# Patient Record
Sex: FEMALE | Race: BLACK OR AFRICAN AMERICAN | Employment: FULL TIME | URBAN - METROPOLITAN AREA
[De-identification: names, ages, dates, MRNs, and addresses within clinical notes are randomized per-mention and may not be internally consistent; named-entity substitution may affect disease eponyms.]

---

## 2017-02-07 RX ORDER — PIOGLITAZONEHYDROCHLORIDE 30 MG/1
TABLET ORAL
Qty: 90 TAB | Refills: 0 | Status: SHIPPED | OUTPATIENT
Start: 2017-02-07 | End: 2017-09-16 | Stop reason: SDUPTHER

## 2017-03-07 ENCOUNTER — TELEPHONE (OUTPATIENT)
Dept: FAMILY MEDICINE CLINIC | Age: 57
End: 2017-03-07

## 2017-03-07 RX ORDER — IRBESARTAN 75 MG/1
TABLET ORAL
Qty: 90 TAB | Refills: 0 | Status: SHIPPED | OUTPATIENT
Start: 2017-03-07 | End: 2017-09-20 | Stop reason: SDUPTHER

## 2017-03-07 RX ORDER — LIRAGLUTIDE 6 MG/ML
INJECTION SUBCUTANEOUS
Qty: 18 SYRINGE | Refills: 4 | Status: SHIPPED | OUTPATIENT
Start: 2017-03-07 | End: 2017-03-22 | Stop reason: SDUPTHER

## 2017-03-07 NOTE — TELEPHONE ENCOUNTER
[phone] notified patient labs been ordered she can come in prior to her appointment 3/22/2017 anytime for rlabs only

## 2017-03-07 NOTE — TELEPHONE ENCOUNTER
Contact # is [phone]    Patient had to reschedule 8am appointment on March 22nd due to provider and is requesting that the fasting blood work that Dr Marium Ervin would like done be placed in so that she can come in that Monday, March 20th, to do the blood work.

## 2017-03-15 ENCOUNTER — LAB ONLY (OUTPATIENT)
Dept: FAMILY MEDICINE CLINIC | Age: 57
End: 2017-03-15

## 2017-03-15 DIAGNOSIS — E78.5 HYPERLIPIDEMIA WITH TARGET LDL LESS THAN 100: ICD-10-CM

## 2017-03-15 DIAGNOSIS — I10 ESSENTIAL HYPERTENSION WITH GOAL BLOOD PRESSURE LESS THAN 140/90: ICD-10-CM

## 2017-03-15 DIAGNOSIS — E11.9 DIABETES MELLITUS TYPE 2, CONTROLLED (HCC): ICD-10-CM

## 2017-03-16 LAB
ALBUMIN SERPL-MCNC: 4.2 G/DL (ref 3.5–5.5)
ALBUMIN/CREAT UR: 3.6 MG/G CREAT (ref 0–30)
ALBUMIN/GLOB SERPL: 1.6 {RATIO} (ref 1.2–2.2)
ALP SERPL-CCNC: 78 IU/L (ref 39–117)
ALT SERPL-CCNC: 11 IU/L (ref 0–32)
AST SERPL-CCNC: 13 IU/L (ref 0–40)
BILIRUB SERPL-MCNC: 0.4 MG/DL (ref 0–1.2)
BUN SERPL-MCNC: 18 MG/DL (ref 6–24)
BUN/CREAT SERPL: 24 (ref 9–23)
CALCIUM SERPL-MCNC: 9.2 MG/DL (ref 8.7–10.2)
CHLORIDE SERPL-SCNC: 106 MMOL/L (ref 96–106)
CHOLEST SERPL-MCNC: 142 MG/DL (ref 100–199)
CO2 SERPL-SCNC: 25 MMOL/L (ref 18–29)
CREAT SERPL-MCNC: 0.75 MG/DL (ref 0.57–1)
CREAT UR-MCNC: 120.7 MG/DL
GLOBULIN SER CALC-MCNC: 2.6 G/DL (ref 1.5–4.5)
GLUCOSE SERPL-MCNC: 106 MG/DL (ref 65–99)
HDLC SERPL-MCNC: 70 MG/DL
INTERPRETATION, 910389: NORMAL
LDLC SERPL CALC-MCNC: 65 MG/DL (ref 0–99)
MICROALBUMIN UR-MCNC: 4.3 UG/ML
POTASSIUM SERPL-SCNC: 4.6 MMOL/L (ref 3.5–5.2)
PROT SERPL-MCNC: 6.8 G/DL (ref 6–8.5)
SODIUM SERPL-SCNC: 145 MMOL/L (ref 134–144)
TRIGL SERPL-MCNC: 34 MG/DL (ref 0–149)
VLDLC SERPL CALC-MCNC: 7 MG/DL (ref 5–40)

## 2017-03-22 ENCOUNTER — OFFICE VISIT (OUTPATIENT)
Dept: FAMILY MEDICINE CLINIC | Age: 57
End: 2017-03-22

## 2017-03-22 VITALS
RESPIRATION RATE: 15 BRPM | OXYGEN SATURATION: 99 % | DIASTOLIC BLOOD PRESSURE: 83 MMHG | HEIGHT: 63 IN | BODY MASS INDEX: 37.46 KG/M2 | WEIGHT: 211.4 LBS | HEART RATE: 77 BPM | SYSTOLIC BLOOD PRESSURE: 138 MMHG | TEMPERATURE: 98.4 F

## 2017-03-22 DIAGNOSIS — E78.5 HYPERLIPIDEMIA WITH TARGET LDL LESS THAN 100: ICD-10-CM

## 2017-03-22 DIAGNOSIS — I10 ESSENTIAL HYPERTENSION: ICD-10-CM

## 2017-03-22 DIAGNOSIS — Z23 ENCOUNTER FOR IMMUNIZATION: ICD-10-CM

## 2017-03-22 DIAGNOSIS — E11.9 CONTROLLED TYPE 2 DIABETES MELLITUS WITHOUT COMPLICATION, WITHOUT LONG-TERM CURRENT USE OF INSULIN (HCC): Primary | ICD-10-CM

## 2017-03-22 LAB — HBA1C MFR BLD HPLC: 6.1 %

## 2017-03-22 NOTE — PROGRESS NOTES
HISTORY OF PRESENT ILLNESS  Domenico Nelson is a 64 y.o. female. Blood pressure 138/83, pulse 77, temperature 98.4 °F (36.9 °C), temperature source Oral, resp. rate 15, height 5' 3\" (1.6 m), weight 211 lb 6.4 oz (95.9 kg), last menstrual period 06/30/2013, SpO2 99 %. Body mass index is 37.45 kg/(m^2). Chief Complaint   Patient presents with    Diabetes    Hypertension      HPI   Domenico Nelson 64 y.o. female  presents to the office today for a follow up on chronic conditions. Hypertension: Bp at office today 138/83. Pt continues with Irbesartan 75 mg nightly. Bp control stable, continue current regimen. DM2: A1c per POC today 6.1%, increased from A1c 5.7% per POC on 09/21/16. Pt continues with Victoza 1.2 mg daily, Actos 30 mg daily, and Metformin  mg daily. Pt notes she discontinued Farxiga 5 mg daily one week ago due to associated muscle aches in her low back. She states she had tried to go back to taking [place], but states the low back pain returned so she discontinued it. Diabetes is controlled with A1c 6.1% per POC today. Since pt has discontinued her Farxiga 5 mg due to associated low back pain, I have advised pt to increase dosage of Victoza to 1.8 mg daily and to decrease dosage of Actos to 15 mg daily. Hyperlipidemia: Lipid panel on 03/15/17 notable for total cholesterol 142, LDL 65, HDL 70, and triglycerides 34. Pt continues with Lipitor 20 mg daily. Cholesterol is stable, continue current regimen. Health maintenance: Pt will receive Pneumovax-23 at office today. Pt notes last pap smear was in June 2016 with Dr. Goldie Restrepo (gynecology). Current Outpatient Prescriptions   Medication Sig Dispense Refill    Liraglutide (VICTOZA 2-MAYLIN) 0.6 mg/0.1 mL (18 mg/3 mL) sub-q pen 0.3 mL by SubCUTAneous route daily. 3 Syringe 5    irbesartan (AVAPRO) 75 mg tablet TAKE 1 TABLET BY MOUTH NIGHTLY. 90 Tab 0    pioglitazone (ACTOS) 30 mg tablet TAKE 1 TABLET BY MOUTH DAILY.  90 Tab 0    ONETOUCH ULTRA TEST strip USE DAILY AS DIRECTED 100 Strip 11    metFORMIN ER (GLUCOPHAGE XR) 750 mg tablet TAKE 1 TAB BY MOUTH DAILY. 30 Tab 5    NOVOFINE 32 32 gauge x 1/4\" ndle USE AS DIRECTED 50 Pen Needle 5    atorvastatin (LIPITOR) 20 mg tablet TAKE ONE TABLET BY MOUTH DAILY 30 Tab 5    diclofenac (VOLTAREN) 1 % gel Apply 4 g to affected area four (4) times daily. 100 g 0     Allergies   Allergen Reactions    Pcn [Penicillins] Rash     Past Medical History:   Diagnosis Date    Diabetes ([facility]     Hypercholesterolemia     Hypertension      No past surgical history on file. Family History   Problem Relation Age of Onset    Diabetes Mother     Diabetes Father     Diabetes Maternal Aunt     Diabetes Maternal Grandmother     Diabetes Maternal Grandfather     Diabetes Paternal Grandmother     Diabetes Paternal Grandfather      Social History   Substance Use Topics    Smoking status: Never Smoker    Smokeless tobacco: Never Used    Alcohol use No        Review of Systems   Constitutional: Negative. Negative for malaise/fatigue. Eyes: Negative for blurred vision. Respiratory: Negative for shortness of breath. Cardiovascular: Negative for chest pain and leg swelling. Musculoskeletal: Negative. Neurological: Negative. Negative for dizziness and headaches. All other systems reviewed and are negative. Physical Exam   Constitutional: She is oriented to person, place, and time. She appears well-developed and well-nourished. No distress. HENT:   Head: Normocephalic and atraumatic. Neck: Carotid bruit is not present. Cardiovascular: Normal rate, regular rhythm, normal heart sounds and intact distal pulses. Exam reveals no gallop and no friction rub. No murmur heard. Pulmonary/Chest: Effort normal and breath sounds normal. No respiratory distress. She has no wheezes. She has no rales. Musculoskeletal: She exhibits no edema.    Neurological: She is alert and oriented to person, place, and time. Skin: She is not diaphoretic. Psychiatric: She has a normal mood and affect. Her behavior is normal. Judgment and thought content normal.   Nursing note and vitals reviewed. Diabetic foot exam:     Left: Reflexes 2+     Vibratory sensation normal    Proprioception normal   Sharp/dull discrimination normal    Filament test normal sensation with micro filament   Pulse DP: 2+ (normal)   Pulse PT: 2+ (normal)   Deformities: None  Right: Reflexes 2+   Vibratory sensation normal   Proprioception normal   Sharp/dull discrimination normal   Filament test normal sensation with micro filament   Pulse DP: 2+ (normal)   Pulse PT: 2+ (normal)   Deformities: None    ASSESSMENT and PLAN  Haider Dillard was seen today for diabetes and hypertension. Diagnoses and all orders for this visit:    Controlled type 2 diabetes mellitus without complication, without long-term current use of insulin ([facility])  -     [facility] POC HEMOGLOBIN A1C  -      DIABETES FOOT EXAM  -     Liraglutide (VICTOZA 2-MAYLIN) 0.6 mg/0.1 mL (18 mg/3 mL) sub-q pen; 0.3 mL by SubCUTAneous route daily.  - Diabetes is controlled with A1c 6.1% per POC today. Since pt has discontinued her Farxiga 5 mg due to associated low back pain, I have advised pt to increase dosage of Victoza from 1.2 mg to 1.8 mg daily and decrease dosage of Actos to 15 mg daily. Monitor blood glucose levels outside the office. Hyperlipidemia with target LDL less than 100  Stable, continue current regimen    Essential hypertension  Bp control stable, continue current regimen. Obesity, Class II, BMI 35-39.9, with comorbidity ([address]  I have reviewed/discussed the above normal BMI with the patient. I have recommended the following interventions: dietary management education, guidance, and counseling, dietary needs education, encourage exercise, feeding regime, lifestyle education regarding diet and monitor weight .  The plan is as follows: I have counseled this patient on diet and exercise regimens. .      Encounter for immunization  -     PNEUMOCOCCAL POLYSACCHARIDE VACCINE, 23-VALENT, ADULT OR IMMUNOSUPPRESSED PT DOSE,      Follow-up Disposition:  Return in about 6 months (around 9/22/2017) for diabetes follow up. Medication risks/benefits/costs/interactions/alternatives discussed with patient. Advised patient to call back or return to office if symptoms worsen/change/persist.  If patient cannot reach us or should anything more severe/urgent arise he/she should proceed directly to the nearest emergency department. Discussed expected course/resolution/complications of diagnosis in detail with patient. Patient given a written after visit summary which includes her diagnoses, current medications and vitals. Patient expressed understanding with the diagnosis and plan. Written by valencia Cordero, as dictated by Shiraz Pappas M.D.    I have reviewed and agree with the above note and have made corrections where appropriate, Dr. Cory Blanchard MD

## 2017-03-22 NOTE — PROGRESS NOTES
Chief Complaint   Patient presents with    Diabetes    Hypertension       1. Have you been to the ER, urgent care clinic since your last visit? Hospitalized since your last visit? No    2. Have you seen or consulted any other health care providers outside of the [address] [place] [address] since your last visit? Include any pap smears or colon screening. No    Body mass index is 37.45 kg/(m^2).

## 2017-03-22 NOTE — MR AVS SNAPSHOT
Visit Information Date & Time Provider Department Dept. Phone Encounter #  
 3/22/2017 10:30 AM Rosita Lara  [facility] [address] [phone] [id number] Follow-up Instructions Return in about 6 months (around 9/22/2017) for diabetes follow up. Upcoming Health Maintenance Date Due Pneumococcal 19-64 Medium Risk (1 of 1 - PPSV23) 6/8/1979 PAP AKA CERVICAL CYTOLOGY 5/1/2016 FOOT EXAM Q1 11/11/2016 HEMOGLOBIN A1C Q6M 3/21/2017 EYE EXAM RETINAL OR DILATED Q1 6/27/2017 MICROALBUMIN Q1 3/15/2018 LIPID PANEL Q1 3/15/2018 BREAST CANCER SCRN MAMMOGRAM 11/16/2018 COLONOSCOPY 5/1/2020 DTaP/Tdap/Td series (2 - Td) 12/2/2021 Allergies as of 3/22/2017  Review Complete On: 3/22/2017 By: Sheldon Glass LPN Severity Noted Reaction Type Reactions Pcn [Penicillins]  05/03/2010    Rash Current Immunizations  Reviewed on 3/4/2015 Name Date Influenza Vaccine 10/22/2015 Influenza Vaccine (Quad) PF 9/21/2016  5:19 PM  
 Influenza Vaccine PF 1/19/2015, 10/28/2013 Meningococcal Vaccine 12/2/2011 Pneumococcal Polysaccharide (PPSV-23)  Incomplete TDAP Vaccine 12/2/2011 Not reviewed this visit You Were Diagnosed With   
  
 Codes Comments Controlled type 2 diabetes mellitus without complication, without long-term current use of insulin ([facility]    -  Primary ICD-10-CM: E11.9 ICD-9-CM: 250.00 Hyperlipidemia with target LDL less than 100     ICD-10-CM: E78.5 ICD-9-CM: 272.4 Essential hypertension     ICD-10-CM: I10 
ICD-9-CM: 401.9 Obesity, Class II, BMI 35-39.9, with comorbidity (HCC)     ICD-10-CM: E66.01 
ICD-9-CM: 278.01 Encounter for immunization     ICD-10-CM: O82 ICD-9-CM: V03.89 Vitals BP Pulse Temp Resp Height(growth percentile) Weight(growth percentile)  138/83 (BP 1 Location: Left arm, BP Patient Position: Sitting) 77 98.4 °F (36.9 °C) (Oral) 15 5' 3\" (1.6 m) 211 lb 6.4 oz (95.9 kg) LMP SpO2 BMI OB Status Smoking Status 2013 99% 37.45 kg/m2 Postmenopausal Never Smoker Vitals History BMI and BSA Data Body Mass Index Body Surface Area  
 37.45 kg/m 2 2.06 m 2 Preferred Pharmacy Pharmacy Name Phone [id number] Myriam Baxter, [address] [place] [address] [phone] Your Updated Medication List  
  
   
This list is accurate as of: 3/22/17 11:33 AM.  Always use your most recent med list.  
  
  
  
  
 atorvastatin 20 mg tablet Commonly known as:  LIPITOR  
TAKE ONE TABLET BY MOUTH DAILY  
  
 diclofenac 1 % Gel Commonly known as:  VOLTAREN Apply 4 g to affected area four (4) times daily. irbesartan 75 mg tablet Commonly known as:  AVAPRO TAKE 1 TABLET BY MOUTH NIGHTLY. Liraglutide 0.6 mg/0.1 mL (18 mg/3 mL) sub-q pen Commonly known as:  VICTOZA 2-MAYLIN  
0.3 mL by SubCUTAneous route daily. metFORMIN  mg tablet Commonly known as:  GLUCOPHAGE XR  
TAKE 1 TAB BY MOUTH DAILY. NOVOFINE 32 32 gauge x 1/4\" Ndle Generic drug:  Insulin Needles (Disposable) USE AS DIRECTED  
  
 ONETOUCH ULTRA TEST strip Generic drug:  glucose blood VI test strips USE DAILY AS DIRECTED  
  
 pioglitazone 30 mg tablet Commonly known as:  ACTOS  
TAKE 1 TABLET BY MOUTH DAILY. Prescriptions Sent to Pharmacy Refills Liraglutide (VICTOZA 2-MAYLIN) 0.6 mg/0.1 mL (18 mg/3 mL) sub-q pen 5 Si.3 mL by SubCUTAneous route daily. Class: Normal  
 Pharmacy: [facility]/pharmacy [address], [address] [place] [address] Ph #: [phone] Route: SubCUTAneous We Performed the Following AMB POC HEMOGLOBIN A1C [06262 CPT(R)] HM DIABETES FOOT EXAM [7 Custom] PNEUMOCOCCAL POLYSACCHARIDE VACCINE, 23-VALENT, ADULT OR IMMUNOSUPPRESSED PT DOSE, [03332 CPT(R)] Follow-up Instructions Return in about 6 months (around 9/22/2017) for diabetes follow up. To-Do List   
 08/01/2017 Lab:  HEMOGLOBIN A1C WITH EAG   
  
 08/01/2017 Lab:  LIPID PANEL   
  
 08/01/2017 Lab:  METABOLIC PANEL, COMPREHENSIVE Introducing [facility]! Julia Orellana introduces [employer] patient portal. Now you can access parts of your medical record, email your doctor's office, and request medication refills online. 1. In your internet browser, go to [email or web address] 2. Click on the First Time User? Click Here link in the Sign In box. You will see the New Member Sign Up page. 3. Enter your [employer] Access Code exactly as it appears below. You will not need to use this code after youve completed the sign-up process. If you do not sign up before the expiration date, you must request a new code. · [employer] Access Code: [email or web address] Expires: 6/20/2017 11:23 AM 
 
4. Enter the last four digits of your Social Security Number (xxxx) and Date of Birth (mm/dd/yyyy) as indicated and click Submit. You will be taken to the next sign-up page. 5. Create a [employer] ID. This will be your [employer] login ID and cannot be changed, so think of one that is secure and easy to remember. 6. Create a [employer] password. You can change your password at any time. 7. Enter your Password Reset Question and Answer. This can be used at a later time if you forget your password. 8. Enter your e-mail address. You will receive e-mail notification when new information is available in [address] 9. Click Sign Up. You can now view and download portions of your medical record. 10. Click the Download Summary menu link to download a portable copy of your medical information. If you have questions, please visit the Frequently Asked Questions section of the [employer] website. Remember, [employer] is NOT to be used for urgent needs. For medical emergencies, dial 911. Now available from your iPhone and Android! Please provide this summary of care documentation to your next provider. Your primary care clinician is listed as Anice Reagan. If you have any questions after today's visit, please call [phone].

## 2017-04-17 RX ORDER — IRBESARTAN 75 MG/1
75 TABLET ORAL
Qty: 90 TAB | Refills: 0 | Status: SHIPPED | OUTPATIENT
Start: 2017-04-17 | End: 2017-06-28 | Stop reason: SDUPTHER

## 2017-04-26 DIAGNOSIS — E78.5 HYPERLIPIDEMIA WITH TARGET LDL LESS THAN 100: ICD-10-CM

## 2017-04-27 RX ORDER — ATORVASTATIN CALCIUM 20 MG/1
TABLET, FILM COATED ORAL
Qty: 30 TAB | Refills: 5 | Status: SHIPPED | OUTPATIENT
Start: 2017-04-27 | End: 2017-09-20 | Stop reason: SDUPTHER

## 2017-06-15 RX ORDER — METFORMIN HYDROCHLORIDE 750 MG/1
TABLET, EXTENDED RELEASE ORAL
Qty: 30 TAB | Refills: 5 | Status: SHIPPED | OUTPATIENT
Start: 2017-06-15 | End: 2017-09-20 | Stop reason: SDUPTHER

## 2017-09-16 DIAGNOSIS — E11.9 DIABETES MELLITUS TYPE 2, CONTROLLED (HCC): ICD-10-CM

## 2017-09-18 RX ORDER — PIOGLITAZONEHYDROCHLORIDE 30 MG/1
TABLET ORAL
Qty: 90 TAB | Refills: 0 | Status: SHIPPED | OUTPATIENT
Start: 2017-09-18 | End: 2017-09-20 | Stop reason: SDUPTHER

## 2017-09-18 RX ORDER — PEN NEEDLE, DIABETIC 32 GX 1/4"
NEEDLE, DISPOSABLE MISCELLANEOUS
Qty: 100 PEN NEEDLE | Refills: 5 | Status: SHIPPED | OUTPATIENT
Start: 2017-09-18 | End: 2017-09-20 | Stop reason: SDUPTHER

## 2017-09-20 ENCOUNTER — OFFICE VISIT (OUTPATIENT)
Dept: FAMILY MEDICINE CLINIC | Age: 57
End: 2017-09-20

## 2017-09-20 VITALS
TEMPERATURE: 98.2 F | OXYGEN SATURATION: 100 % | RESPIRATION RATE: 12 BRPM | DIASTOLIC BLOOD PRESSURE: 70 MMHG | HEART RATE: 93 BPM | HEIGHT: 63 IN | WEIGHT: 214.6 LBS | SYSTOLIC BLOOD PRESSURE: 124 MMHG | BODY MASS INDEX: 38.02 KG/M2

## 2017-09-20 DIAGNOSIS — Z23 ENCOUNTER FOR IMMUNIZATION: ICD-10-CM

## 2017-09-20 DIAGNOSIS — E11.9 CONTROLLED TYPE 2 DIABETES MELLITUS WITHOUT COMPLICATION, WITHOUT LONG-TERM CURRENT USE OF INSULIN (HCC): Primary | ICD-10-CM

## 2017-09-20 DIAGNOSIS — E78.5 HYPERLIPIDEMIA WITH TARGET LDL LESS THAN 100: ICD-10-CM

## 2017-09-20 DIAGNOSIS — E55.9 VITAMIN D DEFICIENCY: ICD-10-CM

## 2017-09-20 DIAGNOSIS — G47.9 SLEEP DISTURBANCE: ICD-10-CM

## 2017-09-20 DIAGNOSIS — I10 ESSENTIAL HYPERTENSION: ICD-10-CM

## 2017-09-20 RX ORDER — BLOOD SUGAR DIAGNOSTIC
STRIP MISCELLANEOUS
Qty: 100 PEN NEEDLE | Refills: 5 | Status: SHIPPED | OUTPATIENT
Start: 2017-09-20 | End: 2018-10-16 | Stop reason: RX

## 2017-09-20 RX ORDER — METFORMIN HYDROCHLORIDE 750 MG/1
TABLET, EXTENDED RELEASE ORAL
Qty: 90 TAB | Refills: 1 | Status: SHIPPED | OUTPATIENT
Start: 2017-09-20 | End: 2018-04-11 | Stop reason: SDUPTHER

## 2017-09-20 RX ORDER — IRBESARTAN 75 MG/1
TABLET ORAL
Qty: 90 TAB | Refills: 1 | Status: SHIPPED | OUTPATIENT
Start: 2017-09-20 | End: 2018-03-13 | Stop reason: SDUPTHER

## 2017-09-20 RX ORDER — ATORVASTATIN CALCIUM 20 MG/1
TABLET, FILM COATED ORAL
Qty: 90 TAB | Refills: 1 | Status: SHIPPED | OUTPATIENT
Start: 2017-09-20 | End: 2018-05-14 | Stop reason: SDUPTHER

## 2017-09-20 RX ORDER — PIOGLITAZONEHYDROCHLORIDE 30 MG/1
TABLET ORAL
Qty: 90 TAB | Refills: 1 | Status: SHIPPED | OUTPATIENT
Start: 2017-09-20 | End: 2018-01-11 | Stop reason: SDUPTHER

## 2017-09-20 NOTE — LETTER
9/20/2017 10:17 AM 
 
Ms. Ángel Mejia [address] [facility] To whom it may concern: 
 
 
Patient Booker Arthur 1960 was seen for PAP examination can we have his/her results fax to our office so we can update his/her chart [phone]. If you have any question please don't hesitate to call us back. Sincerely, Mini Robledo MD

## 2017-09-20 NOTE — PROGRESS NOTES
1. Have you been to the ER, urgent care clinic since your last visit? Hospitalized since your last visit? No    2. Have you seen or consulted any other health care providers outside of the [address] [place] [address] since your last visit? Include any pap smears or colon screening.  No     Chief Complaint   Patient presents with    Hypertension     6 month follow up    Diabetes    Cholesterol Problem

## 2017-09-20 NOTE — MR AVS SNAPSHOT
Visit Information Date & Time Provider Department Dept. Phone Encounter #  
 9/20/2017  9:30 AM Jaun Blanco MD [address] [place] [address] [phone] [id number] Follow-up Instructions Return in about 6 months (around 3/20/2018) for diabetes follow up. Upcoming Health Maintenance Date Due  
 PAP AKA CERVICAL CYTOLOGY 5/1/2016 EYE EXAM RETINAL OR DILATED Q1 6/27/2017 INFLUENZA AGE 9 TO ADULT 8/1/2017 HEMOGLOBIN A1C Q6M 9/22/2017 MICROALBUMIN Q1 3/15/2018 LIPID PANEL Q1 3/15/2018 FOOT EXAM Q1 3/22/2018 BREAST CANCER SCRN MAMMOGRAM 11/16/2018 COLONOSCOPY 5/1/2020 DTaP/Tdap/Td series (2 - Td) 12/2/2021 Allergies as of 9/20/2017  Review Complete On: 9/20/2017 By: Jaun Blanco MD  
  
 Severity Noted Reaction Type Reactions Pcn [Penicillins]  05/03/2010    Rash Current Immunizations  Reviewed on 9/20/2017 Name Date Hep A Vaccine (Adult) 8/18/2017 12:00 AM  
 Hep B Vaccine 8/17/2017 Influenza Vaccine 10/22/2015 Influenza Vaccine (Quad) PF 9/20/2017, 9/21/2016  5:19 PM  
 Influenza Vaccine PF 1/19/2015, 10/28/2013 Meningococcal Vaccine 12/2/2011 Pneumococcal Polysaccharide (PPSV-23) 3/22/2017 TDAP Vaccine 12/2/2011 Typhoid Vaccine 8/11/2017 Reviewed by Jaun Blanco MD on 9/20/2017 at 10:19 AM  
You Were Diagnosed With   
  
 Codes Comments Controlled type 2 diabetes mellitus without complication, without long-term current use of insulin ([facility]    -  Primary ICD-10-CM: E11.9 ICD-9-CM: 250.00 Hyperlipidemia with target LDL less than 100     ICD-10-CM: E78.5 ICD-9-CM: 272.4 Essential hypertension     ICD-10-CM: I10 
ICD-9-CM: 401.9 Vitamin D deficiency     ICD-10-CM: E55.9 ICD-9-CM: 268.9 Obesity, Class II, BMI 35-39.9, with comorbidity     ICD-10-CM: E66.9 ICD-9-CM: 278.00 Sleep disturbance     ICD-10-CM: G47.9 ICD-9-CM: 780.50  Encounter for immunization     ICD-10-CM: X64 
 ICD-9-CM: V03.89 Vitals BP Pulse Temp Resp Height(growth percentile) Weight(growth percentile) 124/70 93 98.2 °F (36.8 °C) (Oral) 12 5' 3\" (1.6 m) 214 lb 9.6 oz (97.3 kg) LMP SpO2 BMI OB Status Smoking Status 06/30/2013 100% 38.01 kg/m2 Postmenopausal Never Smoker Vitals History BMI and BSA Data Body Mass Index Body Surface Area 38.01 kg/m 2 2.08 m 2 Preferred Pharmacy Pharmacy Name Phone [facility]/PHARMACY #[id number] Marlene Guerin, [address] [facility] [phone] Your Updated Medication List  
  
   
This list is accurate as of: 9/20/17 10:25 AM.  Always use your most recent med list.  
  
  
  
  
 atorvastatin 20 mg tablet Commonly known as:  LIPITOR  
TAKE ONE TABLET BY MOUTH DAILY  
  
 diclofenac 1 % Gel Commonly known as:  VOLTAREN Apply 4 g to affected area four (4) times daily. Insulin Needles (Disposable) 32 gauge x 1/4\" Ndle Commonly known as:  NOVOFINE 32 USE AS DIRECTED ONCE EVERY DAY  
  
 irbesartan 75 mg tablet Commonly known as:  AVAPRO TAKE 1 TAB BY MOUTH NIGHTLY. Liraglutide 0.6 mg/0.1 mL (18 mg/3 mL) Pnij Commonly known as:  VICTOZA 2-MAYLIN  
1.8 mg by SubCUTAneous route daily. metFORMIN  mg tablet Commonly known as:  GLUCOPHAGE XR  
TAKE 1 TAB BY MOUTH DAILY. ONETOUCH ULTRA TEST strip Generic drug:  glucose blood VI test strips USE DAILY AS DIRECTED  
  
 pioglitazone 30 mg tablet Commonly known as:  ACTOS  
TAKE 1 TABLET BY MOUTH DAILY. Prescriptions Sent to Pharmacy Refills  
 atorvastatin (LIPITOR) 20 mg tablet 1 Sig: TAKE ONE TABLET BY MOUTH DAILY Class: Normal  
 Pharmacy: [facility]/pharmacy #[id number] - [address] Ph #: [phone]  
 irbesartan (AVAPRO) 75 mg tablet 1 Sig: TAKE 1 TAB BY MOUTH NIGHTLY.   
 Class: Normal  
 [facility] [address] [place] [address] Ph #: [phone] Liraglutide (VICTOZA 2-MAYLIN) 0.6 mg/0.1 mL (18 mg/3 mL) pnij 5 Si.8 mg by SubCUTAneous route daily. Class: Normal  
 [facility] [address] [place] [address] Ph #: [phone] Route: SubCUTAneous  
 metFORMIN ER (GLUCOPHAGE XR) 750 mg tablet 1 Sig: TAKE 1 TAB BY MOUTH DAILY. Class: Normal  
 [facility] [address] [place] [address] Ph #: [phone] Insulin Needles, Disposable, (NOVOFINE 32) 32 gauge x 1/4\" ndle 5 Sig: USE AS DIRECTED ONCE EVERY DAY Class: Normal  
 [id number] - [address] Ph #: [phone]  
 pioglitazone (ACTOS) 30 mg tablet 1 Sig: TAKE 1 TABLET BY MOUTH DAILY. Class: Normal  
 [facility] [address] [place] [address] Ph #: [phone] We Performed the Following AMB POC HEMOGLOBIN A1C [71876 CPT(R)] HM DIABETES FOOT EXAM [HM7 Custom] INFLUENZA VIRUS VAC QUAD,SPLIT,PRESV FREE SYRINGE IM [id number] CPT(R)] LIPID PANEL [56836 CPT(R)] METABOLIC PANEL, COMPREHENSIVE [69654 CPT(R)] MICROALBUMIN, UR, RAND W/ MICROALBUMIN/CREA RATIO [id number] CPT(R)] SLEEP MEDICINE REFERRAL [UWH786 Custom] Comments:  
 Please evaluate patient for sleep disturbance. VITAMIN D, 25 HYDROXY [id number] CPT(R)] Follow-up Instructions Return in about 6 months (around 3/20/2018) for diabetes follow up. Referral Information Referral ID Referred By Referred To  
  
 [id number] Conrad Nevarez MD   
   [address] [place] [address] Phone: [phone] Fax: [phone] Visits Status Start Date End Date 1 New Request 17 If your referral has a status of pending review or denied, additional information will be sent to support the outcome of this decision. Patient Instructions Body Mass Index: Care Instructions Your Care Instructions Body mass index (BMI) can help you see if your weight is raising your risk for health problems. It uses a formula to compare how much you weigh with how tall you are. · A BMI lower than 18.5 is considered underweight. · A BMI between 18.5 and 24.9 is considered healthy. · A BMI between 25 and 29.9 is considered overweight. A BMI of 30 or higher is considered obese. If your BMI is in the normal range, it means that you have a lower risk for weight-related health problems. If your BMI is in the overweight or obese range, you may be at increased risk for weight-related health problems, such as high blood pressure, heart disease, stroke, arthritis or joint pain, and diabetes. If your BMI is in the underweight range, you may be at increased risk for health problems such as fatigue, lower protection (immunity) against illness, muscle loss, bone loss, hair loss, and hormone problems. BMI is just one measure of your risk for weight-related health problems. You may be at higher risk for health problems if you are not active, you eat an unhealthy diet, or you drink too much alcohol or use tobacco products. Follow-up care is a key part of your treatment and safety. Be sure to make and go to all appointments, and call your doctor if you are having problems. It's also a good idea to know your test results and keep a list of the medicines you take. How can you care for yourself at home? · Practice healthy eating habits. This includes eating plenty of fruits, vegetables, whole grains, lean protein, and low-fat dairy. · If your doctor recommends it, get more exercise. Walking is a good choice. Bit by bit, increase the amount you walk every day.  Try for at least 30 minutes on most days of the week. · Do not smoke. Smoking can increase your risk for health problems. If you need help quitting, talk to your doctor about stop-smoking programs and medicines. These can increase your chances of quitting for good. · Limit alcohol to 2 drinks a day for men and 1 drink a day for women. Too much alcohol can cause health problems. If you have a BMI higher than 25 · Your doctor may do other tests to check your risk for weight-related health problems. This may include measuring the distance around your waist. A waist measurement of more than 40 inches in men or 35 inches in women can increase the risk of weight-related health problems. · Talk with your doctor about steps you can take to stay healthy or improve your health. You may need to make lifestyle changes to lose weight and stay healthy, such as changing your diet and getting regular exercise. If you have a BMI lower than 18.5 · Your doctor may do other tests to check your risk for health problems. · Talk with your doctor about steps you can take to stay healthy or improve your health. You may need to make lifestyle changes to gain or maintain weight and stay healthy, such as getting more healthy foods in your diet and doing exercises to build muscle. Where can you learn more? Go to http://nahomy-najma.info/. Enter S176 in the search box to learn more about \"Body Mass Index: Care Instructions. \" Current as of: January 23, 2017 Content Version: 11.3 © 3976-6366 [employer]. Care instructions adapted under license by [employer] (which disclaims liability or warranty for this information). If you have questions about a medical condition or this instruction, always ask your healthcare professional. [place] [address] any warranty or liability for your use of this information. Introducing [facility]! [facility] introduces ThinkGrid patient portal. Now you can access parts of your medical record, email your doctor's office, and request medication refills online. 1. In your internet browser, go to [email or web address] 2. Click on the First Time User? Click Here link in the Sign In box. You will see the New Member Sign Up page. 3. Enter your ThinkGrid Access Code exactly as it appears below. You will not need to use this code after youve completed the sign-up process. If you do not sign up before the expiration date, you must request a new code. · ThinkGrid Access Code: [email or web address] Expires: 12/19/2017  9:43 AM 
 
4. Enter the last four digits of your Social Security Number (xxxx) and Date of Birth (mm/dd/yyyy) as indicated and click Submit. You will be taken to the next sign-up page. 5. Create a ThinkGrid ID. This will be your ThinkGrid login ID and cannot be changed, so think of one that is secure and easy to remember. 6. Create a ThinkGrid password. You can change your password at any time. 7. Enter your Password Reset Question and Answer. This can be used at a later time if you forget your password. 8. Enter your e-mail address. You will receive e-mail notification when new information is available in [address] 9. Click Sign Up. You can now view and download portions of your medical record. 10. Click the Download Summary menu link to download a portable copy of your medical information. If you have questions, please visit the Frequently Asked Questions section of the ThinkGrid website. Remember, ThinkGrid is NOT to be used for urgent needs. For medical emergencies, dial 911. Now available from your iPhone and Android! Please provide this summary of care documentation to your next provider. Your primary care clinician is listed as Bry Yaneli. If you have any questions after today's visit, please call [phone].

## 2017-09-20 NOTE — PATIENT INSTRUCTIONS
Body Mass Index: Care Instructions  Your Care Instructions    Body mass index (BMI) can help you see if your weight is raising your risk for health problems. It uses a formula to compare how much you weigh with how tall you are. · A BMI lower than 18.5 is considered underweight. · A BMI between 18.5 and 24.9 is considered healthy. · A BMI between 25 and 29.9 is considered overweight. A BMI of 30 or higher is considered obese. If your BMI is in the normal range, it means that you have a lower risk for weight-related health problems. If your BMI is in the overweight or obese range, you may be at increased risk for weight-related health problems, such as high blood pressure, heart disease, stroke, arthritis or joint pain, and diabetes. If your BMI is in the underweight range, you may be at increased risk for health problems such as fatigue, lower protection (immunity) against illness, muscle loss, bone loss, hair loss, and hormone problems. BMI is just one measure of your risk for weight-related health problems. You may be at higher risk for health problems if you are not active, you eat an unhealthy diet, or you drink too much alcohol or use tobacco products. Follow-up care is a key part of your treatment and safety. Be sure to make and go to all appointments, and call your doctor if you are having problems. It's also a good idea to know your test results and keep a list of the medicines you take. How can you care for yourself at home? · Practice healthy eating habits. This includes eating plenty of fruits, vegetables, whole grains, lean protein, and low-fat dairy. · If your doctor recommends it, get more exercise. Walking is a good choice. Bit by bit, increase the amount you walk every day. Try for at least 30 minutes on most days of the week. · Do not smoke. Smoking can increase your risk for health problems. If you need help quitting, talk to your doctor about stop-smoking programs and medicines. These can increase your chances of quitting for good. · Limit alcohol to 2 drinks a day for men and 1 drink a day for women. Too much alcohol can cause health problems. If you have a BMI higher than 25  · Your doctor may do other tests to check your risk for weight-related health problems. This may include measuring the distance around your waist. A waist measurement of more than 40 inches in men or 35 inches in women can increase the risk of weight-related health problems. · Talk with your doctor about steps you can take to stay healthy or improve your health. You may need to make lifestyle changes to lose weight and stay healthy, such as changing your diet and getting regular exercise. If you have a BMI lower than 18.5  · Your doctor may do other tests to check your risk for health problems. · Talk with your doctor about steps you can take to stay healthy or improve your health. You may need to make lifestyle changes to gain or maintain weight and stay healthy, such as getting more healthy foods in your diet and doing exercises to build muscle. Where can you learn more? Go to http://nahomy-najma.info/. Enter S176 in the search box to learn more about \"Body Mass Index: Care Instructions. \"  Current as of: January 23, 2017  Content Version: 11.3  © 9933-6191 [employer]. Care instructions adapted under license by [employer] (which disclaims liability or warranty for this information). If you have questions about a medical condition or this instruction, always ask your healthcare professional. [place] [address] any warranty or liability for your use of this information.

## 2017-09-20 NOTE — PROGRESS NOTES
HISTORY OF PRESENT ILLNESS  Vertis Brunner is a 62 y.o. female. Blood pressure 124/70, pulse 93, temperature 98.2 °F (36.8 °C), temperature source Oral, resp. rate 12, height 5' 3\" (1.6 m), weight 214 lb 9.6 oz (97.3 kg), last menstrual period 06/30/2013, SpO2 100 %. Body mass index is 38.01 kg/(m^2). Chief Complaint   Patient presents with    Hypertension     6 month follow up    Diabetes    Cholesterol Problem        HPI  Vertis Brunner 62 y.o. female  presents to the office today for follow up on chronic conditions. Hypertension: Bp at office today 124/70 by manual arm cuff recheck. Pt continues with diet and exercise. Pt reports her bp is usually around 135. Hyperlipidemia: Lipid panel on 03/15/17 notable for total cholesterol 142, LDL 65, HDL 70, and triglycerides 34. Pt continues with Atorvastatin 20mg daily. Presumed stable, will assess levels today. DM2: A1c per POC today 6.2%, slightly elevated from A1c of 6.1% on 03/22/17. Pt continues with Victoza 1.8 mg daily, Actos 15 mg daily, and Metformin  mg daily. Pt reports that her blood sugars have been around 101-130 and the highest was 160. Sleep Issues: Pt states that she has been having some sleeping issues. Pt reports getting 4 hours of sleep every night and still feels tired when she wakes. Pt reports that she snores sometimes when she sleeps. Pt reports that she drinks 4 cups of coffee a day and is trying to cut back because she thinks that is what is causing her to not sleep. Pt reports that she has tried Melatonin 10mg every once in awhile. I advised pt to try taking 5mg daily of Melatonin. I advised pt to cutback on the amount of coffee she drinks and to possibly get a sleep study down to rule out sleep apnea. I advised pt to see Dr. Mar Delgado (Sleep disorder specialist). Vitamin D Deficiency: Pt's vitamin D levels were 16.5 10/28/14. Will recheck levels today.      Health Maintenance: Pt reports she just returned from her 10 day mission trip in [place]. Pt reports that she missed her eye doctor appointment and is making another one. Pt states that she got her influenza vaccine in office today and got a hep A+B vaccine and Typhoid done before she went to [facility]. Pt reports she got her gynecology exam and mammogram done by Dr. Prabhjot Otero (OBGYN) last year she thinks, but cannot remember. Current Outpatient Prescriptions   Medication Sig Dispense Refill    atorvastatin (LIPITOR) 20 mg tablet TAKE ONE TABLET BY MOUTH DAILY 90 Tab 1    irbesartan (AVAPRO) 75 mg tablet TAKE 1 TAB BY MOUTH NIGHTLY. 90 Tab 1    Liraglutide (VICTOZA 2-MAYLIN) 0.6 mg/0.1 mL (18 mg/3 mL) pnij 1.8 mg by SubCUTAneous route daily. 3 Adjustable Dose Pre-filled Pen Syringe 5    metFORMIN ER (GLUCOPHAGE XR) 750 mg tablet TAKE 1 TAB BY MOUTH DAILY. 90 Tab 1    Insulin Needles, Disposable, (NOVOFINE 32) 32 gauge x 1/4\" ndle USE AS DIRECTED ONCE EVERY  Pen Needle 5    pioglitazone (ACTOS) 30 mg tablet TAKE 1 TABLET BY MOUTH DAILY. 90 Tab 1    ONETOUCH ULTRA TEST strip USE DAILY AS DIRECTED 100 Strip 11    diclofenac (VOLTAREN) 1 % gel Apply 4 g to affected area four (4) times daily. 100 g 0     Allergies   Allergen Reactions    Pcn [Penicillins] Rash     Past Medical History:   Diagnosis Date    Diabetes ([address]     Hypercholesterolemia     Hypertension      History reviewed. No pertinent surgical history. Family History   Problem Relation Age of Onset    Diabetes Mother     Diabetes Father     Diabetes Maternal Aunt     Diabetes Maternal Grandmother     Diabetes Maternal Grandfather     Diabetes Paternal Grandmother     Diabetes Paternal Grandfather      Social History   Substance Use Topics    Smoking status: Never Smoker    Smokeless tobacco: Never Used    Alcohol use No        Review of Systems   Constitutional: Positive for malaise/fatigue (restless sleep). Eyes: Negative for blurred vision.    Respiratory: Negative for shortness of breath. Cardiovascular: Negative for chest pain and leg swelling. Musculoskeletal: Negative. Neurological: Negative. Negative for dizziness and headaches. All other systems reviewed and are negative. Physical Exam   Constitutional: She is oriented to person, place, and time. She appears well-developed and well-nourished. No distress. HENT:   Head: Normocephalic and atraumatic. Neck: Carotid bruit is not present. Cardiovascular: Normal rate, regular rhythm, normal heart sounds and intact distal pulses. Exam reveals no gallop and no friction rub. No murmur heard. Pulmonary/Chest: Effort normal and breath sounds normal. No respiratory distress. She has no wheezes. She has no rales. Musculoskeletal: She exhibits no edema. Neurological: She is alert and oriented to person, place, and time. Skin: She is not diaphoretic. Psychiatric: She has a normal mood and affect. Her behavior is normal. Judgment and thought content normal.   Nursing note and vitals reviewed. Diabetic foot exam:     Left: Reflexes 2+     Vibratory sensation normal    Proprioception normal   Sharp/dull discrimination normal    Filament test normal sensation with micro filament   Pulse DP: 2+ (normal)   Pulse PT: 2+ (normal)   Deformities: None  Right: Reflexes 2+   Vibratory sensation normal   Proprioception normal   Sharp/dull discrimination normal   Filament test normal sensation with micro filament   Pulse DP: 2+ (normal)   Pulse PT: 2+ (normal)   Deformities: None    ASSESSMENT and PLAN  Diagnoses and all orders for this visit:    1. Controlled type 2 diabetes mellitus without complication, without long-term current use of insulin (HCC)  -     Liraglutide (VICTOZA 2-MAYLIN) 0.6 mg/0.1 mL (18 mg/3 mL) pnij; 1.8 mg by SubCUTAneous route daily. -     metFORMIN ER (GLUCOPHAGE XR) 750 mg tablet; TAKE 1 TAB BY MOUTH DAILY.   -     Insulin Needles, Disposable, (NOVOFINE 32) 32 gauge x 1/4\" ndle; USE AS DIRECTED ONCE EVERY DAY  -     pioglitazone (ACTOS) 30 mg tablet; TAKE 1 TABLET BY MOUTH DAILY. -     AMB POC HEMOGLOBIN A1C  -     HM DIABETES FOOT EXAM  -     MICROALBUMIN, UR, RAND W/ MICROALBUMIN/CREA RATIO  -     METABOLIC PANEL, COMPREHENSIVE  -  A1c per POC today 6.2%, slightly elevated from A1c of 6.1% on 03/22/17.  - Stable, continue current regimen. 2. Hyperlipidemia with target LDL less than 100  -     atorvastatin (LIPITOR) 20 mg tablet; TAKE ONE TABLET BY MOUTH DAILY  -     LIPID PANEL  - Presumed stable, will assess levels today. 3. Essential hypertension  -     irbesartan (AVAPRO) 75 mg tablet; TAKE 1 TAB BY MOUTH NIGHTLY. - Bp control stable, continue current regimen. 4. Vitamin D deficiency  -     VITAMIN D, 25 HYDROXY  - Presumed stable, will assess levels today. 5. Obesity, Class II, BMI 35-39.9, with comorbidity        - I have reviewed/discussed the above normal BMI with the patient. I have recommended the following interventions: dietary management education, guidance, and counseling, encourage exercise and monitor weight . 6. Sleep disturbance  -     SLEEP MEDICINE REFERRAL        - I have referred pt to Dr. Olivia Bell (Sleep Medicine)  7. Encounter for immunization  -     Influenza virus vaccine (QUADRIVALENT PRES FREE SYRINGE) IM ([id number])      Follow-up Disposition:  Return in about 6 months (around 3/20/2018) for diabetes follow up. Medication risks/benefits/costs/interactions/alternatives discussed with patient. Advised patient to call back or return to office if symptoms worsen/change/persist.  If patient cannot reach us or should anything more severe/urgent arise he/she should proceed directly to the nearest emergency department. Discussed expected course/resolution/complications of diagnosis in detail with patient. Patient given a written after visit summary which includes her diagnoses, current medications and vitals.   Patient expressed understanding with the diagnosis and plan.   Written by valencia Deluna, as dictated by, Dr. Dariel Alonso MD.     I have reviewed and agree with the above note and have made corrections where appropriate, Dr. Dariel Alonso MD

## 2017-09-21 LAB
25(OH)D3+25(OH)D2 SERPL-MCNC: 21 NG/ML (ref 30–100)
ALBUMIN SERPL-MCNC: 4.4 G/DL (ref 3.5–5.5)
ALBUMIN/CREAT UR: <2.2 MG/G CREAT (ref 0–30)
ALBUMIN/GLOB SERPL: 1.5 {RATIO} (ref 1.2–2.2)
ALP SERPL-CCNC: 80 IU/L (ref 39–117)
ALT SERPL-CCNC: 13 IU/L (ref 0–32)
AST SERPL-CCNC: 17 IU/L (ref 0–40)
BILIRUB SERPL-MCNC: 0.4 MG/DL (ref 0–1.2)
BUN SERPL-MCNC: 15 MG/DL (ref 6–24)
BUN/CREAT SERPL: 19 (ref 9–23)
CALCIUM SERPL-MCNC: 9.6 MG/DL (ref 8.7–10.2)
CHLORIDE SERPL-SCNC: 103 MMOL/L (ref 96–106)
CHOLEST SERPL-MCNC: 150 MG/DL (ref 100–199)
CO2 SERPL-SCNC: 25 MMOL/L (ref 18–29)
CREAT SERPL-MCNC: 0.79 MG/DL (ref 0.57–1)
CREAT UR-MCNC: 133.6 MG/DL
GLOBULIN SER CALC-MCNC: 3 G/DL (ref 1.5–4.5)
GLUCOSE SERPL-MCNC: 104 MG/DL (ref 65–99)
HBA1C MFR BLD HPLC: 6.2 %
HDLC SERPL-MCNC: 66 MG/DL
INTERPRETATION, 910389: NORMAL
LDLC SERPL CALC-MCNC: 77 MG/DL (ref 0–99)
MICROALBUMIN UR-MCNC: <3 UG/ML
POTASSIUM SERPL-SCNC: 5 MMOL/L (ref 3.5–5.2)
PROT SERPL-MCNC: 7.4 G/DL (ref 6–8.5)
SODIUM SERPL-SCNC: 143 MMOL/L (ref 134–144)
TRIGL SERPL-MCNC: 36 MG/DL (ref 0–149)
VLDLC SERPL CALC-MCNC: 7 MG/DL (ref 5–40)

## 2017-09-30 DIAGNOSIS — E11.9 CONTROLLED TYPE 2 DIABETES MELLITUS WITHOUT COMPLICATION, WITHOUT LONG-TERM CURRENT USE OF INSULIN (HCC): ICD-10-CM

## 2017-10-01 RX ORDER — LIRAGLUTIDE 6 MG/ML
INJECTION SUBCUTANEOUS
Qty: 3 ADJUSTABLE DOSE PRE-FILLED PEN SYRINGE | Refills: 5 | Status: SHIPPED | OUTPATIENT
Start: 2017-10-01 | End: 2018-01-11 | Stop reason: SDUPTHER

## 2017-12-19 ENCOUNTER — TELEPHONE (OUTPATIENT)
Dept: FAMILY MEDICINE CLINIC | Age: 57
End: 2017-12-19

## 2017-12-19 NOTE — TELEPHONE ENCOUNTER
PA initiated for Victoza via fax [phone]. Victoza was approved coverage starting 12/19/2017 - 12/19/2020.

## 2018-01-11 DIAGNOSIS — E11.9 CONTROLLED TYPE 2 DIABETES MELLITUS WITHOUT COMPLICATION, WITHOUT LONG-TERM CURRENT USE OF INSULIN (HCC): ICD-10-CM

## 2018-01-11 RX ORDER — PIOGLITAZONEHYDROCHLORIDE 30 MG/1
TABLET ORAL
Qty: 90 TAB | Refills: 1 | Status: SHIPPED | OUTPATIENT
Start: 2018-01-11 | End: 2018-07-19 | Stop reason: SDUPTHER

## 2018-01-17 DIAGNOSIS — E11.9 CONTROLLED TYPE 2 DIABETES MELLITUS WITHOUT COMPLICATION, WITHOUT LONG-TERM CURRENT USE OF INSULIN (HCC): ICD-10-CM

## 2018-01-17 NOTE — TELEPHONE ENCOUNTER
Pharmacy is requesting a 90 day supply for insurance purposes. Pharmacy on file verified  Requested Prescriptions     Pending Prescriptions Disp Refills    Liraglutide (VICTOZA 3-MAYLIN) 0.6 mg/0.1 mL (18 mg/3 mL) pnij 3 Pen 5     Si.8 mg by SubCUTAneous route daily.

## 2018-01-22 ENCOUNTER — OFFICE VISIT (OUTPATIENT)
Dept: FAMILY MEDICINE CLINIC | Age: 58
End: 2018-01-22

## 2018-01-22 VITALS
TEMPERATURE: 98.1 F | OXYGEN SATURATION: 98 % | WEIGHT: 226 LBS | HEART RATE: 83 BPM | HEIGHT: 63 IN | BODY MASS INDEX: 40.04 KG/M2 | SYSTOLIC BLOOD PRESSURE: 120 MMHG | DIASTOLIC BLOOD PRESSURE: 78 MMHG | RESPIRATION RATE: 18 BRPM

## 2018-01-22 DIAGNOSIS — J01.40 ACUTE NON-RECURRENT PANSINUSITIS: Primary | ICD-10-CM

## 2018-01-22 DIAGNOSIS — J20.0 ACUTE BRONCHITIS DUE TO MYCOPLASMA PNEUMONIAE: ICD-10-CM

## 2018-01-22 PROBLEM — E66.01 OBESITY, MORBID (HCC): Status: ACTIVE | Noted: 2018-01-22

## 2018-01-22 RX ORDER — AZITHROMYCIN 500 MG/1
500 TABLET, FILM COATED ORAL DAILY
Qty: 5 TAB | Refills: 0 | Status: SHIPPED | OUTPATIENT
Start: 2018-01-22 | End: 2018-01-27

## 2018-01-22 RX ORDER — NAPROXEN 500 MG/1
500 TABLET ORAL 2 TIMES DAILY WITH MEALS
Qty: 20 TAB | Refills: 0 | Status: SHIPPED | OUTPATIENT
Start: 2018-01-22 | End: 2018-03-21

## 2018-01-22 NOTE — MR AVS SNAPSHOT
[facility] 
 
 
 [address] [place] [address] 
[phone] Patient: Belen Morales MRN: [id number] Visit Information Date & Time Provider Department Dept. Phone Encounter #  
 1/22/2018  2:00 PM Roda Goltz, 403 [facility] [phone] [id number] Follow-up Instructions Return if symptoms worsen or fail to improve. Your Appointments 3/21/2018  9:30 AM  
ROUTINE CARE with Rosmery Langley MD  
[facility]) Appt Note: 6 months follow up visit DM  
 [address] [address]  
[phone]  
  
   
 [address] [address] Upcoming Health Maintenance Date Due  
 PAP AKA CERVICAL CYTOLOGY 7/12/2016 EYE EXAM RETINAL OR DILATED Q1 6/27/2017 HEMOGLOBIN A1C Q6M 3/20/2018 FOOT EXAM Q1 9/20/2018 MICROALBUMIN Q1 9/20/2018 LIPID PANEL Q1 9/20/2018 BREAST CANCER SCRN MAMMOGRAM 11/16/2018 COLONOSCOPY 5/1/2020 DTaP/Tdap/Td series (2 - Td) 12/2/2021 Allergies as of 1/22/2018  Review Complete On: 1/22/2018 By: Roda Goltz, MD  
  
 Severity Noted Reaction Type Reactions Pcn [Penicillins]  05/03/2010    Rash Current Immunizations  Reviewed on 9/20/2017 Name Date Hep A Vaccine (Adult) 8/18/2017 12:00 AM  
 Hep B Vaccine 8/17/2017 Influenza Vaccine 10/22/2015 Influenza Vaccine (Quad) PF 9/20/2017, 9/21/2016  5:19 PM  
 Influenza Vaccine PF 1/19/2015, 10/28/2013 Meningococcal Vaccine 12/2/2011 Pneumococcal Polysaccharide (PPSV-23) 3/22/2017 TDAP Vaccine 12/2/2011 Typhoid Vaccine 8/11/2017 Not reviewed this visit You Were Diagnosed With   
  
 Codes Comments Acute non-recurrent pansinusitis    -  Primary ICD-10-CM: J01.40 ICD-9-CM: 461.8 Acute bronchitis due to Mycoplasma pneumoniae     ICD-10-CM: J20.0 ICD-9-CM: 466.0, 041.81 Vitals BP Pulse Temp Resp Height(growth percentile) Weight(growth percentile) 120/78 (BP 1 Location: Right arm, BP Patient Position: Sitting) 83 98.1 °F (36.7 °C) (Oral) 18 5' 3\" (1.6 m) 226 lb (102.5 kg) LMP SpO2 BMI OB Status Smoking Status 06/30/2013 98% 40.03 kg/m2 Postmenopausal Never Smoker Vitals History BMI and BSA Data Body Mass Index Body Surface Area 40.03 kg/m 2 2.13 m 2 Preferred Pharmacy Pharmacy Name Phone [facility] #[id number] Debbie Robert, [address] [phone] Your Updated Medication List  
  
   
This list is accurate as of: 1/22/18  2:17 PM.  Always use your most recent med list.  
  
  
  
  
 atorvastatin 20 mg tablet Commonly known as:  LIPITOR  
TAKE ONE TABLET BY MOUTH DAILY  
  
 azithromycin 500 mg Tab Commonly known as:  Yahir Nitish Take 1 Tab by mouth daily for 5 days. diclofenac 1 % Gel Commonly known as:  VOLTAREN Apply 4 g to affected area four (4) times daily. Insulin Needles (Disposable) 32 gauge x 1/4\" Ndle Commonly known as:  NOVOFINE 32 USE AS DIRECTED ONCE EVERY DAY  
  
 irbesartan 75 mg tablet Commonly known as:  AVAPRO TAKE 1 TAB BY MOUTH NIGHTLY. * Liraglutide 0.6 mg/0.1 mL (18 mg/3 mL) Pnij Commonly known as:  VICTOZA 2-MAYLIN  
1.8 mg by SubCUTAneous route daily. * Liraglutide 0.6 mg/0.1 mL (18 mg/3 mL) Pnij Commonly known as:  VICTOZA 3-MAYLIN  
1.8 mg by SubCUTAneous route daily. metFORMIN  mg tablet Commonly known as:  GLUCOPHAGE XR  
TAKE 1 TAB BY MOUTH DAILY. naproxen 500 mg tablet Commonly known as:  NAPROSYN Take 1 Tab by mouth two (2) times daily (with meals). ONETOUCH ULTRA TEST strip Generic drug:  glucose blood VI test strips USE DAILY AS DIRECTED  
  
 pioglitazone 30 mg tablet Commonly known as:  ACTOS  
TAKE 1 TABLET BY MOUTH DAILY. * Notice: This list has 2 medication(s) that are the same as other medications prescribed for you. Read the directions carefully, and ask your doctor or other care provider to review them with you. Prescriptions Sent to Pharmacy Refills  
 azithromycin (ZITHROMAX) 500 mg tab 0 Sig: Take 1 Tab by mouth daily for 5 days. Class: Normal  
 Pharmacy: [facility]/pharmacy [address] [place] [address] Ph #: [phone] Route: Oral  
 naproxen (NAPROSYN) 500 mg tablet 0 Sig: Take 1 Tab by mouth two (2) times daily (with meals). Class: Normal  
 Pharmacy: [facility]/pharmacy [address] [place] [address] Ph #: [phone] Route: Oral  
  
Follow-up Instructions Return if symptoms worsen or fail to improve. Patient Instructions Bronchitis: Care Instructions Your Care Instructions Bronchitis is inflammation of the bronchial tubes, which carry air to the lungs. The tubes swell and produce mucus, or phlegm. The mucus and inflamed bronchial tubes make you cough. You may have trouble breathing. Most cases of bronchitis are caused by viruses like those that cause colds. Antibiotics usually do not help and they may be harmful. Bronchitis usually develops rapidly and lasts about 2 to 3 weeks in otherwise healthy people. Follow-up care is a key part of your treatment and safety. Be sure to make and go to all appointments, and call your doctor if you are having problems. It's also a good idea to know your test results and keep a list of the medicines you take. How can you care for yourself at home? · Take all medicines exactly as prescribed. Call your doctor if you think you are having a problem with your medicine.  
· Get some extra rest. 
· Take an over-the-counter pain medicine, such as acetaminophen (Tylenol), ibuprofen (Advil, Motrin), or naproxen (Aleve) to reduce fever and relieve body aches. Read and follow all instructions on the label. · Do not take two or more pain medicines at the same time unless the doctor told you to. Many pain medicines have acetaminophen, which is Tylenol. Too much acetaminophen (Tylenol) can be harmful. · Take an over-the-counter cough medicine that contains dextromethorphan to help quiet a dry, hacking cough so that you can sleep. Avoid cough medicines that have more than one active ingredient. Read and follow all instructions on the label. · Breathe moist air from a humidifier, hot shower, or sink filled with hot water. The heat and moisture will thin mucus so you can cough it out. · Do not smoke. Smoking can make bronchitis worse. If you need help quitting, talk to your doctor about stop-smoking programs and medicines. These can increase your chances of quitting for good. When should you call for help? Call 911 anytime you think you may need emergency care. For example, call if: 
? · You have severe trouble breathing. ?Call your doctor now or seek immediate medical care if: 
? · You have new or worse trouble breathing. ? · You cough up dark brown or bloody mucus (sputum). ? · You have a new or higher fever. ? · You have a new rash. ? Watch closely for changes in your health, and be sure to contact your doctor if: 
? · You cough more deeply or more often, especially if you notice more mucus or a change in the color of your mucus. ? · You are not getting better as expected. Where can you learn more? Go to http://nahomy-najma.info/. Enter H333 in the search box to learn more about \"Bronchitis: Care Instructions. \" Current as of: May 12, 2017 Content Version: 11.4 © 3196-6519 [employer]. Care instructions adapted under license by [employer] (which disclaims liability or warranty for this information).  If you have questions about a medical condition or this instruction, always ask your healthcare professional. [place] [address] any warranty or liability for your use of this information. Introducing [facility] & HEALTH SERVICES! [employer] introduces Mr. Youth patient portal. Now you can access parts of your medical record, email your doctor's office, and request medication refills online. 1. In your internet browser, go to https://[employer]. [employer] 2. Click on the First Time User? Click Here link in the Sign In box. You will see the New Member Sign Up page. 3. Enter your Mr. Youth Access Code exactly as it appears below. You will not need to use this code after youve completed the sign-up process. If you do not sign up before the expiration date, you must request a new code. · Mr. Youth Access Code: [email or web address] Expires: 4/22/2018  1:51 PM 
 
4. Enter the last four digits of your Social Security Number (xxxx) and Date of Birth (mm/dd/yyyy) as indicated and click Submit. You will be taken to the next sign-up page. 5. Create a Mr. Youth ID. This will be your Mr. Youth login ID and cannot be changed, so think of one that is secure and easy to remember. 6. Create a Mr. Youth password. You can change your password at any time. 7. Enter your Password Reset Question and Answer. This can be used at a later time if you forget your password. 8. Enter your e-mail address. You will receive e-mail notification when new information is available in [address] 9. Click Sign Up. You can now view and download portions of your medical record. 10. Click the Download Summary menu link to download a portable copy of your medical information. If you have questions, please visit the Frequently Asked Questions section of the Mr. Youth website. Remember, Mr. Youth is NOT to be used for urgent needs. For medical emergencies, dial 911. Now available from your iPhone and Android! Please provide this summary of care documentation to your next provider. Your primary care clinician is listed as Felipe Batron. If you have any questions after today's visit, please call [phone].

## 2018-01-22 NOTE — PATIENT INSTRUCTIONS
Bronchitis: Care Instructions  Your Care Instructions    Bronchitis is inflammation of the bronchial tubes, which carry air to the lungs. The tubes swell and produce mucus, or phlegm. The mucus and inflamed bronchial tubes make you cough. You may have trouble breathing. Most cases of bronchitis are caused by viruses like those that cause colds. Antibiotics usually do not help and they may be harmful. Bronchitis usually develops rapidly and lasts about 2 to 3 weeks in otherwise healthy people. Follow-up care is a key part of your treatment and safety. Be sure to make and go to all appointments, and call your doctor if you are having problems. It's also a good idea to know your test results and keep a list of the medicines you take. How can you care for yourself at home? · Take all medicines exactly as prescribed. Call your doctor if you think you are having a problem with your medicine. · Get some extra rest.  · Take an over-the-counter pain medicine, such as acetaminophen (Tylenol), ibuprofen (Advil, Motrin), or naproxen (Aleve) to reduce fever and relieve body aches. Read and follow all instructions on the label. · Do not take two or more pain medicines at the same time unless the doctor told you to. Many pain medicines have acetaminophen, which is Tylenol. Too much acetaminophen (Tylenol) can be harmful. · Take an over-the-counter cough medicine that contains dextromethorphan to help quiet a dry, hacking cough so that you can sleep. Avoid cough medicines that have more than one active ingredient. Read and follow all instructions on the label. · Breathe moist air from a humidifier, hot shower, or sink filled with hot water. The heat and moisture will thin mucus so you can cough it out. · Do not smoke. Smoking can make bronchitis worse. If you need help quitting, talk to your doctor about stop-smoking programs and medicines. These can increase your chances of quitting for good.   When should you call for help? Call 911 anytime you think you may need emergency care. For example, call if:  ? · You have severe trouble breathing. ?Call your doctor now or seek immediate medical care if:  ? · You have new or worse trouble breathing. ? · You cough up dark brown or bloody mucus (sputum). ? · You have a new or higher fever. ? · You have a new rash. ? Watch closely for changes in your health, and be sure to contact your doctor if:  ? · You cough more deeply or more often, especially if you notice more mucus or a change in the color of your mucus. ? · You are not getting better as expected. Where can you learn more? Go to http://nahomy-najma.info/. Enter H333 in the search box to learn more about \"Bronchitis: Care Instructions. \"  Current as of: May 12, 2017  Content Version: 11.4  © 8584-5155 [employer]. Care instructions adapted under license by [employer] (which disclaims liability or warranty for this information). If you have questions about a medical condition or this instruction, always ask your healthcare professional. [address] any warranty or liability for your use of this information.

## 2018-01-22 NOTE — PROGRESS NOTES
HISTORY OF PRESENT ILLNESS  Radha Gomez is a 62 y.o. female. She was seen for bilateral ear pain,pressure, cough, chest tightness, fatigue x 5 days . She was sick before her travel to [facility], and got worst when she was there  HPI  URI Review  King Cobb returns to clinic today to talk about: congestion, sore throat, dry cough, myalgias, bilateral sinus pain, bilateral ear pain, pressure and URI symptoms for abrupt and 5 days ago, which are gradually worsening since that time. She reports night sweats, cough described as nonproductive, painful, hoarse, hoarseness, sinus congestion and chest congestion. She denies a history of: coryza, fever, chills, rhinorrhea, wheezing and SOB/WASHBURN. Treatments have included: decongestants, cough suppressants, which have been not very effective. Relevant PMH: DM.  Patient reports sick contacts: no.        Review of Systems   Constitutional: Positive for malaise/fatigue. Negative for chills and fever. HENT: Positive for congestion, ear pain and sore throat. Negative for tinnitus. Eyes: Negative for blurred vision, double vision, pain and discharge. Respiratory: Positive for cough. Negative for shortness of breath and wheezing. Cardiovascular: Negative for chest pain, palpitations and leg swelling. Gastrointestinal: Negative for abdominal pain, blood in stool, constipation, diarrhea, nausea and vomiting. Genitourinary: Negative for dysuria, frequency, hematuria and urgency. Musculoskeletal: Negative for back pain, joint pain and myalgias. Skin: Negative for rash. Neurological: Negative for dizziness, tremors, seizures and headaches. Endo/Heme/Allergies: Negative for polydipsia. Does not bruise/bleed easily. Psychiatric/Behavioral: Negative for depression and substance abuse. The patient is not nervous/anxious. Physical Exam   Constitutional: No distress. HENT:   Right Ear: No drainage. Tympanic membrane is injected and bulging.  No middle ear effusion. No decreased hearing is noted. Left Ear: No drainage. Tympanic membrane is injected and bulging. No middle ear effusion. No decreased hearing is noted. Nose: Mucosal edema present. No rhinorrhea. Right sinus exhibits maxillary sinus tenderness and frontal sinus tenderness. Left sinus exhibits maxillary sinus tenderness and frontal sinus tenderness. Mouth/Throat: Uvula is midline. Posterior oropharyngeal edema and posterior oropharyngeal erythema present. No oropharyngeal exudate. Nasal mucosa congested, edematous   Cardiovascular: Regular rhythm and normal heart sounds. No murmur heard. Pulmonary/Chest: Effort normal and breath sounds normal. She has no wheezes. She has no rales. Lymphadenopathy:        Head (right side): No tonsillar adenopathy present. Head (left side): No tonsillar adenopathy present. She has no cervical adenopathy. Nursing note and vitals reviewed. ASSESSMENT and PLAN  Diagnoses and all orders for this visit:    1. Acute non-recurrent pansinusitis  -     azithromycin (ZITHROMAX) 500 mg tab; Take 1 Tab by mouth daily for 5 days.  -     naproxen (NAPROSYN) 500 mg tablet; Take 1 Tab by mouth two (2) times daily (with meals). 2. Acute bronchitis due to Mycoplasma pneumoniae  -     azithromycin (ZITHROMAX) 500 mg tab; Take 1 Tab by mouth daily for 5 days.  -     naproxen (NAPROSYN) 500 mg tablet; Take 1 Tab by mouth two (2) times daily (with meals). Discussed lifestyle issues and health guidance given  Patient was given an after visit summary which includes diagnoses, vital signs, current medications, instructions and references & authorized prescriptions . Pt verbalized instructions I provided and expressed understanding of discussion that was held today. Follow-up Disposition:  Return if symptoms worsen or fail to improve.

## 2018-03-05 RX ORDER — NAPHAZOLINE HYDROCHLORIDE AND POLYETHYLENE GLYCOL 300 .1; 2 MG/ML; MG/ML
SOLUTION/ DROPS OPHTHALMIC
Qty: 100 SYRINGE | Refills: 5 | Status: SHIPPED | OUTPATIENT
Start: 2018-03-05 | End: 2019-02-04

## 2018-03-05 NOTE — TELEPHONE ENCOUNTER
received fax from pharmacy stating  Novofine 32G needles not covered.     BD Ultra fine needs are covered

## 2018-03-13 DIAGNOSIS — I10 ESSENTIAL HYPERTENSION: ICD-10-CM

## 2018-03-14 RX ORDER — IRBESARTAN 75 MG/1
TABLET ORAL
Qty: 90 TAB | Refills: 1 | Status: SHIPPED | OUTPATIENT
Start: 2018-03-14 | End: 2018-09-07 | Stop reason: SDUPTHER

## 2018-03-21 ENCOUNTER — OFFICE VISIT (OUTPATIENT)
Dept: FAMILY MEDICINE CLINIC | Age: 58
End: 2018-03-21

## 2018-03-21 VITALS
BODY MASS INDEX: 39.16 KG/M2 | TEMPERATURE: 97.2 F | HEART RATE: 77 BPM | SYSTOLIC BLOOD PRESSURE: 138 MMHG | HEIGHT: 63 IN | RESPIRATION RATE: 14 BRPM | WEIGHT: 221 LBS | DIASTOLIC BLOOD PRESSURE: 68 MMHG | OXYGEN SATURATION: 100 %

## 2018-03-21 DIAGNOSIS — E66.01 OBESITY, MORBID (HCC): ICD-10-CM

## 2018-03-21 DIAGNOSIS — E11.9 CONTROLLED TYPE 2 DIABETES MELLITUS WITHOUT COMPLICATION, WITHOUT LONG-TERM CURRENT USE OF INSULIN (HCC): Primary | ICD-10-CM

## 2018-03-21 DIAGNOSIS — I10 ESSENTIAL HYPERTENSION: ICD-10-CM

## 2018-03-21 DIAGNOSIS — E78.5 HYPERLIPIDEMIA WITH TARGET LDL LESS THAN 100: ICD-10-CM

## 2018-03-21 LAB — HBA1C MFR BLD HPLC: 6.3 %

## 2018-03-21 NOTE — PATIENT INSTRUCTIONS
Body Mass Index: Care Instructions  Your Care Instructions    Body mass index (BMI) can help you see if your weight is raising your risk for health problems. It uses a formula to compare how much you weigh with how tall you are. · A BMI lower than 18.5 is considered underweight. · A BMI between 18.5 and 24.9 is considered healthy. · A BMI between 25 and 29.9 is considered overweight. A BMI of 30 or higher is considered obese. If your BMI is in the normal range, it means that you have a lower risk for weight-related health problems. If your BMI is in the overweight or obese range, you may be at increased risk for weight-related health problems, such as high blood pressure, heart disease, stroke, arthritis or joint pain, and diabetes. If your BMI is in the underweight range, you may be at increased risk for health problems such as fatigue, lower protection (immunity) against illness, muscle loss, bone loss, hair loss, and hormone problems. BMI is just one measure of your risk for weight-related health problems. You may be at higher risk for health problems if you are not active, you eat an unhealthy diet, or you drink too much alcohol or use tobacco products. Follow-up care is a key part of your treatment and safety. Be sure to make and go to all appointments, and call your doctor if you are having problems. It's also a good idea to know your test results and keep a list of the medicines you take. How can you care for yourself at home? · Practice healthy eating habits. This includes eating plenty of fruits, vegetables, whole grains, lean protein, and low-fat dairy. · If your doctor recommends it, get more exercise. Walking is a good choice. Bit by bit, increase the amount you walk every day. Try for at least 30 minutes on most days of the week. · Do not smoke. Smoking can increase your risk for health problems. If you need help quitting, talk to your doctor about stop-smoking programs and medicines. These can increase your chances of quitting for good. · Limit alcohol to 2 drinks a day for men and 1 drink a day for women. Too much alcohol can cause health problems. If you have a BMI higher than 25  · Your doctor may do other tests to check your risk for weight-related health problems. This may include measuring the distance around your waist. A waist measurement of more than 40 inches in men or 35 inches in women can increase the risk of weight-related health problems. · Talk with your doctor about steps you can take to stay healthy or improve your health. You may need to make lifestyle changes to lose weight and stay healthy, such as changing your diet and getting regular exercise. If you have a BMI lower than 18.5  · Your doctor may do other tests to check your risk for health problems. · Talk with your doctor about steps you can take to stay healthy or improve your health. You may need to make lifestyle changes to gain or maintain weight and stay healthy, such as getting more healthy foods in your diet and doing exercises to build muscle. Where can you learn more? Go to http://nahomy-najma.info/. Enter S176 in the search box to learn more about \"Body Mass Index: Care Instructions. \"  Current as of: October 13, 2016  Content Version: 11.4  © 7688-9856 [employer]. Care instructions adapted under license by [employer] (which disclaims liability or warranty for this information). If you have questions about a medical condition or this instruction, always ask your healthcare professional. [address] any warranty or liability for your use of this information.

## 2018-03-21 NOTE — LETTER
3/28/2018 6:29 PM 
 
Ms. Luz Elena Redmond [address] [facility] Dear Luz Elena Redmond: 
 
Please find your most recent results below. Resulted Orders AMB POC HEMOGLOBIN A1C Result Value Ref Range Hemoglobin A1c (POC) 6.3 % METABOLIC PANEL, COMPREHENSIVE Result Value Ref Range Glucose 96 65 - 99 mg/dL BUN 15 6 - 24 mg/dL Creatinine 0.76 0.57 - 1.00 mg/dL GFR est non-AA 87 >59 mL/min/1.73 GFR est  >59 mL/min/1.73  
 BUN/Creatinine ratio 20 9 - 23 Sodium 144 134 - 144 mmol/L Potassium 4.3 3.5 - 5.2 mmol/L Chloride 104 96 - 106 mmol/L  
 CO2 24 18 - 29 mmol/L Calcium 9.6 8.7 - 10.2 mg/dL Protein, total 7.4 6.0 - 8.5 g/dL Albumin 4.8 3.5 - 5.5 g/dL GLOBULIN, TOTAL 2.6 1.5 - 4.5 g/dL A-G Ratio 1.8 1.2 - 2.2 Bilirubin, total 0.4 0.0 - 1.2 mg/dL Alk. phosphatase 80 39 - 117 IU/L  
 AST (SGOT) 20 0 - 40 IU/L  
 ALT (SGPT) 15 0 - 32 IU/L Narrative Performed at:  [address]  [id number] : Delmis Khan MD, Phone:  [phone] LIPID PANEL Result Value Ref Range Cholesterol, total 148 100 - 199 mg/dL Triglyceride 36 0 - 149 mg/dL HDL Cholesterol 65 >39 mg/dL VLDL, calculated 7 5 - 40 mg/dL LDL, calculated 76 0 - 99 mg/dL Narrative Performed at:  [address]  [id number] : Delmis Khan MD, Phone:  [phone] HEMOGLOBIN A1C WITH EAG Result Value Ref Range Hemoglobin A1c 6.4 (H) 4.8 - 5.6 % Comment:  
            Pre-diabetes: 5.7 - 6.4 Diabetes: >6.4 Glycemic control for adults with diabetes: <7.0 Estimated average glucose 137 mg/dL Narrative Performed at:  [address]  [id number] : Delmis Khan MD, Phone:  [phone] MICROALBUMIN, UR, RAND W/ MICROALB/CREAT RATIO Result Value Ref Range Creatinine, urine 144.3 Not Estab. mg/dL Microalbumin, urine 5.7 Not Estab. ug/mL Microalb/Creat ratio (ug/mg creat.) 4.0 0.0 - 30.0 mg/g creat Narrative Performed at:  [address]  [id number] : Samantha Cisneros MD, Phone:  [phone] CVD REPORT Result Value Ref Range INTERPRETATION Note Comment:  
   Supplemental report is available. Narrative Performed at:  [address] [place] [address] [address] [place] [address]  [id number] : Cecelia Green PhD, Phone:  [phone] None. Keep up the good work! Continue with current  diet and medications. See you in 6 months Please call me if you have any questions: [phone] Sincerely, Reyna Bangura MD

## 2018-03-21 NOTE — PROGRESS NOTES
Charlie Cates is a 62 y.o. female    1. Have you been to the ER, urgent care clinic since your last visit? Hospitalized since your last visit? No    2. Have you seen or consulted any other health care providers outside of the [address] [place] [address] since your last visit? Include any pap smears or colon screening. No     Chief Complaint   Patient presents with    Diabetes     6 month follow up    Hypertension     6 month follow up    Cholesterol Problem     6 month follow up     Fasting    Eye exam scheduled for next week on the 28th. Pap smear done 12/27/2017 with .

## 2018-03-21 NOTE — ASSESSMENT & PLAN NOTE
This condition is managed by Specialist.  Key Obesity Meds             Liraglutide (VICTOZA 2-MAYLIN) 0.6 mg/0.1 mL (18 mg/3 mL) pnij  (Taking) 1.8 mg by SubCUTAneous route daily. metFORMIN ER (GLUCOPHAGE XR) 750 mg tablet  (Taking) TAKE 1 TAB BY MOUTH DAILY.         Lab Results   Component Value Date/Time    Glucose 104 09/20/2017 10:37 AM    Cholesterol, total 150 09/20/2017 10:37 AM    HDL Cholesterol 66 09/20/2017 10:37 AM    LDL, calculated 77 09/20/2017 10:37 AM    Triglyceride 36 09/20/2017 10:37 AM    Sodium 143 09/20/2017 10:37 AM    Potassium 5.0 09/20/2017 10:37 AM    ALT (SGPT) 13 09/20/2017 10:37 AM    AST (SGOT) 17 09/20/2017 10:37 AM    VITAMIN D, 25-HYDROXY 21.0 09/20/2017 10:37 AM

## 2018-03-21 NOTE — MR AVS SNAPSHOT
[address] [place] [address] 
[phone] Patient: Sonali Mcmanus MRN: [id number] ALS:[id number] Visit Information Date & Time Provider Department Dept. Phone Encounter #  
 3/21/2018  9:30 AM Charlie Keith MD [facility] [phone] [id number] Follow-up Instructions Return in about 6 months (around 9/21/2018) for hypertension, diabetes, cholesterol follow up. Upcoming Health Maintenance Date Due  
 PAP AKA CERVICAL CYTOLOGY 7/12/2016 EYE EXAM RETINAL OR DILATED Q1 6/27/2017 HEMOGLOBIN A1C Q6M 3/20/2018 FOOT EXAM Q1 9/20/2018 MICROALBUMIN Q1 9/20/2018 LIPID PANEL Q1 9/20/2018 BREAST CANCER SCRN MAMMOGRAM 12/28/2019 COLONOSCOPY 5/1/2020 DTaP/Tdap/Td series (2 - Td) 12/2/2021 Allergies as of 3/21/2018  Review Complete On: 3/21/2018 By: Charlie Keith MD  
  
 Severity Noted Reaction Type Reactions Pcn [Penicillins]  05/03/2010    Rash Current Immunizations  Reviewed on 9/20/2017 Name Date Hep A Vaccine (Adult) 8/18/2017 12:00 AM  
 Hep B Vaccine 8/17/2017 Influenza Vaccine 10/22/2015 Influenza Vaccine (Quad) PF 9/20/2017, 9/21/2016  5:19 PM  
 Influenza Vaccine PF 1/19/2015, 10/28/2013 Meningococcal Vaccine 12/2/2011 Pneumococcal Polysaccharide (PPSV-23) 3/22/2017 TDAP Vaccine 12/2/2011 Typhoid Vaccine 8/11/2017 Not reviewed this visit You Were Diagnosed With   
  
 Codes Comments Controlled type 2 diabetes mellitus without complication, without long-term current use of insulin ([facility] [address]    -  Primary ICD-10-CM: E11.9 ICD-9-CM: 250.00 Essential hypertension     ICD-10-CM: I10 
ICD-9-CM: 401.9 Hyperlipidemia with target LDL less than 100     ICD-10-CM: E78.5 ICD-9-CM: 272.4 Obesity, morbid ([address]     ICD-10-CM: E66.01 
ICD-9-CM: 278.01 Vitals BP Pulse Temp Resp Height(growth percentile) Weight(growth percentile) 138/68 77 97.2 °F (36.2 °C) (Oral) 14 5' 3\" (1.6 m) 221 lb (100.2 kg) LMP SpO2 BMI OB Status Smoking Status 06/30/2013 100% 39.15 kg/m2 Postmenopausal Never Smoker Vitals History BMI and BSA Data Body Mass Index Body Surface Area  
 39.15 kg/m 2 2.11 m 2 Preferred Pharmacy Pharmacy Name Phone [id number] [address], [address] [facility] [phone] Your Updated Medication List  
  
   
This list is accurate as of 3/21/18 11:10 AM.  Always use your most recent med list.  
  
  
  
  
 atorvastatin 20 mg tablet Commonly known as:  LIPITOR  
TAKE ONE TABLET BY MOUTH DAILY Insulin Needles (Disposable) 32 gauge x 1/4\" Ndle Commonly known as:  NOVOFINE 32 USE AS DIRECTED ONCE EVERY DAY Insulin Syringe-Needle U-100 0.5 mL 29 gauge x 1/2\" Syrg Commonly known as:  INSULIN SYRINGE ULTRAFINE Use as directed once every day  
  
 irbesartan 75 mg tablet Commonly known as:  AVAPRO TAKE 1 TABLET BY MOUTH NIGHTLY. Liraglutide 0.6 mg/0.1 mL (18 mg/3 mL) Pnij Commonly known as:  VICTOZA 2-MAYLIN  
1.8 mg by SubCUTAneous route daily. metFORMIN  mg tablet Commonly known as:  GLUCOPHAGE XR  
TAKE 1 TAB BY MOUTH DAILY. ONETOUCH ULTRA TEST strip Generic drug:  glucose blood VI test strips USE DAILY AS DIRECTED  
  
 pioglitazone 30 mg tablet Commonly known as:  ACTOS  
TAKE 1 TABLET BY MOUTH DAILY. We Performed the Following AMB POC HEMOGLOBIN A1C [76361 CPT(R)] HEMOGLOBIN A1C WITH EAG [24020 CPT(R)]  DIABETES FOOT EXAM [HM7 Custom] LIPID PANEL [50380 CPT(R)] METABOLIC PANEL, COMPREHENSIVE [07234 CPT(R)] MICROALBUMIN, UR, RAND W/ MICROALB/CREAT RATIO [id number] CPT(R)] Follow-up Instructions Return in about 6 months (around 9/21/2018) for hypertension, diabetes, cholesterol follow up. Patient Instructions Body Mass Index: Care Instructions Your Care Instructions Body mass index (BMI) can help you see if your weight is raising your risk for health problems. It uses a formula to compare how much you weigh with how tall you are. · A BMI lower than 18.5 is considered underweight. · A BMI between 18.5 and 24.9 is considered healthy. · A BMI between 25 and 29.9 is considered overweight. A BMI of 30 or higher is considered obese. If your BMI is in the normal range, it means that you have a lower risk for weight-related health problems. If your BMI is in the overweight or obese range, you may be at increased risk for weight-related health problems, such as high blood pressure, heart disease, stroke, arthritis or joint pain, and diabetes. If your BMI is in the underweight range, you may be at increased risk for health problems such as fatigue, lower protection (immunity) against illness, muscle loss, bone loss, hair loss, and hormone problems. BMI is just one measure of your risk for weight-related health problems. You may be at higher risk for health problems if you are not active, you eat an unhealthy diet, or you drink too much alcohol or use tobacco products. Follow-up care is a key part of your treatment and safety. Be sure to make and go to all appointments, and call your doctor if you are having problems. It's also a good idea to know your test results and keep a list of the medicines you take. How can you care for yourself at home? · Practice healthy eating habits. This includes eating plenty of fruits, vegetables, whole grains, lean protein, and low-fat dairy. · If your doctor recommends it, get more exercise. Walking is a good choice. Bit by bit, increase the amount you walk every day. Try for at least 30 minutes on most days of the week. · Do not smoke. Smoking can increase your risk for health problems.  If you need help quitting, talk to your doctor about stop-smoking programs and medicines. These can increase your chances of quitting for good. · Limit alcohol to 2 drinks a day for men and 1 drink a day for women. Too much alcohol can cause health problems. If you have a BMI higher than 25 · Your doctor may do other tests to check your risk for weight-related health problems. This may include measuring the distance around your waist. A waist measurement of more than 40 inches in men or 35 inches in women can increase the risk of weight-related health problems. · Talk with your doctor about steps you can take to stay healthy or improve your health. You may need to make lifestyle changes to lose weight and stay healthy, such as changing your diet and getting regular exercise. If you have a BMI lower than 18.5 · Your doctor may do other tests to check your risk for health problems. · Talk with your doctor about steps you can take to stay healthy or improve your health. You may need to make lifestyle changes to gain or maintain weight and stay healthy, such as getting more healthy foods in your diet and doing exercises to build muscle. Where can you learn more? Go to http://nahomy-najma.info/. Enter S176 in the search box to learn more about \"Body Mass Index: Care Instructions. \" Current as of: October 13, 2016 Content Version: 11.4 © 2688-8096 Healthwise, [employer]. Care instructions adapted under license by [employer] (which disclaims liability or warranty for this information). If you have questions about a medical condition or this instruction, always ask your healthcare professional. [place] [address] any warranty or liability for your use of this information. Introducing [address]! Dear Tommy Medina: Thank you for requesting a [employer] account. Our records indicate that you already have an active [employer] account. You can access your account anytime at [email or web address] Did you know that you can access your hospital and ER discharge instructions at any time in [employer]? You can also review all of your test results from your hospital stay or ER visit. Additional Information If you have questions, please visit the Frequently Asked Questions section of the [employer] website at [email or web address] Remember, [employer]eor is NOT to be used for urgent needs. For medical emergencies, dial 911. Now available from your iPhone and Android! Please provide this summary of care documentation to your next provider. Your primary care clinician is listed as Katiuska Santiago. If you have any questions after today's visit, please call [phone].

## 2018-03-21 NOTE — PROGRESS NOTES
HISTORY OF PRESENT ILLNESS  Delmis Haley is a 62 y.o. female. Blood pressure 138/68, pulse 77, temperature 97.2 °F (36.2 °C), temperature source Oral, resp. rate 14, height 5' 3\" (1.6 m), weight 221 lb (100.2 kg), last menstrual period 06/30/2013, SpO2 100 %. Body mass index is 39.15 kg/(m^2). Chief Complaint   Patient presents with    Diabetes     6 month follow up    Hypertension     6 month follow up    Cholesterol Problem     6 month follow up        HPI  Delmis Haley 62 y.o. female  presents to the office today for follow up on chronic conditions. Hypertension: Bp at office today 142/78, 138/68 by manual arm cuff recheck. Pt continues with Irbesartan 75mg daily. Pt states that she has not been checking her Bp at home, but has not had any symptoms. Advised pt to keep an eye on her blood pressure at home. Recommended that pt lose some weight and decrease sodium in her diet. Discussed with pt that if her Bp continues to be elevated, follow up with me and we can reevaluate her medication. Hyperlipidemia: Lipid panel on 09/20/17 notable for total cholesterol 150, LDL 77, HDL 66, and triglycerides 36. Pt continues with Atorvastatin 20mg daily. Presumed stable, will assess levels today. DM2: A1c per POC today 6.3%, slightly elevated from A1c of 6.2% on 09/20/17. Pt continues with Metformin 750mg daily, Actos 30mg daily, and Victoza 1.8mg daily. Advised pt to continue to work on her diet and exercise. Health maintenance: Pt states that she has recently enrolled in a weight loss program and has been working on her diet and exercise. Pt reports that she saw Dr. Deshaun Balderas (OB-GYN) on 12/27/17 for a mammogram and a pap smear. She has an eye exam scheduled with Dr. Meir Mclaughlin (Ophthalmology) in 1 week for her retinal exam.     Current Outpatient Prescriptions   Medication Sig Dispense Refill    irbesartan (AVAPRO) 75 mg tablet TAKE 1 TABLET BY MOUTH NIGHTLY.  90 Tab 1    Insulin Syringe-Needle U-100 (INSULIN SYRINGE ULTRAFINE) 0.5 mL 29 gauge x 1/2\" syrg Use as directed once every day 100 Syringe 5    pioglitazone (ACTOS) 30 mg tablet TAKE 1 TABLET BY MOUTH DAILY. 90 Tab 1    atorvastatin (LIPITOR) 20 mg tablet TAKE ONE TABLET BY MOUTH DAILY 90 Tab 1    Liraglutide (VICTOZA 2-MAYLIN) 0.6 mg/0.1 mL (18 mg/3 mL) pnij 1.8 mg by SubCUTAneous route daily. 3 Adjustable Dose Pre-filled Pen Syringe 5    metFORMIN ER (GLUCOPHAGE XR) 750 mg tablet TAKE 1 TAB BY MOUTH DAILY. 90 Tab 1    Insulin Needles, Disposable, (NOVOFINE 32) 32 gauge x 1/4\" ndle USE AS DIRECTED ONCE EVERY  Pen Needle 5    ONETOUCH ULTRA TEST strip USE DAILY AS DIRECTED 100 Strip 11     Allergies   Allergen Reactions    Pcn [Penicillins] Rash     Past Medical History:   Diagnosis Date    Diabetes ([facility] [address]     Hypercholesterolemia     Hypertension      History reviewed. No pertinent surgical history. Family History   Problem Relation Age of Onset    Diabetes Mother     Diabetes Father     Diabetes Maternal Aunt     Diabetes Maternal Grandmother     Diabetes Maternal Grandfather     Diabetes Paternal Grandmother     Diabetes Paternal Grandfather      Social History   Substance Use Topics    Smoking status: Never Smoker    Smokeless tobacco: Never Used    Alcohol use No        Review of Systems   Constitutional: Negative. Negative for malaise/fatigue. Eyes: Negative for blurred vision. Respiratory: Negative for shortness of breath. Cardiovascular: Negative for chest pain and leg swelling. Musculoskeletal: Negative. Neurological: Negative. Negative for dizziness and headaches. All other systems reviewed and are negative. Physical Exam   Constitutional: She is oriented to person, place, and time. She appears well-developed and well-nourished. No distress. HENT:   Head: Normocephalic and atraumatic. Neck: Carotid bruit is not present.    Cardiovascular: Normal rate, regular rhythm, normal heart sounds and intact distal pulses. Exam reveals no gallop and no friction rub. No murmur heard. Pulmonary/Chest: Effort normal and breath sounds normal. No respiratory distress. She has no wheezes. She has no rales. Musculoskeletal: She exhibits no edema. Neurological: She is alert and oriented to person, place, and time. Skin: She is not diaphoretic. Psychiatric: She has a normal mood and affect. Her behavior is normal. Judgment and thought content normal.   Nursing note and vitals reviewed. Diabetic foot exam:     Left: Reflexes 2+     Vibratory sensation normal    Proprioception normal   Sharp/dull discrimination normal    Filament test normal sensation with micro filament   Pulse DP: 2+ (normal)   Pulse PT: 2+ (normal)   Deformities: None  Right: Reflexes 2+   Vibratory sensation normal   Proprioception normal   Sharp/dull discrimination normal   Filament test normal sensation with micro filament   Pulse DP: 2+ (normal)   Pulse PT: 2+ (normal)   Deformities: None     ASSESSMENT and PLAN  Diagnoses and all orders for this visit:    1. Controlled type 2 diabetes mellitus without complication, without long-term current use of insulin (HCC)  Assessment & Plan:  Stable, based on history, physical exam and review of pertinent labs, studies and medications; meds reconciled; continue current treatment plan. Key Antihyperglycemic Medications             pioglitazone (ACTOS) 30 mg tablet  (Taking) TAKE 1 TABLET BY MOUTH DAILY. Liraglutide (VICTOZA 2-MAYLIN) 0.6 mg/0.1 mL (18 mg/3 mL) pnij  (Taking) 1.8 mg by SubCUTAneous route daily. metFORMIN ER (GLUCOPHAGE XR) 750 mg tablet  (Taking) TAKE 1 TAB BY MOUTH DAILY. Other Key Diabetic Medications             irbesartan (AVAPRO) 75 mg tablet  (Taking) TAKE 1 TABLET BY MOUTH NIGHTLY.     atorvastatin (LIPITOR) 20 mg tablet  (Taking) TAKE ONE TABLET BY MOUTH DAILY        Lab Results   Component Value Date/Time    Hemoglobin A1c (POC) 6.3 03/21/2018 09:49 AM    Glucose 104 09/20/2017 10:37 AM    Creatinine 0.79 09/20/2017 10:37 AM    Microalb/Creat ratio (ug/mg creat.) <2.2 09/20/2017 10:36 AM    Cholesterol, total 150 09/20/2017 10:37 AM    HDL Cholesterol 66 09/20/2017 10:37 AM    LDL, calculated 77 09/20/2017 10:37 AM    Triglyceride 36 09/20/2017 10:37 AM     Diabetic Foot and Eye Exam  Status   Topic Date Due    Eye Exam  06/27/2017    Diabetic Foot Care  09/20/2018       Orders:  -     AMB POC HEMOGLOBIN A6R  -     METABOLIC PANEL, COMPREHENSIVE  -     HEMOGLOBIN A1C WITH EAG  -     HM DIABETES FOOT EXAM  -     MICROALBUMIN, UR, RAND W/ MICROALB/CREAT RATIO    2. Essential hypertension  -     METABOLIC PANEL, COMPREHENSIVE  - Bp at office today 142/78, 138/68 by manual arm cuff recheck. Pt continues with Irbesartan 75mg daily.   - Advised pt to keep an eye on her blood pressure at home.   - Recommended that pt lose some weight and decrease sodium in her diet. - Discussed with pt that if her Bp continues to be elevated, follow up with me and we can reevaluate her medication. 3. Hyperlipidemia with target LDL less than 689  -     METABOLIC PANEL, COMPREHENSIVE  -     LIPID PANEL  - Lipid panel on 09/20/17 notable for total cholesterol 150, LDL 77, HDL 66, and triglycerides 36. Pt continues with Atorvastatin 20mg daily. Presumed stable, will assess levels today. 4. Obesity, morbid ([address]  Assessment & Plan: This condition is managed by Specialist.  Key Obesity Meds             Liraglutide (VICTOZA 2-MAYLIN) 0.6 mg/0.1 mL (18 mg/3 mL) pnij  (Taking) 1.8 mg by SubCUTAneous route daily. metFORMIN ER (GLUCOPHAGE XR) 750 mg tablet  (Taking) TAKE 1 TAB BY MOUTH DAILY.         Lab Results   Component Value Date/Time    Glucose 104 09/20/2017 10:37 AM    Cholesterol, total 150 09/20/2017 10:37 AM    HDL Cholesterol 66 09/20/2017 10:37 AM    LDL, calculated 77 09/20/2017 10:37 AM    Triglyceride 36 09/20/2017 10:37 AM    Sodium 143 09/20/2017 10:37 AM Potassium 5.0 09/20/2017 10:37 AM    ALT (SGPT) 13 09/20/2017 10:37 AM    AST (SGOT) 17 09/20/2017 10:37 AM    VITAMIN D, 25-HYDROXY 21.0 09/20/2017 10:37 AM       Follow-up Disposition:  Return in about 6 months (around 9/21/2018) for hypertension, diabetes, cholesterol follow up. Medication risks/benefits/costs/interactions/alternatives discussed with patient. Advised patient to call back or return to office if symptoms worsen/change/persist.  If patient cannot reach us or should anything more severe/urgent arise he/she should proceed directly to the nearest emergency department. Discussed expected course/resolution/complications of diagnosis in detail with patient. Patient given a written after visit summary which includes her diagnoses, current medications and vitals. Patient expressed understanding with the diagnosis and plan. Written by valencia Elias, as dictated by Kaylee German M.D.   I have reviewed and agree with the above note and have made corrections where appropriate, Dr. Maria Alejandra Trejo MD

## 2018-03-21 NOTE — ASSESSMENT & PLAN NOTE
Stable, based on history, physical exam and review of pertinent labs, studies and medications; meds reconciled; continue current treatment plan. Key Antihyperglycemic Medications             pioglitazone (ACTOS) 30 mg tablet  (Taking) TAKE 1 TABLET BY MOUTH DAILY. Liraglutide (VICTOZA 2-MAYLIN) 0.6 mg/0.1 mL (18 mg/3 mL) pnij  (Taking) 1.8 mg by SubCUTAneous route daily. metFORMIN ER (GLUCOPHAGE XR) 750 mg tablet  (Taking) TAKE 1 TAB BY MOUTH DAILY. Other Key Diabetic Medications             irbesartan (AVAPRO) 75 mg tablet  (Taking) TAKE 1 TABLET BY MOUTH NIGHTLY.     atorvastatin (LIPITOR) 20 mg tablet  (Taking) TAKE ONE TABLET BY MOUTH DAILY        Lab Results   Component Value Date/Time    Hemoglobin A1c (POC) 6.3 03/21/2018 09:49 AM    Glucose 104 09/20/2017 10:37 AM    Creatinine 0.79 09/20/2017 10:37 AM    Microalb/Creat ratio (ug/mg creat.) <2.2 09/20/2017 10:36 AM    Cholesterol, total 150 09/20/2017 10:37 AM    HDL Cholesterol 66 09/20/2017 10:37 AM    LDL, calculated 77 09/20/2017 10:37 AM    Triglyceride 36 09/20/2017 10:37 AM     Diabetic Foot and Eye Exam HM Status   Topic Date Due    Eye Exam  06/27/2017    Diabetic Foot Care  09/20/2018

## 2018-03-22 LAB
ALBUMIN SERPL-MCNC: 4.8 G/DL (ref 3.5–5.5)
ALBUMIN/CREAT UR: 4 MG/G CREAT (ref 0–30)
ALBUMIN/GLOB SERPL: 1.8 {RATIO} (ref 1.2–2.2)
ALP SERPL-CCNC: 80 IU/L (ref 39–117)
ALT SERPL-CCNC: 15 IU/L (ref 0–32)
AST SERPL-CCNC: 20 IU/L (ref 0–40)
BILIRUB SERPL-MCNC: 0.4 MG/DL (ref 0–1.2)
BUN SERPL-MCNC: 15 MG/DL (ref 6–24)
BUN/CREAT SERPL: 20 (ref 9–23)
CALCIUM SERPL-MCNC: 9.6 MG/DL (ref 8.7–10.2)
CHLORIDE SERPL-SCNC: 104 MMOL/L (ref 96–106)
CHOLEST SERPL-MCNC: 148 MG/DL (ref 100–199)
CO2 SERPL-SCNC: 24 MMOL/L (ref 18–29)
CREAT SERPL-MCNC: 0.76 MG/DL (ref 0.57–1)
CREAT UR-MCNC: 144.3 MG/DL
EST. AVERAGE GLUCOSE BLD GHB EST-MCNC: 137 MG/DL
GFR SERPLBLD CREATININE-BSD FMLA CKD-EPI: 101 ML/MIN/1.73
GFR SERPLBLD CREATININE-BSD FMLA CKD-EPI: 87 ML/MIN/1.73
GLOBULIN SER CALC-MCNC: 2.6 G/DL (ref 1.5–4.5)
GLUCOSE SERPL-MCNC: 96 MG/DL (ref 65–99)
HBA1C MFR BLD: 6.4 % (ref 4.8–5.6)
HDLC SERPL-MCNC: 65 MG/DL
INTERPRETATION, 910389: NORMAL
LDLC SERPL CALC-MCNC: 76 MG/DL (ref 0–99)
MICROALBUMIN UR-MCNC: 5.7 UG/ML
POTASSIUM SERPL-SCNC: 4.3 MMOL/L (ref 3.5–5.2)
PROT SERPL-MCNC: 7.4 G/DL (ref 6–8.5)
SODIUM SERPL-SCNC: 144 MMOL/L (ref 134–144)
TRIGL SERPL-MCNC: 36 MG/DL (ref 0–149)
VLDLC SERPL CALC-MCNC: 7 MG/DL (ref 5–40)

## 2018-03-26 NOTE — PROGRESS NOTES
Inform pt to go to my chart to see results and recommendations    RECOMMENDATIONS:  None. Keep up the good work! Continue with current  diet and medications.   See you in 6 months

## 2018-03-29 DIAGNOSIS — E11.9 CONTROLLED TYPE 2 DIABETES MELLITUS WITHOUT COMPLICATION, WITHOUT LONG-TERM CURRENT USE OF INSULIN (HCC): ICD-10-CM

## 2018-03-29 NOTE — TELEPHONE ENCOUNTER
Pharmacy Is requesting a 90 day supply for the medication   Last refill : 17  Last seen by Dr Rossy Saucedo: 2018  . Requested Prescriptions     Pending Prescriptions Disp Refills    Liraglutide (VICTOZA 2-MAYLIN) 0.6 mg/0.1 mL (18 mg/3 mL) pnij 3 Adjustable Dose Pre-filled Pen Syringe 5     Si.8 mg by SubCUTAneous route daily.      The fax states Pearley Severance 3-Maylin 18mg/3ml pen    Pharmacy on file verified

## 2018-04-02 DIAGNOSIS — E11.9 CONTROLLED TYPE 2 DIABETES MELLITUS WITHOUT COMPLICATION, WITHOUT LONG-TERM CURRENT USE OF INSULIN (HCC): ICD-10-CM

## 2018-04-02 NOTE — TELEPHONE ENCOUNTER
Refill alternative 90 day    Requested Prescriptions     Pending Prescriptions Disp Refills    Liraglutide (VICTOZA 2-MAYLIN) 0.6 mg/0.1 mL (18 mg/3 mL) pnij 9 Pen 1     Si.8 mg by SubCUTAneous route daily.

## 2018-04-11 DIAGNOSIS — E11.9 CONTROLLED TYPE 2 DIABETES MELLITUS WITHOUT COMPLICATION, WITHOUT LONG-TERM CURRENT USE OF INSULIN (HCC): ICD-10-CM

## 2018-04-11 RX ORDER — METFORMIN HYDROCHLORIDE 750 MG/1
TABLET, EXTENDED RELEASE ORAL
Qty: 90 TAB | Refills: 1 | Status: SHIPPED | OUTPATIENT
Start: 2018-04-11 | End: 2018-10-08 | Stop reason: SDUPTHER

## 2018-05-14 DIAGNOSIS — E78.5 HYPERLIPIDEMIA WITH TARGET LDL LESS THAN 100: ICD-10-CM

## 2018-05-14 RX ORDER — ATORVASTATIN CALCIUM 20 MG/1
TABLET, FILM COATED ORAL
Qty: 90 TAB | Refills: 1 | Status: SHIPPED | OUTPATIENT
Start: 2018-05-14 | End: 2018-11-03 | Stop reason: SDUPTHER

## 2018-07-19 DIAGNOSIS — E11.9 CONTROLLED TYPE 2 DIABETES MELLITUS WITHOUT COMPLICATION, WITHOUT LONG-TERM CURRENT USE OF INSULIN (HCC): ICD-10-CM

## 2018-07-20 RX ORDER — PIOGLITAZONEHYDROCHLORIDE 30 MG/1
TABLET ORAL
Qty: 90 TAB | Refills: 1 | Status: SHIPPED | OUTPATIENT
Start: 2018-07-20 | End: 2019-01-03 | Stop reason: SDUPTHER

## 2018-09-07 DIAGNOSIS — I10 ESSENTIAL HYPERTENSION: ICD-10-CM

## 2018-09-07 RX ORDER — IRBESARTAN 75 MG/1
TABLET ORAL
Qty: 90 TAB | Refills: 1 | Status: SHIPPED | OUTPATIENT
Start: 2018-09-07 | End: 2019-04-10 | Stop reason: SDUPTHER

## 2018-09-20 DIAGNOSIS — E11.9 DIABETES MELLITUS TYPE 2, CONTROLLED (HCC): ICD-10-CM

## 2018-09-26 DIAGNOSIS — E11.9 CONTROLLED TYPE 2 DIABETES MELLITUS WITHOUT COMPLICATION, WITHOUT LONG-TERM CURRENT USE OF INSULIN (HCC): ICD-10-CM

## 2018-09-29 RX ORDER — LIRAGLUTIDE 6 MG/ML
INJECTION SUBCUTANEOUS
Qty: 27 PEN | Refills: 5 | Status: SHIPPED | OUTPATIENT
Start: 2018-09-29 | End: 2019-07-29 | Stop reason: SDUPTHER

## 2018-10-08 DIAGNOSIS — E11.9 CONTROLLED TYPE 2 DIABETES MELLITUS WITHOUT COMPLICATION, WITHOUT LONG-TERM CURRENT USE OF INSULIN (HCC): ICD-10-CM

## 2018-10-08 RX ORDER — METFORMIN HYDROCHLORIDE 750 MG/1
TABLET, EXTENDED RELEASE ORAL
Qty: 90 TAB | Refills: 1 | Status: SHIPPED | OUTPATIENT
Start: 2018-10-08 | End: 2019-04-02 | Stop reason: SDUPTHER

## 2018-10-11 DIAGNOSIS — E11.9 DIABETES MELLITUS TYPE 2, CONTROLLED (HCC): ICD-10-CM

## 2018-10-11 DIAGNOSIS — E11.9 TYPE 2 DIABETES MELLITUS WITHOUT COMPLICATION, WITH LONG-TERM CURRENT USE OF INSULIN (HCC): Primary | ICD-10-CM

## 2018-10-11 DIAGNOSIS — Z79.4 TYPE 2 DIABETES MELLITUS WITHOUT COMPLICATION, WITH LONG-TERM CURRENT USE OF INSULIN (HCC): Primary | ICD-10-CM

## 2018-10-12 ENCOUNTER — TELEPHONE (OUTPATIENT)
Dept: FAMILY MEDICINE CLINIC | Age: 58
End: 2018-10-12

## 2018-10-12 DIAGNOSIS — E78.49 OTHER HYPERLIPIDEMIA: ICD-10-CM

## 2018-10-12 DIAGNOSIS — E55.9 VITAMIN D DEFICIENCY: ICD-10-CM

## 2018-10-12 DIAGNOSIS — E11.9 DIABETES MELLITUS, STABLE (HCC): ICD-10-CM

## 2018-10-12 DIAGNOSIS — I15.8 OTHER SECONDARY HYPERTENSION: Primary | ICD-10-CM

## 2018-10-12 RX ORDER — PEN NEEDLE, DIABETIC 32 GX 1/4"
NEEDLE, DISPOSABLE MISCELLANEOUS
Qty: 100 PEN NEEDLE | Refills: 11 | Status: SHIPPED | OUTPATIENT
Start: 2018-10-12 | End: 2018-10-16 | Stop reason: RX

## 2018-10-12 NOTE — TELEPHONE ENCOUNTER
Per Dr Oren mirza to order cbc, cmp, lipid, a1c, vitamin d     [phone] attempted to call patient mailbox is full if patient called back just let her know that labs been ordered and she can come for her labs 1 week prior to her appointment

## 2018-10-12 NOTE — TELEPHONE ENCOUNTER
Pt is calling in regards to having lab orders placed in system prior to her rescheduled appt on Monday, November 26, 2018 03:00 PM she states that she would like to come in office week prior.      Best call back [phone]

## 2018-10-16 ENCOUNTER — TELEPHONE (OUTPATIENT)
Dept: FAMILY MEDICINE CLINIC | Age: 58
End: 2018-10-16

## 2018-10-16 RX ORDER — BLOOD SUGAR DIAGNOSTIC
STRIP MISCELLANEOUS
Status: CANCELLED | OUTPATIENT
Start: 2018-10-16

## 2018-10-17 DIAGNOSIS — Z79.4 CONTROLLED TYPE 2 DIABETES MELLITUS WITH COMPLICATION, WITH LONG-TERM CURRENT USE OF INSULIN (HCC): Primary | ICD-10-CM

## 2018-10-17 DIAGNOSIS — E11.8 CONTROLLED TYPE 2 DIABETES MELLITUS WITH COMPLICATION, WITH LONG-TERM CURRENT USE OF INSULIN (HCC): Primary | ICD-10-CM

## 2018-10-17 RX ORDER — BLOOD SUGAR DIAGNOSTIC
STRIP MISCELLANEOUS
Qty: 100 PEN NEEDLE | Refills: 3 | Status: SHIPPED | OUTPATIENT
Start: 2018-10-17 | End: 2020-01-21

## 2018-11-26 ENCOUNTER — OFFICE VISIT (OUTPATIENT)
Dept: FAMILY MEDICINE CLINIC | Age: 58
End: 2018-11-26

## 2018-11-26 VITALS
BODY MASS INDEX: 40.22 KG/M2 | DIASTOLIC BLOOD PRESSURE: 80 MMHG | WEIGHT: 227 LBS | OXYGEN SATURATION: 98 % | HEIGHT: 63 IN | SYSTOLIC BLOOD PRESSURE: 130 MMHG | HEART RATE: 78 BPM | TEMPERATURE: 98.1 F | RESPIRATION RATE: 16 BRPM

## 2018-11-26 DIAGNOSIS — Z23 ENCOUNTER FOR IMMUNIZATION: ICD-10-CM

## 2018-11-26 DIAGNOSIS — E78.5 HYPERLIPIDEMIA WITH TARGET LDL LESS THAN 100: ICD-10-CM

## 2018-11-26 DIAGNOSIS — I15.8 OTHER SECONDARY HYPERTENSION: ICD-10-CM

## 2018-11-26 DIAGNOSIS — E66.01 OBESITY, CLASS III, BMI 40-49.9 (MORBID OBESITY) (HCC): ICD-10-CM

## 2018-11-26 DIAGNOSIS — E11.9 TYPE 2 DIABETES MELLITUS WITHOUT COMPLICATION, WITHOUT LONG-TERM CURRENT USE OF INSULIN (HCC): Primary | ICD-10-CM

## 2018-11-26 DIAGNOSIS — Z23 NEED FOR SHINGLES VACCINE: ICD-10-CM

## 2018-11-26 LAB — HBA1C MFR BLD HPLC: 6 %

## 2018-11-26 NOTE — PROGRESS NOTES
HISTORY OF PRESENT ILLNESS Cathi Ann is a 62 y.o. female who presents today for diabetes 6 month follow up. Blood pressure 130/80, pulse 78, temperature 98.1 °F (36.7 °C), temperature source Oral, resp. rate 16, height 5' 3\" (1.6 m), weight 227 lb (103 kg), last menstrual period 06/30/2013, SpO2 98 %. Body mass index is 40.21 kg/m². Chief Complaint Patient presents with  Diabetes 6 month f/u HPI 
DM2: A1c per POC today 6.0%, decreased from A1c of 6.4% on 03/21/2018. Pt continues with Glucophage 750 mg, Victoza 1.8 mg daily, and Actos 30 mg daily. Encouraged pt to continue the good work but to work on losing weight. Hypertension: Bp at office today 148/72. 130/80 on manual arm cuff recheck. Pt continues with avapro 75 mg daily. Health Maintenance: Pt is due for her pap smear with Dr. Kenneth Beal. Recommended that pt receive Shingrix vaccination. Advised that pt will need a second dose 2 months after first. Advised that the vaccine is on backorder but that their pharmacy should let them know when it becomes available. Pt received flu shot in office today. Pt will return in 6 months for office visit and labs. Current Outpatient Medications Medication Sig Dispense Refill  varicella-zoster recombinant, PF, (SHINGRIX, PF,) 50 mcg/0.5 mL susr injection 0.5 mL by IntraMUSCular route once for 1 dose. Give second dose 2 months after first 0.5 mL 1  
 atorvastatin (LIPITOR) 20 mg tablet TAKE ONE TABLET BY MOUTH DAILY 90 Tab 0  
 Insulin Needles, Disposable, (BD ULTRA-FINE MICRO PEN NEEDLE) 32 gauge x 1/4\" ndle Use as directed once daily 100 Pen Needle 3  
 metFORMIN ER (GLUCOPHAGE XR) 750 mg tablet TAKE 1 TABLET BY MOUTH DAILY. 90 Tab 1  VICTOZA 3-MAYLIN 0.6 mg/0.1 mL (18 mg/3 mL) pnij INJECT 1.8 MG BY SUBCUTANEOUS ROUTE DAILY. 27 Pen 5  
 ONETOUCH ULTRA BLUE TEST STRIP strip USE DAILY AS DIRECTED 100 Strip 1  irbesartan (AVAPRO) 75 mg tablet TAKE 1 TABLET BY MOUTH NIGHTLY. 90 Tab 1  pioglitazone (ACTOS) 30 mg tablet TAKE 1 TABLET BY MOUTH DAILY. 90 Tab 1  
 Insulin Syringe-Needle U-100 (INSULIN SYRINGE ULTRAFINE) 0.5 mL 29 gauge x 1/2\" syrg Use as directed once every day 100 Syringe 5  Liraglutide (VICTOZA 2-MAYLIN) 0.6 mg/0.1 mL (18 mg/3 mL) pnij 1.8 mg by SubCUTAneous route daily. 3 Pen 4 Allergies Allergen Reactions  Pcn [Penicillins] Rash Past Medical History:  
Diagnosis Date  Diabetes ([facility] [address]  Hypercholesterolemia  Hypertension History reviewed. No pertinent surgical history. Family History Problem Relation Age of Onset  Diabetes Mother  Diabetes Father  Diabetes Maternal Aunt  Diabetes Maternal Grandmother  Diabetes Maternal Grandfather  Diabetes Paternal Grandmother  Diabetes Paternal Grandfather Social History Tobacco Use  Smoking status: Never Smoker  Smokeless tobacco: Never Used Substance Use Topics  Alcohol use: No  
  
 
Review of Systems Constitutional: Negative. Negative for malaise/fatigue. Eyes: Negative for blurred vision. Respiratory: Negative for shortness of breath. Cardiovascular: Negative for chest pain and leg swelling. Musculoskeletal: Negative. Neurological: Negative. Negative for dizziness and headaches. All other systems reviewed and are negative. Physical Exam  
Constitutional: She is oriented to person, place, and time. She appears well-developed and well-nourished. No distress. HENT:  
Head: Normocephalic and atraumatic. Neck: Carotid bruit is not present. Cardiovascular: Normal rate, regular rhythm, normal heart sounds and intact distal pulses. Exam reveals no gallop and no friction rub. No murmur heard. Pulmonary/Chest: Effort normal and breath sounds normal. No respiratory distress. She has no wheezes. She has no rales. Musculoskeletal: She exhibits no edema. Neurological: She is alert and oriented to person, place, and time. Skin: She is not diaphoretic. Psychiatric: She has a normal mood and affect. Her behavior is normal. Judgment and thought content normal.  
Nursing note and vitals reviewed. ASSESSMENT and PLAN Diagnoses and all orders for this visit: 
 
1. Type 2 diabetes mellitus without complication, without long-term current use of insulin ([facility] [address] -     AMB POC HEMOGLOBIN A1C 
-     At goal. Advised to continue current regimen 2. Other secondary hypertension -     At goal. Advised to continue current regimen 3. Hyperlipidemia with target LDL less than 100 -     When last checked cholesterol was at goal. Will reassess in June 4. Encounter for immunization 
-     INFLUENZA VIRUS VAC QUAD,SPLIT,PRESV FREE SYRINGE IM 
-     NC IMMUNIZ ADMIN,1 SINGLE/COMB VAC/TOXOID 5. Need for shingles vaccine 
-     varicella-zoster recombinant, PF, (SHINGRIX, PF,) 50 mcg/0.5 mL susr injection; 0.5 mL by IntraMUSCular route once for 1 dose. Give second dose 2 months after first 
 
6. Obesity, Class III, BMI 40-49.9 (morbid obesity) ([facility] [address] -     I have reviewed/discussed the above normal BMI with the patient. I have recommended the following interventions: dietary management education, guidance, and counseling, encourage exercise and monitor weight . Follow-up Disposition: 
Return in about 6 months (around 5/26/2019) for hypertension, diabetes, cholesterol follow up. Medication risks/benefits/costs/interactions/alternatives discussed with patient. Advised patient to call back or return to office if symptoms worsen/change/persist.  If patient cannot reach us or should anything more severe/urgent arise he/she should proceed directly to the nearest emergency department. Discussed expected course/resolution/complications of diagnosis in detail with patient.  
Patient given a written after visit summary which includes their diagnoses, current medications and vitals. Patient expressed understanding with the diagnosis and plan. Written by valencia Acevedo, as dictated by Luc Santacruz M.D. 
4:06 PM - 4:18 PM 
 
Total time spent with the patient 12 minutes, greater than 50% of time spent counseling patient.

## 2018-11-26 NOTE — PROGRESS NOTES
Chief Complaint Patient presents with  Diabetes 6 month f/u Reviewed Record in preparation for visit and have obtained necessary documentation. Identified pt with two pt identifiers (Name @ ) Health Maintenance Due Topic  Shingrix Vaccine Age 50> (1 of 2)  PAP AKA CERVICAL CYTOLOGY  Influenza Age 5 to Adult  HEMOGLOBIN A1C Q6M   
 
 
 
1. Have you been to the ER, urgent care clinic since your last visit? Hospitalized since your last visit? no 
 
2. Have you seen or consulted any other health care providers outside of the [address] [place] [address] since your last visit? Include any pap smears or colon screening.  no

## 2018-11-26 NOTE — PATIENT INSTRUCTIONS
Body Mass Index: Care Instructions Your Care Instructions Body mass index (BMI) can help you see if your weight is raising your risk for health problems. It uses a formula to compare how much you weigh with how tall you are. · A BMI lower than 18.5 is considered underweight. · A BMI between 18.5 and 24.9 is considered healthy. · A BMI between 25 and 29.9 is considered overweight. A BMI of 30 or higher is considered obese. If your BMI is in the normal range, it means that you have a lower risk for weight-related health problems. If your BMI is in the overweight or obese range, you may be at increased risk for weight-related health problems, such as high blood pressure, heart disease, stroke, arthritis or joint pain, and diabetes. If your BMI is in the underweight range, you may be at increased risk for health problems such as fatigue, lower protection (immunity) against illness, muscle loss, bone loss, hair loss, and hormone problems. BMI is just one measure of your risk for weight-related health problems. You may be at higher risk for health problems if you are not active, you eat an unhealthy diet, or you drink too much alcohol or use tobacco products. Follow-up care is a key part of your treatment and safety. Be sure to make and go to all appointments, and call your doctor if you are having problems. It's also a good idea to know your test results and keep a list of the medicines you take. How can you care for yourself at home? · Practice healthy eating habits. This includes eating plenty of fruits, vegetables, whole grains, lean protein, and low-fat dairy. · If your doctor recommends it, get more exercise. Walking is a good choice. Bit by bit, increase the amount you walk every day. Try for at least 30 minutes on most days of the week. · Do not smoke. Smoking can increase your risk for health problems.  If you need help quitting, talk to your doctor about stop-smoking programs and medicines. These can increase your chances of quitting for good. · Limit alcohol to 2 drinks a day for men and 1 drink a day for women. Too much alcohol can cause health problems. If you have a BMI higher than 25 · Your doctor may do other tests to check your risk for weight-related health problems. This may include measuring the distance around your waist. A waist measurement of more than 40 inches in men or 35 inches in women can increase the risk of weight-related health problems. · Talk with your doctor about steps you can take to stay healthy or improve your health. You may need to make lifestyle changes to lose weight and stay healthy, such as changing your diet and getting regular exercise. If you have a BMI lower than 18.5 · Your doctor may do other tests to check your risk for health problems. · Talk with your doctor about steps you can take to stay healthy or improve your health. You may need to make lifestyle changes to gain or maintain weight and stay healthy, such as getting more healthy foods in your diet and doing exercises to build muscle. Where can you learn more? Go to http://nahomy-najma.info/. Enter S176 in the search box to learn more about \"Body Mass Index: Care Instructions. \" Current as of: October 13, 2016 Content Version: 11.4 © 2955-1824 [employer]. Care instructions adapted under license by [employer] (which disclaims liability or warranty for this information). If you have questions about a medical condition or this instruction, always ask your healthcare professional. [address] any warranty or liability for your use of this information.

## 2018-12-04 DIAGNOSIS — E11.9 DIABETES MELLITUS TYPE 2, CONTROLLED (HCC): ICD-10-CM

## 2019-01-03 DIAGNOSIS — E11.9 CONTROLLED TYPE 2 DIABETES MELLITUS WITHOUT COMPLICATION, WITHOUT LONG-TERM CURRENT USE OF INSULIN (HCC): ICD-10-CM

## 2019-01-04 RX ORDER — PIOGLITAZONEHYDROCHLORIDE 30 MG/1
TABLET ORAL
Qty: 90 TAB | Refills: 1 | Status: SHIPPED | OUTPATIENT
Start: 2019-01-04 | End: 2019-07-21 | Stop reason: SDUPTHER

## 2019-02-01 NOTE — TELEPHONE ENCOUNTER
Received PA request for Once Touch Ultra Blue Test strips. Insurance compnay states that is not covered. accuchek glucometer and strips are covered.      Left message to call back office to inform patient of this

## 2019-02-02 DIAGNOSIS — E78.5 HYPERLIPIDEMIA WITH TARGET LDL LESS THAN 100: ICD-10-CM

## 2019-02-02 RX ORDER — ATORVASTATIN CALCIUM 20 MG/1
TABLET, FILM COATED ORAL
Qty: 90 TAB | Refills: 0 | Status: SHIPPED | OUTPATIENT
Start: 2019-02-02 | End: 2019-05-02 | Stop reason: SDUPTHER

## 2019-02-04 ENCOUNTER — OFFICE VISIT (OUTPATIENT)
Dept: FAMILY MEDICINE CLINIC | Age: 59
End: 2019-02-04

## 2019-02-04 VITALS
RESPIRATION RATE: 16 BRPM | SYSTOLIC BLOOD PRESSURE: 153 MMHG | BODY MASS INDEX: 39.87 KG/M2 | DIASTOLIC BLOOD PRESSURE: 59 MMHG | HEART RATE: 76 BPM | TEMPERATURE: 98 F | HEIGHT: 63 IN | OXYGEN SATURATION: 100 % | WEIGHT: 225 LBS

## 2019-02-04 DIAGNOSIS — I10 ESSENTIAL HYPERTENSION: Primary | ICD-10-CM

## 2019-02-04 DIAGNOSIS — L30.9 DERMATITIS: ICD-10-CM

## 2019-02-04 DIAGNOSIS — L29.9 ITCHING OF EAR: ICD-10-CM

## 2019-02-04 RX ORDER — CLOTRIMAZOLE AND BETAMETHASONE DIPROPIONATE 10; .64 MG/G; MG/G
0.5 CREAM TOPICAL 2 TIMES DAILY
Qty: 15 G | Refills: 0 | Status: SHIPPED | OUTPATIENT
Start: 2019-02-04 | End: 2019-02-11

## 2019-02-04 RX ORDER — BLOOD-GLUCOSE METER
EACH MISCELLANEOUS
Qty: 1 EACH | Refills: 0 | Status: SHIPPED | OUTPATIENT
Start: 2019-02-04

## 2019-02-04 NOTE — PROGRESS NOTES
Assessment/Plan:     Diagnoses and all orders for this visit:    1. Essential hypertension  Not to goal. Continue to monitor blood pressures for a goal of 140/90 or less. 2. Dermatitis  -     clotrimazole-betamethasone (LOTRISONE) topical cream; Apply 0.5 g to affected area two (2) times a day for 7 days. Treatment as above. Follow up if no improvement. 3. Itching of ear  Discussed otc treatment. Follow up if no improvement. Consider addition of oral antihistamine at that time. Follow-up Disposition:  Return if symptoms worsen or fail to improve. Discussed expected course/resolution/complications of diagnosis in detail with patient.    Medication risks/benefits/costs/interactions/alternatives discussed with patient.    Pt was given after visit summary which includes diagnoses, current medications & vitals. Pt expressed understanding with the diagnosis and plan          Subjective:      Donna Shaver is a 62 y.o. female who presents for had concerns including Ear Pain (itchy and drainage both ears ). Reports a several day history of itching ears and ear drainage. Has attempted otc treatment without improvement. Was recently on antibiotics for acute URI treated by [facility] with Doxycycline. Current Outpatient Medications   Medication Sig Dispense Refill    glucose blood VI test strips (ACCU-CHEK OSCAR PLUS TEST STRP) strip Check blood sugar daily DX:E11.9 50 Strip 5    Blood-Glucose Meter (ACCU-CHEK OSCAR PLUS METER) misc Check blood sugar daily DX:E11.9 1 Each 0    clotrimazole-betamethasone (LOTRISONE) topical cream Apply 0.5 g to affected area two (2) times a day for 7 days.  15 g 0    atorvastatin (LIPITOR) 20 mg tablet TAKE 1 TABLET BY MOUTH EVERY DAY 90 Tab 0    pioglitazone (ACTOS) 30 mg tablet TAKE 1 TABLET BY MOUTH EVERY DAY 90 Tab 1    ONETOUCH ULTRA BLUE TEST STRIP strip USE DAILY AS DIRECTED 100 Strip 1    Insulin Needles, Disposable, (BD ULTRA-FINE MICRO PEN NEEDLE) 32 gauge x 1/4\" ndle Use as directed once daily 100 Pen Needle 3    metFORMIN ER (GLUCOPHAGE XR) 750 mg tablet TAKE 1 TABLET BY MOUTH DAILY. 90 Tab 1    VICTOZA 3-MAYLIN 0.6 mg/0.1 mL (18 mg/3 mL) pnij INJECT 1.8 MG BY SUBCUTANEOUS ROUTE DAILY. 27 Pen 5    irbesartan (AVAPRO) 75 mg tablet TAKE 1 TABLET BY MOUTH NIGHTLY. 90 Tab 1    Liraglutide (VICTOZA 2-MAYLIN) 0.6 mg/0.1 mL (18 mg/3 mL) pnij 1.8 mg by SubCUTAneous route daily. 3 Pen 4       Allergies   Allergen Reactions    Pcn [Penicillins] Rash       ROS:   Review of Systems   Constitutional: Negative for chills, fever and malaise/fatigue. HENT: Positive for ear discharge and ear pain. Negative for congestion, sinus pain and sore throat. Respiratory: Negative for cough, sputum production, shortness of breath and wheezing. Cardiovascular: Negative for chest pain. Neurological: Negative for seizures. Endo/Heme/Allergies: Negative for environmental allergies. Objective:     Visit Vitals  /59   Pulse 76   Temp 98 °F (36.7 °C) (Oral)   Resp 16   Ht 5' 3\" (1.6 m)   Wt 225 lb (102.1 kg)   LMP 06/30/2013   SpO2 100%   BMI 39.86 kg/m²       Vitals and Nurse Documentation reviewed. Physical Exam   Constitutional: No distress. HENT:   Head:       Right Ear: Tympanic membrane is not erythematous and not bulging. No middle ear effusion. Left Ear: Tympanic membrane is not erythematous and not bulging. No middle ear effusion. Nose: No rhinorrhea. Right sinus exhibits no maxillary sinus tenderness and no frontal sinus tenderness. Left sinus exhibits no maxillary sinus tenderness and no frontal sinus tenderness. Mouth/Throat: No oropharyngeal exudate or posterior oropharyngeal erythema. Right ear is: 100% occluded with wet cerumen. Cleared with lavage today and canal is unremarkable. Rash consistent with seborrheic dermatitis.     Eyes: EOM and lids are normal.   Cardiovascular: S1 normal and S2 normal. Exam reveals no gallop and no friction rub. No murmur heard. Pulmonary/Chest: Breath sounds normal. She has no wheezes. Lymphadenopathy:     She has no cervical adenopathy. Skin: Skin is warm and dry.    Psychiatric: Mood and affect normal.

## 2019-02-04 NOTE — PATIENT INSTRUCTIONS

## 2019-02-05 ENCOUNTER — TELEPHONE (OUTPATIENT)
Dept: FAMILY MEDICINE CLINIC | Age: 59
End: 2019-02-05

## 2019-02-05 NOTE — TELEPHONE ENCOUNTER
[phone] attempted to call patient no answer left message to call me back in regards to her sleep study letter and Dr. Hudson Mishra need more information

## 2019-02-05 NOTE — TELEPHONE ENCOUNTER
----- Message from Reji Renteria NP sent at 2/4/2019 12:19 PM EST -----  Please fax to [phone]    Can you please fax a letter to her life insurance carrier on why does she not need sleep study. They are asking what the cause of the snoring was and why was the sleep study no longer needed. [facility]!

## 2019-03-18 NOTE — TELEPHONE ENCOUNTER
LOV 3/21/2018  Has appointment 10/22/2018  Last refill 4/2018  Last labs 3/2018 Pt calls back and is given information below.  Pt given number for central scheduling and will call this office back to schedule a follow up once she has a date for the ct scan.

## 2019-04-02 DIAGNOSIS — E11.9 CONTROLLED TYPE 2 DIABETES MELLITUS WITHOUT COMPLICATION, WITHOUT LONG-TERM CURRENT USE OF INSULIN (HCC): ICD-10-CM

## 2019-04-02 RX ORDER — METFORMIN HYDROCHLORIDE 750 MG/1
TABLET, EXTENDED RELEASE ORAL
Qty: 90 TAB | Refills: 1 | Status: SHIPPED | OUTPATIENT
Start: 2019-04-02 | End: 2019-09-29 | Stop reason: SDUPTHER

## 2019-04-02 NOTE — TELEPHONE ENCOUNTER
Pharmacy requesting medication refill    Requested Prescriptions     Pending Prescriptions Disp Refills    metFORMIN ER (GLUCOPHAGE XR) 750 mg tablet 90 Tab 1     Pharmacy on file verified  [phone])    Pt to be seen in office on Wednesday, May 22, 2019 08:15 AM

## 2019-04-10 DIAGNOSIS — I10 ESSENTIAL HYPERTENSION: ICD-10-CM

## 2019-04-10 RX ORDER — IRBESARTAN 75 MG/1
TABLET ORAL
Qty: 90 TAB | Refills: 1 | Status: SHIPPED | OUTPATIENT
Start: 2019-04-10 | End: 2019-08-14 | Stop reason: DRUGHIGH

## 2019-05-02 DIAGNOSIS — E78.5 HYPERLIPIDEMIA WITH TARGET LDL LESS THAN 100: ICD-10-CM

## 2019-05-02 RX ORDER — ATORVASTATIN CALCIUM 20 MG/1
TABLET, FILM COATED ORAL
Qty: 90 TAB | Refills: 0 | Status: SHIPPED | OUTPATIENT
Start: 2019-05-02 | End: 2019-08-04 | Stop reason: SDUPTHER

## 2019-07-21 DIAGNOSIS — E11.9 CONTROLLED TYPE 2 DIABETES MELLITUS WITHOUT COMPLICATION, WITHOUT LONG-TERM CURRENT USE OF INSULIN (HCC): ICD-10-CM

## 2019-07-21 RX ORDER — PIOGLITAZONEHYDROCHLORIDE 30 MG/1
TABLET ORAL
Qty: 90 TAB | Refills: 0 | Status: SHIPPED | OUTPATIENT
Start: 2019-07-21 | End: 2019-10-18 | Stop reason: SDUPTHER

## 2019-07-22 NOTE — TELEPHONE ENCOUNTER
[phone] Notified patient via  we have received prescription refill from pharmacy and refill was sent to pharmacy.  However she/he is due for follow up and he/she needs to call us to make appointment for follow up for future medication refill Please call our office at [phone] to schedule appointment

## 2019-07-29 ENCOUNTER — OFFICE VISIT (OUTPATIENT)
Dept: FAMILY MEDICINE CLINIC | Age: 59
End: 2019-07-29

## 2019-07-29 VITALS
BODY MASS INDEX: 40.36 KG/M2 | RESPIRATION RATE: 18 BRPM | SYSTOLIC BLOOD PRESSURE: 150 MMHG | DIASTOLIC BLOOD PRESSURE: 80 MMHG | WEIGHT: 227.8 LBS | TEMPERATURE: 98.7 F | HEIGHT: 63 IN | HEART RATE: 80 BPM

## 2019-07-29 DIAGNOSIS — E11.9 CONTROLLED TYPE 2 DIABETES MELLITUS WITHOUT COMPLICATION, WITHOUT LONG-TERM CURRENT USE OF INSULIN (HCC): ICD-10-CM

## 2019-07-29 DIAGNOSIS — I10 ESSENTIAL HYPERTENSION: ICD-10-CM

## 2019-07-29 DIAGNOSIS — E66.01 OBESITY, CLASS III, BMI 40-49.9 (MORBID OBESITY) (HCC): ICD-10-CM

## 2019-07-29 DIAGNOSIS — E55.9 VITAMIN D DEFICIENCY: ICD-10-CM

## 2019-07-29 DIAGNOSIS — E78.5 HYPERLIPIDEMIA WITH TARGET LDL LESS THAN 100: Primary | ICD-10-CM

## 2019-07-29 DIAGNOSIS — R60.9 EDEMA, UNSPECIFIED TYPE: ICD-10-CM

## 2019-07-29 DIAGNOSIS — G47.9 SLEEP DISTURBANCE: ICD-10-CM

## 2019-07-29 LAB — HBA1C MFR BLD HPLC: 7 %

## 2019-07-29 NOTE — PROGRESS NOTES
Chief Complaint   Patient presents with    Diabetes     f/u        Reviewed Record in preparation for visit and have obtained necessary documentation. Identified pt with two pt identifiers (Name @ )    Health Maintenance Due   Topic    PAP AKA CERVICAL CYTOLOGY     FOOT EXAM Q1     MICROALBUMIN Q1     LIPID PANEL Q1     HEMOGLOBIN A1C Q6M          1. Have you been to the ER, urgent care clinic since your last visit? Hospitalized since your last visit? no      2. Have you seen or consulted any other health care providers outside of the [address] [place] [address] since your last visit? Include any pap smears or colon screening.   no

## 2019-07-29 NOTE — PATIENT INSTRUCTIONS
Counting Carbohydrates: Care Instructions  Your Care Instructions    You don't have to eat special foods when you have diabetes. You just have to be careful to eat healthy foods. Carbohydrates (carbs) raise blood sugar higher and quicker than any other nutrient. Carbs are found in desserts, breads and cereals, and fruit. They're also in starchy vegetables. These include potatoes, corn, and grains such as rice and pasta. Carbs are also in milk and yogurt. The more carbs you eat at one time, the higher your blood sugar will rise. Spreading carbs all through the day helps keep your blood sugar levels within your target range. Counting carbs is one of the best ways to keep your blood sugar under control. If you use insulin, counting carbs helps you match the right amount of insulin to the number of grams of carbs in a meal. Then you can change your diet and insulin dose as needed. Testing your blood sugar several times a day can help you learn how carbs affect your blood sugar. A registered dietitian or certified diabetes educator can help you plan meals and snacks. Follow-up care is a key part of your treatment and safety. Be sure to make and go to all appointments, and call your doctor if you are having problems. It's also a good idea to know your test results and keep a list of the medicines you take. How can you care for yourself at home? Know your daily amount of carbohydrates  Your daily amount depends on several things, such as your weight, how active you are, which diabetes medicines you take, and what your goals are for your blood sugar levels. A registered dietitian or certified diabetes educator can help you plan how many carbs to include in each meal and snack. For most adults, a guideline for the daily amount of carbs is:  · 45 to 60 grams at each meal. That's about the same as 3 to 4 carbohydrate servings. · 15 to 20 grams at each snack. That's about the same as 1 carbohydrate serving.   Count carbs  Counting carbs lets you know how much rapid-acting insulin to take before you eat. If you use an insulin pump, you get a constant rate of insulin during the day. So the pump must be programmed at meals. This gives you extra insulin to cover the rise in blood sugar after meals. If you take insulin:  · Learn your own insulin-to-carb ratio. You and your diabetes health professional will figure out the ratio. You can do this by testing your blood sugar after meals. For example, you may need a certain amount of insulin for every 15 grams of carbs. · Add up the carb grams in a meal. Then you can figure out how many units of insulin to take based on your insulin-to-carb ratio. · Exercise lowers blood sugar. You can use less insulin than you would if you were not doing exercise. Keep in mind that timing matters. If you exercise within 1 hour after a meal, your body may need less insulin for that meal than it would if you exercised 3 hours after the meal. Test your blood sugar to find out how exercise affects your need for insulin. If you do or don't take insulin:  · Look at labels on packaged foods. This can tell you how many carbs are in a serving. You can also use guides from the American Diabetes Association. · Be aware of portions, or serving sizes. If a package has two servings and you eat the whole package, you need to double the number of grams of carbohydrate listed for one serving. · Protein, fat, and fiber do not raise blood sugar as much as carbs do. If you eat a lot of these nutrients in a meal, your blood sugar will rise more slowly than it would otherwise. Eat from all food groups  · Eat at least three meals a day. · Plan meals to include food from all the food groups. The food groups include grains, fruits, dairy, proteins, and vegetables. · Talk to your dietitian or diabetes educator about ways to add limited amounts of sweets into your meal plan. · If you drink alcohol, talk to your doctor. It may not be recommended when you are taking certain diabetes medicines. Where can you learn more? Go to http://nahomy-najma.info/. Enter L533 in the search box to learn more about \"Counting Carbohydrates: Care Instructions. \"  Current as of: July 25, 2018  Content Version: 12.1  © 1088-9804 [employer]. Care instructions adapted under license by [employer] (which disclaims liability or warranty for this information). If you have questions about a medical condition or this instruction, always ask your healthcare professional. [address] any warranty or liability for your use of this information.

## 2019-07-29 NOTE — PROGRESS NOTES
VIKASH Monson 61 y.o. female  presents to the office today for follow up on DM. Blood pressure 150/80, pulse 80, temperature 98.7 °F (37.1 °C), resp. rate 18, height 5' 3\" (1.6 m), weight 227 lb 12.8 oz (103.3 kg), last menstrual period 06/30/2013. Body mass index is 40.35 kg/m². Chief Complaint   Patient presents with    Diabetes     f/u , fasting today        DM2: A1c per POC today 7.0, increased from A1c of 6.4 on 3/21/18. Pt continues with Actos 30 mg/d, Metformin  mg/d and insulin injections. Hyperlipidemia: Lipid panel on 3/21/18 notable for total cholesterol 148, HDL 65, LDL 76, and triglycerides 36. Pt continues with Atorvastatin 20 mg/d. Presumed stable, will assess levels today. Obesity: I have reviewed/discussed the above normal BMI with the patient. Hypertension: /80 on manual recheck. Pt is currently taking Irbesartan 75 mg/d, but admits that she did not take her BP medication this morning. I discussed with pt that it is important that she is complaint to her medication regimen and check her BP regularly at home in order to keep her BP under control, due to her FHx of heart disease. Pt will follow up with me in 2 weeks to reassess BP. If BP does not improve then, we might need to discuss adding diuretics to her medication regimen. Vitamin D deficiency: Pt's vitamin D levels were 21 on 9/20/17. Presumed stable, will assess levels today. Sleep disturbance: Pt states that she has been experiencing difficulties sleeping, and pt thinks that it could stress due to work as well as menopause. Pt tried OTC Melatonin, but pt did not notice any improvements. I advised pt to focus on sleep hygiene and continue taking Melatonin to improve sleep qualities. Edema: Pt reports experiencing swelling of left foot. I discussed with pt that she could be retaining fluid, and that she should reduce sodium intake.       Health Maintenance: Pt followed up with GYN Dr. Jadon Waddell, and pt was informed that she was not due for mammogram.       Current Outpatient Medications   Medication Sig Dispense Refill    pioglitazone (ACTOS) 30 mg tablet TAKE 1 TABLET BY MOUTH EVERY DAY 90 Tab 0    atorvastatin (LIPITOR) 20 mg tablet TAKE 1 TABLET BY MOUTH EVERY DAY 90 Tab 0    irbesartan (AVAPRO) 75 mg tablet TAKE 1 TABLET BY MOUTH NIGHTLY. 90 Tab 1    metFORMIN ER (GLUCOPHAGE XR) 750 mg tablet TAKE 1 TABLET BY MOUTH DAILY. 90 Tab 1    glucose blood VI test strips (ACCU-CHEK OSCAR PLUS TEST STRP) strip Check blood sugar daily DX:E11.9 50 Strip 5    Blood-Glucose Meter (ACCU-CHEK OSCAR PLUS METER) misc Check blood sugar daily DX:E11.9 1 Each 0    ONETOUCH ULTRA BLUE TEST STRIP strip USE DAILY AS DIRECTED 100 Strip 1    Insulin Needles, Disposable, (BD ULTRA-FINE MICRO PEN NEEDLE) 32 gauge x 1/4\" ndle Use as directed once daily 100 Pen Needle 3    Liraglutide (VICTOZA 2-MAYLIN) 0.6 mg/0.1 mL (18 mg/3 mL) pnij 1.8 mg by SubCUTAneous route daily. 3 Pen 4     Allergies   Allergen Reactions    Pcn [Penicillins] Rash     Past Medical History:   Diagnosis Date    Diabetes ([facility] [address]     Hypercholesterolemia     Hypertension      History reviewed. No pertinent surgical history. Family History   Problem Relation Age of Onset    Diabetes Mother     Diabetes Father     Diabetes Maternal Aunt     Diabetes Maternal Grandmother     Diabetes Maternal Grandfather     Diabetes Paternal Grandmother     Diabetes Paternal Grandfather      Social History     Tobacco Use    Smoking status: Never Smoker    Smokeless tobacco: Never Used   Substance Use Topics    Alcohol use: No        Review of Systems   Constitutional: Negative for chills and fever. HENT: Negative for hearing loss and tinnitus. Eyes: Negative for blurred vision and double vision. Respiratory: Negative for cough and shortness of breath. Cardiovascular: Negative for chest pain and palpitations.    Gastrointestinal: Negative for nausea and vomiting. Genitourinary: Negative for dysuria and frequency. Musculoskeletal: Negative for back pain and falls. Skin: Negative for itching and rash. Neurological: Negative for dizziness, loss of consciousness and headaches. Psychiatric/Behavioral: Negative for depression. The patient is not nervous/anxious. Physical Exam   Constitutional: She is oriented to person, place, and time. She appears well-developed and well-nourished. HENT:   Head: Normocephalic and atraumatic. Right Ear: External ear normal.   Left Ear: External ear normal.   Nose: Nose normal.   Mouth/Throat: Oropharynx is clear and moist.   Eyes: Conjunctivae and EOM are normal.   Neck: Normal range of motion. Neck supple. Cardiovascular: Normal rate, regular rhythm, normal heart sounds and intact distal pulses. Pulmonary/Chest: Effort normal and breath sounds normal.   Abdominal: Soft. Bowel sounds are normal.   Musculoskeletal: Normal range of motion. Right foot: There is swelling. Neurological: She is alert and oriented to person, place, and time. Skin: Skin is warm and dry. Psychiatric: She has a normal mood and affect. Her behavior is normal. Judgment and thought content normal.   Nursing note and vitals reviewed. Diabetic foot exam:     Left Foot:   Visual Exam: normal    Pulse DP: 2+ (normal)   Filament test: normal sensation       Right Foot:   Visual Exam: normal    Pulse DP: 2+ (normal)   Filament test: normal sensation       ASSESSMENT and PLAN  Diagnoses and all orders for this visit:    1. Hyperlipidemia with target LDL less than 100  Presumed stable, will assess levels today. Advised pt to continue with Atorvastatin 20 mg/d.   -     LIPID PANEL  -     METABOLIC PANEL, COMPREHENSIVE    2. Controlled type 2 diabetes mellitus without complication, without long-term current use of insulin ([facility])  A1c today 7.0. Advised pt to continue with Actos 30 mg/d, Metformin  mg/d and insulin injections.    - AMB POC HEMOGLOBIN A1C  -     MICROALBUMIN, UR, RAND W/ MICROALB/CREAT RATIO  -     HM DIABETES FOOT EXAM    3. Essential hypertension  BP is not at goal today in office. Advised pt to continue with Irbesartan 75 mg/d. Advised pt to comply with her medication regimen and check her BP regularly at home. Will follow up in 2 weeks. 4. Obesity, Class III, BMI 40-49.9 (morbid obesity) ([facility] [address]  I have reviewed/discussed the above normal BMI with the patient. I have recommended the following interventions: dietary management education, guidance, and counseling, encourage exercise and monitor weight . 5. Vitamin D deficiency  Presumed stable, will assess levels today. -     VITAMIN D, 25 HYDROXY    6. Sleep disturbance  Advised pt to focus on sleep hygiene and continue taking Melatonin. 7. Edema, unspecified type  Advised pt to reduce sodium intake. Follow-up and Dispositions    · Return in about 2 weeks (around 8/12/2019) for hypertension follow up. Medication risks/benefits/costs/interactions/alternatives discussed with patient. Advised patient to call back or return to office if symptoms worsen/change/persist.  If patient cannot reach us or should anything more severe/urgent arise he/she should proceed directly to the nearest emergency department. Discussed expected course/resolution/complications of diagnosis in detail with patient. Patient given a written after visit summary which includes her diagnoses, current medications and vitals. Patient expressed understanding with the diagnosis and plan. Written by valencia Brandon, as dictated by Seema Rosario M.D.    10:33 AM - 10:45 AM    Total time spent with the patient 12 minutes, greater than 50% of time spent counseling patient.

## 2019-07-30 LAB
25(OH)D3+25(OH)D2 SERPL-MCNC: 14.5 NG/ML (ref 30–100)
ALBUMIN SERPL-MCNC: 4.4 G/DL (ref 3.5–5.5)
ALBUMIN/CREAT UR: <2.7 MG/G CREAT (ref 0–30)
ALBUMIN/GLOB SERPL: 1.7 {RATIO} (ref 1.2–2.2)
ALP SERPL-CCNC: 79 IU/L (ref 39–117)
ALT SERPL-CCNC: 20 IU/L (ref 0–32)
AST SERPL-CCNC: 20 IU/L (ref 0–40)
BILIRUB SERPL-MCNC: 0.4 MG/DL (ref 0–1.2)
BUN SERPL-MCNC: 15 MG/DL (ref 6–24)
BUN/CREAT SERPL: 18 (ref 9–23)
CALCIUM SERPL-MCNC: 9.5 MG/DL (ref 8.7–10.2)
CHLORIDE SERPL-SCNC: 106 MMOL/L (ref 96–106)
CHOLEST SERPL-MCNC: 166 MG/DL (ref 100–199)
CO2 SERPL-SCNC: 23 MMOL/L (ref 20–29)
CREAT SERPL-MCNC: 0.85 MG/DL (ref 0.57–1)
CREAT UR-MCNC: 111.3 MG/DL
GLOBULIN SER CALC-MCNC: 2.6 G/DL (ref 1.5–4.5)
GLUCOSE SERPL-MCNC: 105 MG/DL (ref 65–99)
HDLC SERPL-MCNC: 69 MG/DL
INTERPRETATION, 910389: NORMAL
LDLC SERPL CALC-MCNC: 89 MG/DL (ref 0–99)
MICROALBUMIN UR-MCNC: <3 UG/ML
POTASSIUM SERPL-SCNC: 4.7 MMOL/L (ref 3.5–5.2)
PROT SERPL-MCNC: 7 G/DL (ref 6–8.5)
SODIUM SERPL-SCNC: 144 MMOL/L (ref 134–144)
TRIGL SERPL-MCNC: 39 MG/DL (ref 0–149)
VLDLC SERPL CALC-MCNC: 8 MG/DL (ref 5–40)

## 2019-08-04 DIAGNOSIS — E78.5 HYPERLIPIDEMIA WITH TARGET LDL LESS THAN 100: ICD-10-CM

## 2019-08-04 RX ORDER — ATORVASTATIN CALCIUM 20 MG/1
TABLET, FILM COATED ORAL
Qty: 90 TAB | Refills: 0 | Status: SHIPPED | OUTPATIENT
Start: 2019-08-04 | End: 2019-11-02 | Stop reason: SDUPTHER

## 2019-08-04 NOTE — PROGRESS NOTES
Inform pt to go to my chart to see results and recommendations        Your vitamin D levels are low. Please take vitamin D 3000 international units. ( available over the counter)  daily and get 15 minutes of sunlight. Please have vitamin D levels rechecked in 3 months.       Please work on diet and exercise, we need to that A1C back to the 6% range

## 2019-08-05 NOTE — PROGRESS NOTES
Mail lab results to patient  Your vitamin D levels are low. Please take vitamin D 3000 international units. ( available over the counter)  daily and get 15 minutes of sunlight. Please have vitamin D levels rechecked in 3 months.    Please work on diet and exercise, we need to that A1C back to the 6% range

## 2019-08-14 ENCOUNTER — OFFICE VISIT (OUTPATIENT)
Dept: FAMILY MEDICINE CLINIC | Age: 59
End: 2019-08-14

## 2019-08-14 VITALS
RESPIRATION RATE: 19 BRPM | DIASTOLIC BLOOD PRESSURE: 78 MMHG | OXYGEN SATURATION: 99 % | HEIGHT: 63 IN | SYSTOLIC BLOOD PRESSURE: 150 MMHG | BODY MASS INDEX: 39.97 KG/M2 | WEIGHT: 225.6 LBS | HEART RATE: 81 BPM | TEMPERATURE: 98.5 F

## 2019-08-14 DIAGNOSIS — I10 ESSENTIAL HYPERTENSION: Primary | ICD-10-CM

## 2019-08-14 DIAGNOSIS — E55.9 VITAMIN D DEFICIENCY: ICD-10-CM

## 2019-08-14 RX ORDER — ERGOCALCIFEROL 1.25 MG/1
50000 CAPSULE ORAL
Qty: 4 CAP | Refills: 2 | Status: SHIPPED | OUTPATIENT
Start: 2019-08-14 | End: 2019-11-09 | Stop reason: SDUPTHER

## 2019-08-14 RX ORDER — IRBESARTAN AND HYDROCHLOROTHIAZIDE 150; 12.5 MG/1; MG/1
1 TABLET, FILM COATED ORAL DAILY
Qty: 30 TAB | Refills: 5 | Status: SHIPPED | OUTPATIENT
Start: 2019-08-14 | End: 2020-01-23

## 2019-08-14 NOTE — PATIENT INSTRUCTIONS
Home Blood Pressure Test: About This Test  What is it? A home blood pressure test allows you to keep track of your blood pressure at home. Blood pressure is a measure of the force of blood against the walls of your arteries. Blood pressure readings include two numbers, such as 130/80 (say \"130 over 80\"). The first number is the systolic pressure. The second number is the diastolic pressure. Why is this test done? You may do this test at home to:  · Find out if you have high blood pressure. · Track your blood pressure if you have high blood pressure. · Track how well medicine is working to reduce high blood pressure. · Check how lifestyle changes, such as weight loss and exercise, are affecting blood pressure. How can you prepare for the test?  · Do not use caffeine, tobacco, or medicines known to raise blood pressure (such as nasal decongestant sprays) for at least 30 minutes before taking your blood pressure. · Do not exercise for at least 30 minutes before taking your blood pressure. What happens before the test?  Take your blood pressure while you feel comfortable and relaxed. Sit quietly with both feet on the floor for at least 5 minutes before the test.  What happens during the test?  · Sit with your arm slightly bent and resting on a table so that your upper arm is at the same level as your heart. · Roll up your sleeve or take off your shirt to expose your upper arm. · Wrap the blood pressure cuff around your upper arm so that the lower edge of the cuff is about 1 inch above the bend of your elbow. Proceed with the following steps depending on if you are using an automatic or manual pressure monitor. Automatic blood pressure monitors  · Press the on/off button on the automatic monitor and wait until the ready-to-measure \"heart\" symbol appears next to zero in the display window. · Press the start button. The cuff will inflate and deflate by itself.   · Your blood pressure numbers will appear on the screen. · Write your numbers in your log book, along with the date and time. Manual blood pressure monitors  · Place the earpieces of a stethoscope in your ears, and place the bell of the stethoscope over the artery, just below the cuff. · Close the valve on the rubber inflating bulb. · Squeeze the bulb rapidly with your opposite hand to inflate the cuff until the dial or column of mercury reads about 30 mm Hg higher than your usual systolic pressure. If you do not know your usual pressure, inflate the cuff to 210 mm Hg or until the pulse at your wrist disappears. · Open the pressure valve just slightly by twisting or pressing the valve on the bulb. · As you watch the pressure slowly fall, note the level on the dial at which you first start to hear a pulsing or tapping sound through the stethoscope. This is your systolic blood pressure. · Continue letting the air out slowly. The sounds will become muffled and will finally disappear. Note the pressure when the sounds completely disappear. This is your diastolic blood pressure. Let out all the remaining air. · Write your numbers in your log book, along with the date and time. What else should you know about the test?  It is more accurate to take the average of several readings made throughout the day than to rely on a single reading. It's normal for blood pressure to go up and down throughout the day. Follow-up care is a key part of your treatment and safety. Be sure to make and go to all appointments, and call your doctor if you are having problems. It's also a good idea to keep a list of the medicines you take. Where can you learn more? Go to http://nahomy-najma.info/. Enter C427 in the search box to learn more about \"Home Blood Pressure Test: About This Test.\"  Current as of: July 22, 2018  Content Version: 12.1  © 6081-4869 Healthwise[employer].  Care instructions adapted under license by [employer] (which disclaims liability or warranty for this information). If you have questions about a medical condition or this instruction, always ask your healthcare professional. [address] any warranty or liability for your use of this information.

## 2019-08-14 NOTE — PROGRESS NOTES
History of Present Illness  Santa Bell is a 61 y.o. female who presents to the office today for follow up of routine medical issues. Hypertension: BP at office today was elevated at 166/82 and 150/78 on manual recheck. Pt continues with irbesartan 75mg. Pt reports that her BP has been elevated at home with systolic pressures 372-458 and diastolic 93-79. She endorses occasional HA. She denies CP, SOB, palpitations, vision changes. Vitamin D deficiency: Pt notes that she started taking OTC vitamin D supplement, but felt that this caused her to have a HA, so she stopped taking it. Vitamin D was 14.5 on 07/29/2019. DM2: A1c per POC on 07/29/2019 was 7.0, increased from A1c of 6.0 on 11/26/2018. Pt continues with metformin ER 750mg and pioglitazone 30mg. Hyperlipidemia: Lipid panel on 07/29/2019 notable for total cholesterol 166, HDL 69, LDL 89, and triglycerides 39. Pt continues with atorvastatin 20mg. Routine Health Maintenance: Pt is followed by Dr. Maritza Lei (OB/GYN). She has been told that she does not need to have any further pap smears. /78   Pulse 81   Temp 98.5 °F (36.9 °C) (Oral)   Resp 19   Ht 5' 3\" (1.6 m)   Wt 225 lb 9.6 oz (102.3 kg)   LMP 06/30/2013   SpO2 99%   BMI 39.96 kg/m²     Current Outpatient Medications   Medication Sig Dispense Refill    irbesartan-hydroCHLOROthiazide (AVALIDE) 150-12.5 mg per tablet Take 1 Tab by mouth daily. 30 Tab 5    ergocalciferol (ERGOCALCIFEROL) 50,000 unit capsule Take 1 Cap by mouth every seven (7) days. 4 Cap 2    atorvastatin (LIPITOR) 20 mg tablet TAKE 1 TABLET BY MOUTH EVERY DAY 90 Tab 0    pioglitazone (ACTOS) 30 mg tablet TAKE 1 TABLET BY MOUTH EVERY DAY 90 Tab 0    metFORMIN ER (GLUCOPHAGE XR) 750 mg tablet TAKE 1 TABLET BY MOUTH DAILY.  90 Tab 1    glucose blood VI test strips (ACCU-CHEK OSCAR PLUS TEST STRP) strip Check blood sugar daily DX:E11.9 50 Strip 5    Blood-Glucose Meter (ACCU-CHEK OSCAR PLUS METER) misc Check blood sugar daily DX:E11.9 1 Each 0    ONETOUCH ULTRA BLUE TEST STRIP strip USE DAILY AS DIRECTED 100 Strip 1    Insulin Needles, Disposable, (BD ULTRA-FINE MICRO PEN NEEDLE) 32 gauge x 1/4\" ndle Use as directed once daily 100 Pen Needle 3    Liraglutide (VICTOZA 2-MAYLIN) 0.6 mg/0.1 mL (18 mg/3 mL) pnij 1.8 mg by SubCUTAneous route daily. 3 Pen 4     Allergies   Allergen Reactions    Pcn [Penicillins] Rash     Past Medical History:   Diagnosis Date    Diabetes ([facility] [address]     Hypercholesterolemia     Hypertension      History reviewed. No pertinent surgical history. Family History   Problem Relation Age of Onset    Diabetes Mother     Diabetes Father     Diabetes Maternal Aunt     Diabetes Maternal Grandmother     Diabetes Maternal Grandfather     Diabetes Paternal Grandmother     Diabetes Paternal Grandfather      Social History     Tobacco Use    Smoking status: Never Smoker    Smokeless tobacco: Never Used   Substance Use Topics    Alcohol use: No        Review of Systems   Constitutional: Negative for chills and fever. HENT: Negative for hearing loss and tinnitus. Eyes: Negative for blurred vision and double vision. Respiratory: Negative for shortness of breath. Cardiovascular: Negative for chest pain and palpitations. Gastrointestinal: Negative for nausea and vomiting. Genitourinary: Negative for dysuria and frequency. Musculoskeletal: Negative for back pain and falls. Skin: Negative for itching and rash. Neurological: Negative for dizziness and headaches. Psychiatric/Behavioral: Negative for depression. The patient is not nervous/anxious. Physical Exam   Constitutional: She is oriented to person, place, and time and well-developed, well-nourished, and in no distress. HENT:   Head: Normocephalic and atraumatic.    Right Ear: External ear normal.   Left Ear: External ear normal.   Nose: Nose normal.   Mouth/Throat: Oropharynx is clear and moist.   Eyes: Conjunctivae and EOM are normal.   Neck: Normal range of motion. Neck supple. Carotid bruit is not present. Cardiovascular: Normal rate, regular rhythm, normal heart sounds and intact distal pulses. Pulmonary/Chest: Effort normal and breath sounds normal.   Abdominal: Soft. Bowel sounds are normal.   Musculoskeletal: Normal range of motion. Neurological: She is alert and oriented to person, place, and time. Skin: Skin is warm and dry. Psychiatric: Mood, affect and judgment normal.   Nursing note and vitals reviewed. Diagnoses and all orders for this visit:    1. Essential hypertension  -     irbesartan-hydroCHLOROthiazide (AVALIDE) 150-12.5 mg per tablet; Take 1 Tab by mouth daily. 2. Vitamin D deficiency  -     ergocalciferol (ERGOCALCIFEROL) 50,000 unit capsule; Take 1 Cap by mouth every seven (7) days. 1. HTN  Pt's BP is not well controlled at this time. Prescribed irbesartan-HCTZ 150-12.5mg. Pt should monitor her BP at home and follow up in 15 weeks. 2. Vitamin D deficiency  Prescribed ergocalciferol once per week. Medication risks/benefits/costs/interactions/alternatives discussed with patient. Advised patient to call back or return to office if symptoms worsen/change/persist.  If patient cannot reach us or should anything more severe/urgent arise she should proceed directly to the nearest emergency department. Discussed expected course/resolution/complications of diagnosis in detail with patient. Patient given a written after visit summary which includes her diagnoses, current medications and vitals. Patient expressed understanding with the diagnosis and plan. Written by Lauri Garcia, as dictated by Ayesha Hills M.D.     9:33 AM - 9:44 AM     Total time spent with the patient was 11 minutes, greater than 50% of time spent counseling patient.

## 2019-08-14 NOTE — PROGRESS NOTES
Chief Complaint   Patient presents with    Hypertension     2 week follow up       1. Have you been to the ER, urgent care clinic since your last visit? Hospitalized since your last visit? No    2. Have you seen or consulted any other health care providers outside of the [address] [place] [address] since your last visit? Include any pap smears or colon screening.  No

## 2019-08-28 ENCOUNTER — OFFICE VISIT (OUTPATIENT)
Dept: FAMILY MEDICINE CLINIC | Age: 59
End: 2019-08-28

## 2019-08-28 VITALS
DIASTOLIC BLOOD PRESSURE: 60 MMHG | WEIGHT: 225.8 LBS | HEART RATE: 80 BPM | SYSTOLIC BLOOD PRESSURE: 124 MMHG | TEMPERATURE: 98.2 F | BODY MASS INDEX: 40.01 KG/M2 | OXYGEN SATURATION: 100 % | HEIGHT: 63 IN | RESPIRATION RATE: 18 BRPM

## 2019-08-28 DIAGNOSIS — Z23 ENCOUNTER FOR IMMUNIZATION: ICD-10-CM

## 2019-08-28 DIAGNOSIS — E11.9 CONTROLLED TYPE 2 DIABETES MELLITUS WITHOUT COMPLICATION, WITHOUT LONG-TERM CURRENT USE OF INSULIN (HCC): Primary | ICD-10-CM

## 2019-08-28 DIAGNOSIS — I10 ESSENTIAL HYPERTENSION: ICD-10-CM

## 2019-08-28 LAB — HBA1C MFR BLD HPLC: 7 %

## 2019-08-28 NOTE — PATIENT INSTRUCTIONS
Counting Carbohydrates: Care Instructions  Your Care Instructions    You don't have to eat special foods when you have diabetes. You just have to be careful to eat healthy foods. Carbohydrates (carbs) raise blood sugar higher and quicker than any other nutrient. Carbs are found in desserts, breads and cereals, and fruit. They're also in starchy vegetables. These include potatoes, corn, and grains such as rice and pasta. Carbs are also in milk and yogurt. The more carbs you eat at one time, the higher your blood sugar will rise. Spreading carbs all through the day helps keep your blood sugar levels within your target range. Counting carbs is one of the best ways to keep your blood sugar under control. If you use insulin, counting carbs helps you match the right amount of insulin to the number of grams of carbs in a meal. Then you can change your diet and insulin dose as needed. Testing your blood sugar several times a day can help you learn how carbs affect your blood sugar. A registered dietitian or certified diabetes educator can help you plan meals and snacks. Follow-up care is a key part of your treatment and safety. Be sure to make and go to all appointments, and call your doctor if you are having problems. It's also a good idea to know your test results and keep a list of the medicines you take. How can you care for yourself at home? Know your daily amount of carbohydrates  Your daily amount depends on several things, such as your weight, how active you are, which diabetes medicines you take, and what your goals are for your blood sugar levels. A registered dietitian or certified diabetes educator can help you plan how many carbs to include in each meal and snack. For most adults, a guideline for the daily amount of carbs is:  · 45 to 60 grams at each meal. That's about the same as 3 to 4 carbohydrate servings. · 15 to 20 grams at each snack. That's about the same as 1 carbohydrate serving.   Count carbs  Counting carbs lets you know how much rapid-acting insulin to take before you eat. If you use an insulin pump, you get a constant rate of insulin during the day. So the pump must be programmed at meals. This gives you extra insulin to cover the rise in blood sugar after meals. If you take insulin:  · Learn your own insulin-to-carb ratio. You and your diabetes health professional will figure out the ratio. You can do this by testing your blood sugar after meals. For example, you may need a certain amount of insulin for every 15 grams of carbs. · Add up the carb grams in a meal. Then you can figure out how many units of insulin to take based on your insulin-to-carb ratio. · Exercise lowers blood sugar. You can use less insulin than you would if you were not doing exercise. Keep in mind that timing matters. If you exercise within 1 hour after a meal, your body may need less insulin for that meal than it would if you exercised 3 hours after the meal. Test your blood sugar to find out how exercise affects your need for insulin. If you do or don't take insulin:  · Look at labels on packaged foods. This can tell you how many carbs are in a serving. You can also use guides from the American Diabetes Association. · Be aware of portions, or serving sizes. If a package has two servings and you eat the whole package, you need to double the number of grams of carbohydrate listed for one serving. · Protein, fat, and fiber do not raise blood sugar as much as carbs do. If you eat a lot of these nutrients in a meal, your blood sugar will rise more slowly than it would otherwise. Eat from all food groups  · Eat at least three meals a day. · Plan meals to include food from all the food groups. The food groups include grains, fruits, dairy, proteins, and vegetables. · Talk to your dietitian or diabetes educator about ways to add limited amounts of sweets into your meal plan. · If you drink alcohol, talk to your doctor. It may not be recommended when you are taking certain diabetes medicines. Where can you learn more? Go to http://nahomy-najma.info/. Enter P689 in the search box to learn more about \"Counting Carbohydrates: Care Instructions. \"  Current as of: July 25, 2018  Content Version: 12.1  © 1167-0026 [employer]. Care instructions adapted under license by [employer] (which disclaims liability or warranty for this information). If you have questions about a medical condition or this instruction, always ask your healthcare professional. [address] any warranty or liability for your use of this information.

## 2019-08-28 NOTE — PROGRESS NOTES
History of Present Illness  Parminder Zhang is a 61 y.o. female who presents to the office today for follow up of routine medical issues. Hypertension: Pt was last seen on 08/14/2019, at which time her BP was markedly elevated. At that time, she was taking irbesartan 75mg. BP at office today was 140/86 and 124/60 on manual recheck. Pt continues with irbesartan-HCTZ 150-12.5mg as prescribed at last visit. She reports that her BP has been 140s/80s at home. DM2: A1c per POC today was 7.0, unchanged from A1c of 7.0 on 07/29/2019. Pt continues with metformin ER 750mg, liraglutide, actos 30mg. Pt reports that her BG has been in 80s recently. Routine health maintenance: Pt reports that she will be traveling to [facility] ([place]) in 11/2019. She had immunization for yellow fever 7 years ago, but will need this again. She will also require prophylaxis for malaria prior to this trip. /60   Pulse 80   Temp 98.2 °F (36.8 °C) (Oral)   Resp 18   Ht 5' 3\" (1.6 m)   Wt 225 lb 12.8 oz (102.4 kg)   LMP 06/30/2013   SpO2 100%   BMI 40.00 kg/m²     Current Outpatient Medications   Medication Sig Dispense Refill    irbesartan-hydroCHLOROthiazide (AVALIDE) 150-12.5 mg per tablet Take 1 Tab by mouth daily. 30 Tab 5    ergocalciferol (ERGOCALCIFEROL) 50,000 unit capsule Take 1 Cap by mouth every seven (7) days. 4 Cap 2    atorvastatin (LIPITOR) 20 mg tablet TAKE 1 TABLET BY MOUTH EVERY DAY 90 Tab 0    pioglitazone (ACTOS) 30 mg tablet TAKE 1 TABLET BY MOUTH EVERY DAY 90 Tab 0    metFORMIN ER (GLUCOPHAGE XR) 750 mg tablet TAKE 1 TABLET BY MOUTH DAILY.  90 Tab 1    glucose blood VI test strips (ACCU-CHEK OSCAR PLUS TEST STRP) strip Check blood sugar daily DX:E11.9 50 Strip 5    Blood-Glucose Meter (ACCU-CHEK OSCAR PLUS METER) misc Check blood sugar daily DX:E11.9 1 Each 0    ONETOUCH ULTRA BLUE TEST STRIP strip USE DAILY AS DIRECTED 100 Strip 1    Insulin Needles, Disposable, (BD ULTRA-FINE MICRO PEN NEEDLE) 32 gauge x 1/4\" ndle Use as directed once daily 100 Pen Needle 3    Liraglutide (VICTOZA 2-MAYLIN) 0.6 mg/0.1 mL (18 mg/3 mL) pnij 1.8 mg by SubCUTAneous route daily. 3 Pen 4     Allergies   Allergen Reactions    Pcn [Penicillins] Rash     Past Medical History:   Diagnosis Date    Diabetes ([facility] [address]     Hypercholesterolemia     Hypertension      History reviewed. No pertinent surgical history. Family History   Problem Relation Age of Onset    Diabetes Mother     Diabetes Father     Diabetes Maternal Aunt     Diabetes Maternal Grandmother     Diabetes Maternal Grandfather     Diabetes Paternal Grandmother     Diabetes Paternal Grandfather      Social History     Tobacco Use    Smoking status: Never Smoker    Smokeless tobacco: Never Used   Substance Use Topics    Alcohol use: No        Review of Systems   Constitutional: Negative for chills and fever. HENT: Negative for hearing loss and tinnitus. Eyes: Negative for blurred vision and double vision. Respiratory: Negative for shortness of breath. Cardiovascular: Negative for chest pain and palpitations. Gastrointestinal: Negative for nausea and vomiting. Genitourinary: Negative for dysuria and frequency. Musculoskeletal: Negative for back pain and falls. Skin: Negative for itching and rash. Neurological: Negative for dizziness and headaches. Psychiatric/Behavioral: Negative for depression. The patient is not nervous/anxious. Physical Exam   Constitutional: She is oriented to person, place, and time and well-developed, well-nourished, and in no distress. HENT:   Head: Normocephalic and atraumatic. Right Ear: External ear normal.   Left Ear: External ear normal.   Nose: Nose normal.   Mouth/Throat: Oropharynx is clear and moist.   Eyes: Conjunctivae and EOM are normal.   Neck: Normal range of motion. Neck supple. Cardiovascular: Normal rate, regular rhythm, normal heart sounds and intact distal pulses.    Pulmonary/Chest: Effort normal and breath sounds normal.   Abdominal: Soft. Bowel sounds are normal.   Musculoskeletal: Normal range of motion. Neurological: She is alert and oriented to person, place, and time. Skin: Skin is warm and dry. Psychiatric: Mood, affect and judgment normal.   Nursing note and vitals reviewed. Diagnoses and all orders for this visit:    1. Controlled type 2 diabetes mellitus without complication, without long-term current use of insulin (HCC)  -     AMB POC HEMOGLOBIN A1C    2. Essential hypertension    3. Encounter for immunization  -     INFLUENZA VIRUS VAC QUAD,SPLIT,PRESV FREE SYRINGE IM  -     TX IMMUNIZ ADMIN,1 SINGLE/COMB VAC/TOXOID    1. DM type 2  Pt's blood sugar seems to be well controlled. I advised pt to continue current dose of metformin, actos, and liraglutide, avoid sugars and starches, and to increase exercise when possible. Recheck hemoglobin A1c.     2. Hypertension  BP seems to be well controlled. I recommended continuing current dose of irbesartan-HCTZ, eating a low sodium diet, and increasing exercise. Advised pt to check her BP if she experiences any sx. 3. Encounter for immunization  Pt received influenza vaccine today in the office. Advised her to go to [address] [place] [address] for yellow fever vaccine. Medication risks/benefits/costs/interactions/alternatives discussed with patient. Advised patient to call back or return to office if symptoms worsen/change/persist.  If patient cannot reach us or should anything more severe/urgent arise she should proceed directly to the nearest emergency department. Discussed expected course/resolution/complications of diagnosis in detail with patient. Patient given a written after visit summary which includes her diagnoses, current medications and vitals. Patient expressed understanding with the diagnosis and plan.      Written by Maddy Jones, as dictated by Tonya Verdugo M.D.     3:48 PM - 3:58 PM     Total time spent with the patient was 10 minutes, greater than 50% of time spent counseling patient.

## 2019-08-28 NOTE — PROGRESS NOTES
Chief Complaint   Patient presents with    Hypertension     2 week f/u       Reviewed Record in preparation for visit and have obtained necessary documentation. Identified pt with two pt identifiers (Name @ )    Health Maintenance Due   Topic    PAP AKA CERVICAL CYTOLOGY          1. Have you been to the ER, urgent care clinic since your last visit? Hospitalized since your last visit? no    2. Have you seen or consulted any other health care providers outside of the [address] [place] [address] since your last visit? Include any pap smears or colon screening.  no

## 2019-09-29 DIAGNOSIS — E11.9 CONTROLLED TYPE 2 DIABETES MELLITUS WITHOUT COMPLICATION, WITHOUT LONG-TERM CURRENT USE OF INSULIN (HCC): ICD-10-CM

## 2019-09-30 RX ORDER — METFORMIN HYDROCHLORIDE 750 MG/1
TABLET, EXTENDED RELEASE ORAL
Qty: 90 TAB | Refills: 1 | Status: SHIPPED | OUTPATIENT
Start: 2019-09-30 | End: 2020-02-17 | Stop reason: SDUPTHER

## 2019-10-18 DIAGNOSIS — E11.9 CONTROLLED TYPE 2 DIABETES MELLITUS WITHOUT COMPLICATION, WITHOUT LONG-TERM CURRENT USE OF INSULIN (HCC): ICD-10-CM

## 2019-10-19 RX ORDER — PIOGLITAZONEHYDROCHLORIDE 30 MG/1
TABLET ORAL
Qty: 90 TAB | Refills: 0 | Status: SHIPPED | OUTPATIENT
Start: 2019-10-19 | End: 2020-01-09

## 2019-11-02 DIAGNOSIS — E78.5 HYPERLIPIDEMIA WITH TARGET LDL LESS THAN 100: ICD-10-CM

## 2019-11-04 RX ORDER — ATORVASTATIN CALCIUM 20 MG/1
TABLET, FILM COATED ORAL
Qty: 90 TAB | Refills: 0 | Status: SHIPPED | OUTPATIENT
Start: 2019-11-04 | End: 2020-01-28

## 2019-11-09 DIAGNOSIS — E55.9 VITAMIN D DEFICIENCY: ICD-10-CM

## 2019-11-09 RX ORDER — ERGOCALCIFEROL 1.25 MG/1
CAPSULE ORAL
Qty: 12 CAP | Refills: 0 | Status: SHIPPED | OUTPATIENT
Start: 2019-11-09 | End: 2019-11-13 | Stop reason: SDUPTHER

## 2019-11-13 ENCOUNTER — OFFICE VISIT (OUTPATIENT)
Dept: FAMILY MEDICINE CLINIC | Age: 59
End: 2019-11-13

## 2019-11-13 VITALS
TEMPERATURE: 98.2 F | RESPIRATION RATE: 18 BRPM | DIASTOLIC BLOOD PRESSURE: 70 MMHG | OXYGEN SATURATION: 97 % | BODY MASS INDEX: 40.04 KG/M2 | HEART RATE: 80 BPM | WEIGHT: 226 LBS | SYSTOLIC BLOOD PRESSURE: 130 MMHG | HEIGHT: 63 IN

## 2019-11-13 DIAGNOSIS — I10 ESSENTIAL HYPERTENSION: ICD-10-CM

## 2019-11-13 DIAGNOSIS — E55.9 VITAMIN D DEFICIENCY: ICD-10-CM

## 2019-11-13 DIAGNOSIS — Z71.84 COUNSELING FOR TRAVEL: ICD-10-CM

## 2019-11-13 DIAGNOSIS — E11.9 CONTROLLED TYPE 2 DIABETES MELLITUS WITHOUT COMPLICATION, WITHOUT LONG-TERM CURRENT USE OF INSULIN (HCC): Primary | ICD-10-CM

## 2019-11-13 DIAGNOSIS — E78.5 HYPERLIPIDEMIA WITH TARGET LDL LESS THAN 100: ICD-10-CM

## 2019-11-13 LAB — HBA1C MFR BLD HPLC: 7.2 %

## 2019-11-13 RX ORDER — CIPROFLOXACIN 500 MG/1
500 TABLET ORAL 2 TIMES DAILY
Qty: 20 TAB | Refills: 0 | Status: SHIPPED | OUTPATIENT
Start: 2019-11-13 | End: 2019-11-23

## 2019-11-13 RX ORDER — ERGOCALCIFEROL 1.25 MG/1
CAPSULE ORAL
Qty: 12 CAP | Refills: 0 | Status: SHIPPED | OUTPATIENT
Start: 2019-11-13 | End: 2020-04-21 | Stop reason: SDUPTHER

## 2019-11-13 RX ORDER — AZITHROMYCIN 250 MG/1
TABLET, FILM COATED ORAL
Qty: 6 TAB | Refills: 0 | Status: SHIPPED | OUTPATIENT
Start: 2019-11-13 | End: 2019-11-18

## 2019-11-13 RX ORDER — ATOVAQUONE AND PROGUANIL HYDROCHLORIDE 250; 100 MG/1; MG/1
TABLET, FILM COATED ORAL
Qty: 25 TAB | Refills: 0 | Status: SHIPPED | OUTPATIENT
Start: 2019-11-13 | End: 2020-09-01

## 2019-11-13 NOTE — PROGRESS NOTES
Chief Complaint   Patient presents with    Diabetes     f/u       Reviewed Record in preparation for visit and have obtained necessary documentation. Identified pt with two pt identifiers (Name @ )    Health Maintenance Due   Topic    PAP AKA CERVICAL CYTOLOGY     COLONOSCOPY          1. Have you been to the ER, urgent care clinic since your last visit? Hospitalized since your last visit?no    2. Have you seen or consulted any other health care providers outside of the [address] [place] [address] since your last visit? Include any pap smears or colon screening.  no

## 2019-11-13 NOTE — PROGRESS NOTES
HPI  Luigi Campbell 61 y.o. female  presents to the office today for follow up on chronic conditions. Blood pressure 130/70, pulse 80, temperature 98.2 °F (36.8 °C), temperature source Oral, resp. rate 18, height 5' 3\" (1.6 m), weight 226 lb (102.5 kg), last menstrual period 06/30/2013, SpO2 97 %. Body mass index is 40.03 kg/m². Chief Complaint   Patient presents with    Diabetes     f/u    Other     gooing to [facility] ([place]), needs rxs for travel    Cough     x 2 weeks, little mucus- clear. No sob, fever,or wheezing. DM2: A1c per POC today 7.2, increased from A1c of 7.0 on 8/28/19. Pt continues with Actos 30 mg/d, Metformin  mg/d and Victoza injections. Hyperlipidemia: Lipid panel on 7/29/19 notable for total cholesterol 166, HDL 69, LDL 89, and triglycerides 39. Pt continues with Atorvastatin 20 mg/d. Hypertension: BP at office today 140/75 and 130/70 on manual recheck. Pt continues with Avalide 150-12.5 mg/d. Vitamin D deficiency: Pt's vitamin D levels were 14.5 on 7/29/19. Pt continues with Ergocalciferol 50,000U weekly. Pt notes that prescription vitD does not cause HA like OTC vitD supplements. Pt reports onset of cough x 10 days. Pt admits to chills, clear sputum production; denies SOB, fever or wheezing. Pt notes that the cough is better during the day compared to at night. Pt has tried OTC Percocet, Dayquil, and diabetic Tussin x 8 days, but the cough persists. Pt notes that she is traveling to [facility] ([place]) on 11/24/19 for 10 days. Pt will go to [facility] pharmacy to obtain required immunization for travel. Current Outpatient Medications   Medication Sig Dispense Refill    atovaquone-proguanil (MALARONE) 250-100 mg per tablet Take 2 days prior to exposure and 7 days after exposure 25 Tab 0    typhoid vi polysacch vaccine (TYPHIM VI) 25 mcg/0.5 mL injection 0.5 mL by IntraMUSCular route once for 1 dose.  1 Vial 0    azithromycin (ZITHROMAX) 250 mg tablet Take 2 tablets today, then take 1 tablet daily 6 Tab 0    ciprofloxacin HCl (CIPRO) 500 mg tablet Take 1 Tab by mouth two (2) times a day for 10 days. 20 Tab 0    ergocalciferol (ERGOCALCIFEROL) 50,000 unit capsule TAKE ONE CAPSULE BY MOUTH ONE TIME PER WEEK 12 Cap 0    atorvastatin (LIPITOR) 20 mg tablet TAKE 1 TABLET BY MOUTH EVERY DAY 90 Tab 0    pioglitazone (ACTOS) 30 mg tablet TAKE 1 TABLET BY MOUTH EVERY DAY 90 Tab 0    metFORMIN ER (GLUCOPHAGE XR) 750 mg tablet TAKE 1 TABLET BY MOUTH EVERY DAY 90 Tab 1    irbesartan-hydroCHLOROthiazide (AVALIDE) 150-12.5 mg per tablet Take 1 Tab by mouth daily. 30 Tab 5    glucose blood VI test strips (ACCU-CHEK OSCAR PLUS TEST STRP) strip Check blood sugar daily DX:E11.9 50 Strip 5    Blood-Glucose Meter (ACCU-CHEK OSCAR PLUS METER) misc Check blood sugar daily DX:E11.9 1 Each 0    ONETOUCH ULTRA BLUE TEST STRIP strip USE DAILY AS DIRECTED 100 Strip 1    Insulin Needles, Disposable, (BD ULTRA-FINE MICRO PEN NEEDLE) 32 gauge x 1/4\" ndle Use as directed once daily 100 Pen Needle 3    Liraglutide (VICTOZA 2-MAYLIN) 0.6 mg/0.1 mL (18 mg/3 mL) pnij 1.8 mg by SubCUTAneous route daily. 3 Pen 4     Allergies   Allergen Reactions    Pcn [Penicillins] Rash     Past Medical History:   Diagnosis Date    Diabetes ([facility] [address]     Hypercholesterolemia     Hypertension      History reviewed. No pertinent surgical history. Family History   Problem Relation Age of Onset    Diabetes Mother     Diabetes Father     Diabetes Maternal Aunt     Diabetes Maternal Grandmother     Diabetes Maternal Grandfather     Diabetes Paternal Grandmother     Diabetes Paternal Grandfather      Social History     Tobacco Use    Smoking status: Never Smoker    Smokeless tobacco: Never Used   Substance Use Topics    Alcohol use: No        Review of Systems   Constitutional: Positive for chills. Negative for fever. HENT: Negative for hearing loss and tinnitus. Eyes: Negative for blurred vision and double vision. Respiratory: Positive for cough and sputum production (clear). Negative for shortness of breath and wheezing. Cardiovascular: Negative for chest pain and palpitations. Gastrointestinal: Negative for nausea and vomiting. Genitourinary: Negative for dysuria and frequency. Musculoskeletal: Negative for back pain and falls. Skin: Negative for itching and rash. Neurological: Negative for dizziness, loss of consciousness and headaches. Psychiatric/Behavioral: Negative for depression. The patient is not nervous/anxious. Physical Exam   Constitutional: She is oriented to person, place, and time. She appears well-developed and well-nourished. HENT:   Head: Normocephalic and atraumatic. Right Ear: External ear normal.   Left Ear: External ear normal.   Nose: Nose normal.   Mouth/Throat: Oropharynx is clear and moist.   Eyes: Conjunctivae and EOM are normal.   Neck: Normal range of motion. Neck supple. Cardiovascular: Normal rate, regular rhythm, normal heart sounds and intact distal pulses. Pulmonary/Chest: Effort normal and breath sounds normal.   Abdominal: Soft. Bowel sounds are normal.   Musculoskeletal: Normal range of motion. Neurological: She is alert and oriented to person, place, and time. Skin: Skin is warm and dry. Psychiatric: She has a normal mood and affect. Her behavior is normal. Judgment and thought content normal.   Nursing note and vitals reviewed. ASSESSMENT and PLAN  Diagnoses and all orders for this visit:    1. Controlled type 2 diabetes mellitus without complication, without long-term current use of insulin ([facility])  A1c today 7.2. Pt continues with Actos 30 mg/d, Metformin  mg/d and Victoza injections. -     AMB POC HEMOGLOBIN A1C    2. Hyperlipidemia with target LDL less than 100  Last LDL at goal. Presumed stable. Pt continues with Atorvastatin 20 mg/d. 3. Essential hypertension  BP is at goal today in office.  Advised pt to continue with Avalide 150-12.5 mg/d. 4. Counseling for travel  Sent Malarone 250-100 mg and order for Typhoid vaccine to [facility] pharmacy. Sent Zpak 250 mg and Cipro 500 mg to [facility] pharmacy. -     atovaquone-proguanil (MALARONE) 250-100 mg per tablet; Take 2 days prior to exposure and 7 days after exposure  -     typhoid vi polysacch vaccine (TYPHIM VI) 25 mcg/0.5 mL injection; 0.5 mL by IntraMUSCular route once for 1 dose. -     azithromycin (ZITHROMAX) 250 mg tablet; Take 2 tablets today, then take 1 tablet daily  -     ciprofloxacin HCl (CIPRO) 500 mg tablet; Take 1 Tab by mouth two (2) times a day for 10 days. 5. Vitamin D deficiency  Presumed stable. Pt continues with Ergocalciferol 50,000U weekly. -     ergocalciferol (ERGOCALCIFEROL) 50,000 unit capsule; TAKE ONE CAPSULE BY MOUTH ONE TIME PER WEEK    Follow-up and Dispositions    · Return in about 3 months (around 2/13/2020) for hypertension, diabetes, cholesterol follow up. Medication risks/benefits/costs/interactions/alternatives discussed with patient. Advised patient to call back or return to office if symptoms worsen/change/persist.  If patient cannot reach us or should anything more severe/urgent arise he/she should proceed directly to the nearest emergency department. Discussed expected course/resolution/complications of diagnosis in detail with patient. Patient given a written after visit summary which includes her diagnoses, current medications and vitals. Patient expressed understanding with the diagnosis and plan. Written by valencia Nagel, as dictated by Magdi Eubanks M.D.    9:24 AM - 9:40 AM    Total time spent with the patient 16 minutes, greater than 50% of time spent counseling patient.

## 2019-12-20 DIAGNOSIS — E11.9 CONTROLLED TYPE 2 DIABETES MELLITUS WITHOUT COMPLICATION, WITHOUT LONG-TERM CURRENT USE OF INSULIN (HCC): ICD-10-CM

## 2019-12-23 RX ORDER — LIRAGLUTIDE 6 MG/ML
INJECTION SUBCUTANEOUS
Qty: 1 ADJUSTABLE DOSE PRE-FILLED PEN SYRINGE | Refills: 5 | Status: SHIPPED | OUTPATIENT
Start: 2019-12-23 | End: 2020-08-13 | Stop reason: SDUPTHER

## 2020-01-09 DIAGNOSIS — E11.9 CONTROLLED TYPE 2 DIABETES MELLITUS WITHOUT COMPLICATION, WITHOUT LONG-TERM CURRENT USE OF INSULIN (HCC): ICD-10-CM

## 2020-01-09 RX ORDER — PIOGLITAZONEHYDROCHLORIDE 30 MG/1
TABLET ORAL
Qty: 90 TAB | Refills: 0 | Status: SHIPPED | OUTPATIENT
Start: 2020-01-09 | End: 2020-04-02 | Stop reason: SDUPTHER

## 2020-01-20 DIAGNOSIS — Z79.4 CONTROLLED TYPE 2 DIABETES MELLITUS WITH COMPLICATION, WITH LONG-TERM CURRENT USE OF INSULIN (HCC): ICD-10-CM

## 2020-01-20 DIAGNOSIS — E11.8 CONTROLLED TYPE 2 DIABETES MELLITUS WITH COMPLICATION, WITH LONG-TERM CURRENT USE OF INSULIN (HCC): ICD-10-CM

## 2020-01-21 DIAGNOSIS — I10 ESSENTIAL HYPERTENSION: ICD-10-CM

## 2020-01-21 RX ORDER — BLOOD SUGAR DIAGNOSTIC
STRIP MISCELLANEOUS
Qty: 100 PEN NEEDLE | Refills: 3 | Status: SHIPPED | OUTPATIENT
Start: 2020-01-21 | End: 2021-05-20

## 2020-01-23 RX ORDER — IRBESARTAN AND HYDROCHLOROTHIAZIDE 150; 12.5 MG/1; MG/1
TABLET, FILM COATED ORAL
Qty: 90 TAB | Refills: 1 | Status: SHIPPED | OUTPATIENT
Start: 2020-01-23 | End: 2020-07-05

## 2020-01-28 DIAGNOSIS — E78.5 HYPERLIPIDEMIA WITH TARGET LDL LESS THAN 100: ICD-10-CM

## 2020-01-28 RX ORDER — ATORVASTATIN CALCIUM 20 MG/1
TABLET, FILM COATED ORAL
Qty: 90 TAB | Refills: 1 | Status: SHIPPED | OUTPATIENT
Start: 2020-01-28 | End: 2020-08-20 | Stop reason: SDUPTHER

## 2020-02-17 ENCOUNTER — OFFICE VISIT (OUTPATIENT)
Dept: FAMILY MEDICINE CLINIC | Age: 60
End: 2020-02-17

## 2020-02-17 VITALS
TEMPERATURE: 97.4 F | HEIGHT: 63 IN | OXYGEN SATURATION: 98 % | DIASTOLIC BLOOD PRESSURE: 74 MMHG | BODY MASS INDEX: 41.64 KG/M2 | WEIGHT: 235 LBS | RESPIRATION RATE: 18 BRPM | SYSTOLIC BLOOD PRESSURE: 130 MMHG | HEART RATE: 78 BPM

## 2020-02-17 DIAGNOSIS — I10 ESSENTIAL HYPERTENSION: ICD-10-CM

## 2020-02-17 DIAGNOSIS — Z12.11 COLON CANCER SCREENING: ICD-10-CM

## 2020-02-17 DIAGNOSIS — E66.01 OBESITY, CLASS III, BMI 40-49.9 (MORBID OBESITY) (HCC): ICD-10-CM

## 2020-02-17 DIAGNOSIS — E78.5 HYPERLIPIDEMIA WITH TARGET LDL LESS THAN 100: ICD-10-CM

## 2020-02-17 DIAGNOSIS — E11.65 UNCONTROLLED TYPE 2 DIABETES MELLITUS WITH HYPERGLYCEMIA (HCC): Primary | ICD-10-CM

## 2020-02-17 LAB — HBA1C MFR BLD HPLC: 7.8 %

## 2020-02-17 RX ORDER — METFORMIN HYDROCHLORIDE 750 MG/1
TABLET, EXTENDED RELEASE ORAL
Qty: 180 TAB | Refills: 1 | Status: SHIPPED | OUTPATIENT
Start: 2020-02-17 | End: 2020-02-17

## 2020-02-17 RX ORDER — METFORMIN HYDROCHLORIDE 750 MG/1
750 TABLET, EXTENDED RELEASE ORAL 2 TIMES DAILY
Qty: 180 TAB | Refills: 1 | Status: SHIPPED | OUTPATIENT
Start: 2020-02-17 | End: 2020-08-22

## 2020-02-17 NOTE — PROGRESS NOTES
HPI  Ravi Ohara 61 y.o. female  presents to the office today for follow up on chronic conditions. Blood pressure 130/74, pulse 78, temperature 97.4 °F (36.3 °C), temperature source Oral, resp. rate 18, height 5' 3\" (1.6 m), weight 235 lb (106.6 kg), last menstrual period 06/30/2013, SpO2 98 %. Body mass index is 41.63 kg/m². Chief Complaint   Patient presents with    Follow-up    Diabetes    Hypertension      DM2: A1c today 7.8, increased from A1c 7.2 on 11/13/19. Pt is currently on Actos 30 mg/d, Metformin  mg/d, and Victoza injection. Pt notes that she recently enrolled in a program called [employer] through work since January; notes that it helps motivating her to walk more. States that her BG at home ranges between 120-160, with the higher numbers usually after she wakes up from sleeping all night. Pt wonders why, and states that she has been trying to eat dinner around 730 instead of 9pm. Pt also endorses that she brings her own food to work, avoids sugary food, eats a lot of veggies, egg white, and turkey sausage. Pt states that she eats 2 meals per day. Hypertension: BP at office today 140/47. Pt continues with Avalide 150-12.5 mg/d. Pt states that her BP at home is usually around 130/74. Admits that she has not taken her BP medications prior to this encounter and states that she usually takes her meds during lunch time. Hyperlipidemia: Lipid panel on 7/29/19 notable for total cholesterol 166, HDL 69, LDL 89, and triglycerides 39. Pt continues with Atorvastatin 20 mg/d. Obesity: I have reviewed/discussed the above normal BMI with the patient. Health Maintenance: Pt's last colonoscopy was at [facility] in March 2011 by GI Dr. Brooks Schuler. Pt inquires about Omega XL for her joints. Discussed with pt that there should be no harm to take this supplement.      Current Outpatient Medications   Medication Sig Dispense Refill    metFORMIN ER (GLUCOPHAGE XR) 750 mg tablet Take 1 Tab by mouth two (2) times a day. TAKE 1 TABLET BY MOUTH EVERY  Tab 1    atorvastatin (LIPITOR) 20 mg tablet TAKE 1 TABLET BY MOUTH EVERY DAY 90 Tab 1    irbesartan-hydroCHLOROthiazide (AVALIDE) 150-12.5 mg per tablet TAKE 1 TABLET BY MOUTH EVERY DAY 90 Tab 1    Insulin Needles, Disposable, 32 gauge x 1/4\" ndle USE AS DIRECTED ONCE DAILY 100 Pen Needle 3    pioglitazone (ACTOS) 30 mg tablet TAKE 1 TABLET BY MOUTH EVERY DAY 90 Tab 0    VICTOZA 3-MAYLIN 0.6 mg/0.1 mL (18 mg/3 mL) pnij INJECT 1.8 MG BY SUBCUTANEOUS ROUTE DAILY. 1 Adjustable Dose Pre-filled Pen Syringe 5    glucose blood VI test strips (ACCU-CHEK OSCAR PLUS TEST STRP) strip CHECK BLOOD SUGAR DAILY DX:E11.9 150 Strip 1    ergocalciferol (ERGOCALCIFEROL) 50,000 unit capsule TAKE ONE CAPSULE BY MOUTH ONE TIME PER WEEK 12 Cap 0    Blood-Glucose Meter (ACCU-CHEK OSCAR PLUS METER) misc Check blood sugar daily DX:E11.9 1 Each 0    ONETOUCH ULTRA BLUE TEST STRIP strip USE DAILY AS DIRECTED 100 Strip 1    atovaquone-proguanil (MALARONE) 250-100 mg per tablet Take 2 days prior to exposure and 7 days after exposure 25 Tab 0     Allergies   Allergen Reactions    Pcn [Penicillins] Rash     Past Medical History:   Diagnosis Date    Diabetes ([facility] [address]     Hypercholesterolemia     Hypertension      No past surgical history on file. Family History   Problem Relation Age of Onset    Diabetes Mother     Diabetes Father     Diabetes Maternal Aunt     Diabetes Maternal Grandmother     Diabetes Maternal Grandfather     Diabetes Paternal Grandmother     Diabetes Paternal Grandfather      Social History     Tobacco Use    Smoking status: Never Smoker    Smokeless tobacco: Never Used   Substance Use Topics    Alcohol use: No        Review of Systems   Constitutional: Negative for chills and fever. HENT: Negative for hearing loss and tinnitus. Eyes: Negative for blurred vision and double vision. Respiratory: Negative for cough and shortness of breath. Cardiovascular: Negative for chest pain and palpitations. Gastrointestinal: Negative for nausea and vomiting. Genitourinary: Negative for dysuria and frequency. Musculoskeletal: Negative for back pain and falls. Skin: Negative for itching and rash. Neurological: Negative for dizziness, loss of consciousness and headaches. Psychiatric/Behavioral: Negative for depression. The patient is not nervous/anxious. Physical Exam  Vitals signs and nursing note reviewed. Constitutional:       Appearance: Normal appearance. She is well-developed. HENT:      Head: Normocephalic and atraumatic. Right Ear: External ear normal.      Left Ear: External ear normal.      Nose: Nose normal.   Eyes:      Conjunctiva/sclera: Conjunctivae normal.      Pupils: Pupils are equal, round, and reactive to light. Neck:      Musculoskeletal: Normal range of motion and neck supple. Cardiovascular:      Rate and Rhythm: Normal rate and regular rhythm. Pulses: Normal pulses. Heart sounds: Normal heart sounds. Pulmonary:      Effort: Pulmonary effort is normal.      Breath sounds: Normal breath sounds. Abdominal:      General: Bowel sounds are normal.      Palpations: Abdomen is soft. Musculoskeletal: Normal range of motion. Skin:     General: Skin is warm and dry. Neurological:      Mental Status: She is alert and oriented to person, place, and time. Psychiatric:         Speech: Speech normal.         Behavior: Behavior normal.         Thought Content: Thought content normal.         Judgment: Judgment normal.           ASSESSMENT and PLAN  Diagnoses and all orders for this visit:    1. Uncontrolled type 2 diabetes mellitus with hyperglycemia (HCC)  A1c today 7.8. Discussed with pt that she would benefit from taking Metformin  mg BID, while continuing with Victoza injection and Actos 30 mg/d. Pt continues diet control and exercise.    -     AMB POC HEMOGLOBIN V1B  -     METABOLIC PANEL, COMPREHENSIVE  -     metFORMIN ER (GLUCOPHAGE XR) 750 mg tablet; Take 1 Tab by mouth two (2) times a day. TAKE 1 TABLET BY MOUTH EVERY DAY    2. Essential hypertension  BP is at goal today in office. Advised pt to continue with Avalide 150-12.5 mg/d. Advised pt to continue monitoring her BP at home. -     METABOLIC PANEL, COMPREHENSIVE    3. Hyperlipidemia with target LDL less than 100  Last LDL at goal. Pt continues with Atorvastatin 20 mg/d. -     METABOLIC PANEL, COMPREHENSIVE  -     LIPID PANEL    4. Obesity, Class III, BMI 40-49.9 (morbid obesity) ([facility] [address]  I have reviewed/discussed the above normal BMI with the patient. I have recommended the following interventions: dietary management education, guidance, and counseling, encourage exercise and monitor weight . 5. Colon cancer screening  Advised pt to follow up with GI for colonoscopy. -     REFERRAL TO GASTROENTEROLOGY    Follow-up and Dispositions    · Return in about 3 months (around 5/17/2020) for diabetes follow up. Medication risks/benefits/costs/interactions/alternatives discussed with patient. Advised patient to call back or return to office if symptoms worsen/change/persist.  If patient cannot reach us or should anything more severe/urgent arise he/she should proceed directly to the nearest emergency department. Discussed expected course/resolution/complications of diagnosis in detail with patient. Patient given a written after visit summary which includes diagnoses, current medications and vitals. Patient expressed understanding with the diagnosis and plan. Written by valencia Brown, as dictated by Webb Meigs, M.D.    9:07 AM - 9:22 AM    Total time spent with the patient 15 minutes, greater than 50% of time spent counseling patient.

## 2020-02-17 NOTE — PATIENT INSTRUCTIONS
Counting Carbohydrates: Care Instructions Your Care Instructions You don't have to eat special foods when you have diabetes. You just have to be careful to eat healthy foods. Carbohydrates (carbs) raise blood sugar higher and quicker than any other nutrient. Carbs are found in desserts, breads and cereals, and fruit. They're also in starchy vegetables. These include potatoes, corn, and grains such as rice and pasta. Carbs are also in milk and yogurt. The more carbs you eat at one time, the higher your blood sugar will rise. Spreading carbs all through the day helps keep your blood sugar levels within your target range. Counting carbs is one of the best ways to keep your blood sugar under control. If you use insulin, counting carbs helps you match the right amount of insulin to the number of grams of carbs in a meal. Then you can change your diet and insulin dose as needed. Testing your blood sugar several times a day can help you learn how carbs affect your blood sugar. A registered dietitian or certified diabetes educator can help you plan meals and snacks. Follow-up care is a key part of your treatment and safety. Be sure to make and go to all appointments, and call your doctor if you are having problems. It's also a good idea to know your test results and keep a list of the medicines you take. How can you care for yourself at home? Know your daily amount of carbohydrates Your daily amount depends on several things, such as your weight, how active you are, which diabetes medicines you take, and what your goals are for your blood sugar levels. A registered dietitian or certified diabetes educator can help you plan how many carbs to include in each meal and snack. For most adults, a guideline for the daily amount of carbs is: · 45 to 60 grams at each meal. That's about the same as 3 to 4 carbohydrate servings. · 15 to 20 grams at each snack. That's about the same as 1 carbohydrate serving. Count carbs Counting carbs lets you know how much rapid-acting insulin to take before you eat. If you use an insulin pump, you get a constant rate of insulin during the day. So the pump must be programmed at meals. This gives you extra insulin to cover the rise in blood sugar after meals. If you take insulin: 
· Learn your own insulin-to-carb ratio. You and your diabetes health professional will figure out the ratio. You can do this by testing your blood sugar after meals. For example, you may need a certain amount of insulin for every 15 grams of carbs. · Add up the carb grams in a meal. Then you can figure out how many units of insulin to take based on your insulin-to-carb ratio. · Exercise lowers blood sugar. You can use less insulin than you would if you were not doing exercise. Keep in mind that timing matters. If you exercise within 1 hour after a meal, your body may need less insulin for that meal than it would if you exercised 3 hours after the meal. Test your blood sugar to find out how exercise affects your need for insulin. If you do or don't take insulin: 
· Look at labels on packaged foods. This can tell you how many carbs are in a serving. You can also use guides from the American Diabetes Association. · Be aware of portions, or serving sizes. If a package has two servings and you eat the whole package, you need to double the number of grams of carbohydrate listed for one serving. · Protein, fat, and fiber do not raise blood sugar as much as carbs do. If you eat a lot of these nutrients in a meal, your blood sugar will rise more slowly than it would otherwise. Eat from all food groups · Eat at least three meals a day. · Plan meals to include food from all the food groups. The food groups include grains, fruits, dairy, proteins, and vegetables. · Talk to your dietitian or diabetes educator about ways to add limited amounts of sweets into your meal plan. · If you drink alcohol, talk to your doctor. It may not be recommended when you are taking certain diabetes medicines. Where can you learn more? Go to http://nahomy-najma.info/. Enter I250 in the search box to learn more about \"Counting Carbohydrates: Care Instructions. \" Current as of: April 16, 2019 Content Version: 12.2 © 0569-7392 [employer]. Care instructions adapted under license by [employer] (which disclaims liability or warranty for this information). If you have questions about a medical condition or this instruction, always ask your healthcare professional. [place] [address] any warranty or liability for your use of this information.

## 2020-02-17 NOTE — PROGRESS NOTES
Khloe Turner is a 61 y.o. female      Chief Complaint   Patient presents with    Follow-up    Diabetes    Hypertension       1. Have you been to the ER, urgent care clinic since your last visit? Hospitalized since your last visit? No    2. Have you seen or consulted any other health care providers outside of the [address] [place] [address] since your last visit? Include any pap smears or colon screening.  No

## 2020-02-18 LAB
ALBUMIN SERPL-MCNC: 4.6 G/DL (ref 3.8–4.9)
ALBUMIN/GLOB SERPL: 1.7 {RATIO} (ref 1.2–2.2)
ALP SERPL-CCNC: 74 IU/L (ref 39–117)
ALT SERPL-CCNC: 18 IU/L (ref 0–32)
AST SERPL-CCNC: 18 IU/L (ref 0–40)
BILIRUB SERPL-MCNC: 0.3 MG/DL (ref 0–1.2)
BUN SERPL-MCNC: 18 MG/DL (ref 6–24)
BUN/CREAT SERPL: 16 (ref 9–23)
CALCIUM SERPL-MCNC: 9.9 MG/DL (ref 8.7–10.2)
CHLORIDE SERPL-SCNC: 100 MMOL/L (ref 96–106)
CHOLEST SERPL-MCNC: 182 MG/DL (ref 100–199)
CO2 SERPL-SCNC: 23 MMOL/L (ref 20–29)
CREAT SERPL-MCNC: 1.15 MG/DL (ref 0.57–1)
GLOBULIN SER CALC-MCNC: 2.7 G/DL (ref 1.5–4.5)
GLUCOSE SERPL-MCNC: 180 MG/DL (ref 65–99)
HDLC SERPL-MCNC: 72 MG/DL
INTERPRETATION, 910389: NORMAL
INTERPRETATION: NORMAL
LDLC SERPL CALC-MCNC: 102 MG/DL (ref 0–99)
PDF IMAGE, 910387: NORMAL
POTASSIUM SERPL-SCNC: 4.8 MMOL/L (ref 3.5–5.2)
PROT SERPL-MCNC: 7.3 G/DL (ref 6–8.5)
SODIUM SERPL-SCNC: 139 MMOL/L (ref 134–144)
TRIGL SERPL-MCNC: 38 MG/DL (ref 0–149)
VLDLC SERPL CALC-MCNC: 8 MG/DL (ref 5–40)

## 2020-03-01 NOTE — PROGRESS NOTES
Cholesterol is not at goal  Its gone up, are you taking the Lipitor as advised? If not please take as advised  If you are taking it then focus a bit more on diet, try that prior to dosage increase on meds. You were fasting thus the renal function is elevated  Please make sure you are well hydrated  Recheck  Renal function in 2 weeks  Please make sure you are well hydrated.     Any questions let me know

## 2020-03-05 DIAGNOSIS — R79.89 ELEVATED SERUM CREATININE: Primary | ICD-10-CM

## 2020-04-02 DIAGNOSIS — E11.9 CONTROLLED TYPE 2 DIABETES MELLITUS WITHOUT COMPLICATION, WITHOUT LONG-TERM CURRENT USE OF INSULIN (HCC): ICD-10-CM

## 2020-04-02 NOTE — TELEPHONE ENCOUNTER
Last visit 02/17/2020 MD Cong Vásquez   Next appointment 05/20/2020 MD Peres   Previous refill encounter(s) 01/09/2020 [facility] #90     Requested Prescriptions     Pending Prescriptions Disp Refills    pioglitazone (ACTOS) 30 mg tablet 90 Tab 0     Sig: Take 1 Tab by mouth daily.

## 2020-04-03 RX ORDER — PIOGLITAZONEHYDROCHLORIDE 30 MG/1
30 TABLET ORAL DAILY
Qty: 90 TAB | Refills: 0 | Status: SHIPPED | OUTPATIENT
Start: 2020-04-03 | End: 2020-07-05

## 2020-04-21 DIAGNOSIS — E55.9 VITAMIN D DEFICIENCY: ICD-10-CM

## 2020-04-21 RX ORDER — ERGOCALCIFEROL 1.25 MG/1
50000 CAPSULE ORAL
Qty: 12 CAP | Refills: 0 | Status: SHIPPED | OUTPATIENT
Start: 2020-04-21 | End: 2020-07-05

## 2020-04-21 NOTE — TELEPHONE ENCOUNTER
Last Visit: 2/17/20  MD Peres  Next Appointment: 5/20/20  MD Peres  Previous Refill Encounter(s): 11/13/19  #12    Requested Prescriptions     Pending Prescriptions Disp Refills    ergocalciferol (ERGOCALCIFEROL) 1,250 mcg (50,000 unit) capsule 12 Cap 0     Sig: Take 1 Cap by mouth every seven (7) days.

## 2020-05-20 ENCOUNTER — VIRTUAL VISIT (OUTPATIENT)
Dept: FAMILY MEDICINE CLINIC | Age: 60
End: 2020-05-20

## 2020-05-20 VITALS — SYSTOLIC BLOOD PRESSURE: 110 MMHG | DIASTOLIC BLOOD PRESSURE: 60 MMHG

## 2020-05-20 DIAGNOSIS — E78.5 HYPERLIPIDEMIA WITH TARGET LDL LESS THAN 100: ICD-10-CM

## 2020-05-20 DIAGNOSIS — I10 ESSENTIAL HYPERTENSION: ICD-10-CM

## 2020-05-20 DIAGNOSIS — E11.9 CONTROLLED TYPE 2 DIABETES MELLITUS WITHOUT COMPLICATION, WITHOUT LONG-TERM CURRENT USE OF INSULIN (HCC): Primary | ICD-10-CM

## 2020-05-20 DIAGNOSIS — G47.9 SLEEP DISTURBANCE: ICD-10-CM

## 2020-05-20 DIAGNOSIS — E66.01 OBESITY, CLASS III, BMI 40-49.9 (MORBID OBESITY) (HCC): ICD-10-CM

## 2020-05-20 NOTE — PROGRESS NOTES
Giovanni Lu is a 61 y.o. female who was seen by synchronous (real-time) audio-video technology on 5/20/2020. Consent:  Services were provided through a video synchronous discussion virtually to substitute for in-person appointment. She and/or her healthcare decision maker is aware that this patient-initiated Telehealth encounter is a billable service, with coverage as determined by her insurance carrier. She is aware that she may receive a bill and has provided verbal consent to proceed: Yes    I was in the office while conducting this encounter. Subjective:   Giovanni Lu was seen for follow up of chronic conditions. Hypertension: BP was 108/75 today at home. Pt continues with avalide 150-12.5mg/d. Pt notes that bp readings have been low, and reports her lowest reading was around 90/60 at one instance. Notes that bp has been below 120/80 frequently. Reports that pt and her family had grilled last Monday, and admits that she has had intermittent nausea, dizziness and vomiting since last Tuesday. Pt suspects sxs are from low bp readings. Notes that she vomited recently, yesterday while in a car when she had only consumed coffee that morning. Pt reports her bp is 108/75 this morning. Advised to take half of tablet for a week. Advised to let clinic or me know if sxs worsen or don't improve after one week. DM2: Pt reports that sugar levels were 127 at home, and that sugar levels average in the range of 140-160. A1c was 7.8 on 2/17/2020. Pt continues with glucophage  mg/BID    Hyperlipidemia: Lipid panel on 2/17/2020 notable for total cholesterol 182, HDL 72, , and triglycerides 38. Pt continues with lipitor 20mg/d      Prior to Admission medications    Medication Sig Start Date End Date Taking? Authorizing Provider   ergocalciferol (ERGOCALCIFEROL) 1,250 mcg (50,000 unit) capsule Take 1 Cap by mouth every seven (7) days.  4/21/20  Yes Teresa Rodriguez MD   pioglitazone (ACTOS) 30 mg tablet Take 1 Tab by mouth daily. 4/3/20  Yes Dinah Campos MD   metFORMIN ER (GLUCOPHAGE XR) 750 mg tablet Take 1 Tab by mouth two (2) times a day. TAKE 1 TABLET BY MOUTH EVERY DAY 2/17/20  Yes Dinah Campos MD   atorvastatin (LIPITOR) 20 mg tablet TAKE 1 TABLET BY MOUTH EVERY DAY 1/28/20  Yes Dinah Campos MD   irbesartan-hydroCHLOROthiazide (AVALIDE) 150-12.5 mg per tablet TAKE 1 TABLET BY MOUTH EVERY DAY 1/23/20  Yes Dinah Campos MD   Insulin Needles, Disposable, 32 gauge x 1/4\" ndle USE AS DIRECTED ONCE DAILY 1/21/20  Yes Dinah Campos MD   VICTOZA 3-MAYLIN 0.6 mg/0.1 mL (18 mg/3 mL) pnij INJECT 1.8 MG BY SUBCUTANEOUS ROUTE DAILY. 12/23/19  Yes Dinah Campos MD   glucose blood VI test strips (ACCU-CHEK OSCAR PLUS TEST STRP) strip CHECK BLOOD SUGAR DAILY DX:E11.9 12/17/19  Yes Dinah Campos MD   atovaquone-proguanil (MALARONE) 250-100 mg per tablet Take 2 days prior to exposure and 7 days after exposure 11/13/19  Yes Dinah Campos MD   Blood-Glucose Meter (ACCU-CHEK OSCAR PLUS METER) misc Check blood sugar daily DX:E11.9 2/4/19  Yes Dinah Campos MD   ONETOUCH ULTRA BLUE TEST STRIP strip USE DAILY AS DIRECTED 12/5/18  Yes Dinah Campos MD     Allergies   Allergen Reactions    Pcn [Penicillins] Rash     Past Medical History:   Diagnosis Date    Diabetes ([facility] [address]     Hypercholesterolemia     Hypertension      No past surgical history on file. Family History   Problem Relation Age of Onset    Diabetes Mother     Diabetes Father     Diabetes Maternal Aunt     Diabetes Maternal Grandmother     Diabetes Maternal Grandfather     Diabetes Paternal Grandmother     Diabetes Paternal Grandfather      Social History     Tobacco Use    Smoking status: Never Smoker    Smokeless tobacco: Never Used   Substance Use Topics    Alcohol use: No        Review of Systems   Constitutional: Negative for chills and fever. HENT: Negative for hearing loss and tinnitus.     Eyes: Negative for blurred vision and double vision. Respiratory: Negative for cough and shortness of breath. Cardiovascular: Negative for chest pain and palpitations. Gastrointestinal: Positive for nausea and vomiting. Genitourinary: Negative for dysuria and frequency. Musculoskeletal: Negative for back pain and falls. Skin: Negative for itching and rash. Neurological: Positive for dizziness. Negative for loss of consciousness and headaches. Endo/Heme/Allergies: Negative. Psychiatric/Behavioral: Negative for depression. The patient is not nervous/anxious.         PHYSICAL EXAMINATION:  Vital Signs: (As obtained by patient/caregiver at home)  Visit Vitals  LMP 06/30/2013        Constitutional: [x] Appears well-developed and well-nourished [x] No apparent distress      [] Abnormal -     Mental status: [x] Alert and awake  [x] Oriented to person/place/time [x] Able to follow commands    [] Abnormal -     Eyes:   EOM    [x]  Normal    [] Abnormal -   Sclera  [x]  Normal    [] Abnormal -          Discharge [x]  None visible   [] Abnormal -     HENT: [x] Normocephalic, atraumatic  [] Abnormal -   [x] Mouth/Throat: Mucous membranes are moist    External Ears [x] Normal  [] Abnormal -    Neck: [x] No visualized mass [] Abnormal -     Pulmonary/Chest: [x] Respiratory effort normal   [x] No visualized signs of difficulty breathing or respiratory distress        [] Abnormal -      Musculoskeletal:   [x] Normal gait with no signs of ataxia         [x] Normal range of motion of neck        [] Abnormal -     Neurological:        [x] No Facial Asymmetry (Cranial nerve 7 motor function) (limited exam due to video visit)          [x] No gaze palsy        [] Abnormal -          Skin:        [x] No significant exanthematous lesions or discoloration noted on facial skin         [] Abnormal -            Psychiatric:       [x] Normal Affect [] Abnormal -        [x] No Hallucinations    Other pertinent observable physical exam findings:-    Assessment & Plan:   Diagnoses and all orders for this visit:    1. Controlled type 2 diabetes mellitus without complication, without long-term current use of insulin (HCC)  Presumed stable. Last a1c was 7.8. Advised to continue with glucophage  mg/BID. Labwork ordered and will be completed when pt schedules appointment to come into clinic. Advised pt to follow proper safety precautions against COVID-19 when coming to clinic  -     METABOLIC PANEL, COMPREHENSIVE  -     MICROALBUMIN, UR, RAND W/ MICROALB/CREAT RATIO  -     HEMOGLOBIN A1C WITH EAG    2. Essential hypertension  Presumed stable. BP was 108/75 today at home. Advised pt to take 0.5 tablet, instead of whole tablet of avalide 150-12.5mg/d due to pt's low bp readings. Also advised to let clinic or me know if sxs worsen or don't improve after one week. -     LIPID PANEL    3. Obesity, Class III, BMI 40-49.9 (morbid obesity) ([facility] [address]  I have reviewed/discussed the above normal BMI with the patient. I have recommended the following interventions: dietary management education, guidance, and counseling, encourage exercise and monitor weight . 4. Sleep disturbance  Advised to continue to monitor sx. 5. Hyperlipidemia with target LDL less than 100  Presumed stable. LDL not at goal, with . Advised to continue with lipitor 20mg/d. Labwork ordered and will be completed when pt schedules appointment to come into clinic. Advised pt to follow proper safety precautions against COVID-19 when coming to clinic  -     LIPID PANEL     Follow-up and Dispositions    · Return in about 3 months (around 8/20/2020). We discussed the expected course, resolution and complications of the diagnosis(es) in detail. Medication risks, benefits, costs, interactions, and alternatives were discussed as indicated. I advised her to contact the office if her condition worsens, changes or fails to improve as anticipated.  She expressed understanding with the diagnosis(es) and plan. Pursuant to the emergency declaration under the [address] and [facility] [address] waiver authority and the August [employer] Act, this Virtual Visit was conducted, with patient's consent, to reduce the patient's risk of exposure to COVID-19 and provide continuity of care for an established patient. Services were provided through a video synchronous discussion virtually to substitute for in-person clinic visit. Written by robert Rivera, as dictated by Ivis Pillai M.D.    8:58 AM - 9:08 PM  Total time spent with the patient 10 minutes, greater than 50% of time spent counseling patient.

## 2020-07-04 DIAGNOSIS — I10 ESSENTIAL HYPERTENSION: ICD-10-CM

## 2020-07-04 DIAGNOSIS — E11.9 CONTROLLED TYPE 2 DIABETES MELLITUS WITHOUT COMPLICATION, WITHOUT LONG-TERM CURRENT USE OF INSULIN (HCC): ICD-10-CM

## 2020-07-04 DIAGNOSIS — E55.9 VITAMIN D DEFICIENCY: ICD-10-CM

## 2020-07-05 RX ORDER — PIOGLITAZONEHYDROCHLORIDE 30 MG/1
TABLET ORAL
Qty: 90 TAB | Refills: 0 | Status: SHIPPED | OUTPATIENT
Start: 2020-07-05 | End: 2020-10-04

## 2020-07-05 RX ORDER — IRBESARTAN AND HYDROCHLOROTHIAZIDE 150; 12.5 MG/1; MG/1
TABLET, FILM COATED ORAL
Qty: 90 TAB | Refills: 1 | Status: SHIPPED | OUTPATIENT
Start: 2020-07-05 | End: 2021-01-24

## 2020-07-05 RX ORDER — ERGOCALCIFEROL 1.25 MG/1
CAPSULE ORAL
Qty: 12 CAP | Refills: 0 | Status: SHIPPED | OUTPATIENT
Start: 2020-07-05 | End: 2020-10-25

## 2020-08-13 DIAGNOSIS — E11.9 CONTROLLED TYPE 2 DIABETES MELLITUS WITHOUT COMPLICATION, WITHOUT LONG-TERM CURRENT USE OF INSULIN (HCC): ICD-10-CM

## 2020-08-13 NOTE — TELEPHONE ENCOUNTER
Last Visit: VV 5/20/20  MD Peres  Next Appointment: 8/19/20  MD Peres  Previous Refill Encounter(s): 12/23/19  1 + 5    Requested Prescriptions     Pending Prescriptions Disp Refills    liraglutide (Victoza 3-Darryn) 0.6 mg/0.1 mL (18 mg/3 mL) pnij 1 Adjustable Dose Pre-filled Pen Syringe 5     Sig: INJECT 1.8 MG BY SUBCUTANEOUS ROUTE DAILY.

## 2020-08-16 RX ORDER — LIRAGLUTIDE 6 MG/ML
INJECTION SUBCUTANEOUS
Qty: 1 ADJUSTABLE DOSE PRE-FILLED PEN SYRINGE | Refills: 5 | Status: SHIPPED | OUTPATIENT
Start: 2020-08-16 | End: 2020-12-15

## 2020-08-20 DIAGNOSIS — E78.5 HYPERLIPIDEMIA WITH TARGET LDL LESS THAN 100: ICD-10-CM

## 2020-08-20 RX ORDER — ATORVASTATIN CALCIUM 20 MG/1
20 TABLET, FILM COATED ORAL DAILY
Qty: 90 TAB | Refills: 1 | Status: SHIPPED | OUTPATIENT
Start: 2020-08-20 | End: 2021-02-14

## 2020-08-20 NOTE — TELEPHONE ENCOUNTER
Last Visit: VV 5/20/20  MD Peres  Next Appointment: 9/1/20  MD Peres  Previous Refill Encounter(s): 1/28/20  90 + 1    Requested Prescriptions     Pending Prescriptions Disp Refills    atorvastatin (LIPITOR) 20 mg tablet 90 Tab 1     Sig: Take 1 Tab by mouth daily.

## 2020-08-22 DIAGNOSIS — E11.65 UNCONTROLLED TYPE 2 DIABETES MELLITUS WITH HYPERGLYCEMIA (HCC): ICD-10-CM

## 2020-08-22 RX ORDER — METFORMIN HYDROCHLORIDE 750 MG/1
TABLET, EXTENDED RELEASE ORAL
Qty: 180 TAB | Refills: 1 | Status: SHIPPED | OUTPATIENT
Start: 2020-08-22 | End: 2021-03-23

## 2020-09-01 ENCOUNTER — OFFICE VISIT (OUTPATIENT)
Dept: FAMILY MEDICINE CLINIC | Age: 60
End: 2020-09-01

## 2020-09-01 VITALS
WEIGHT: 213.4 LBS | HEART RATE: 73 BPM | TEMPERATURE: 98.2 F | DIASTOLIC BLOOD PRESSURE: 66 MMHG | BODY MASS INDEX: 37.81 KG/M2 | RESPIRATION RATE: 16 BRPM | SYSTOLIC BLOOD PRESSURE: 119 MMHG | HEIGHT: 63 IN | OXYGEN SATURATION: 99 %

## 2020-09-01 DIAGNOSIS — E11.9 CONTROLLED TYPE 2 DIABETES MELLITUS WITHOUT COMPLICATION, WITHOUT LONG-TERM CURRENT USE OF INSULIN (HCC): Primary | ICD-10-CM

## 2020-09-01 DIAGNOSIS — R23.2 HOT FLASHES: ICD-10-CM

## 2020-09-01 DIAGNOSIS — E78.5 HYPERLIPIDEMIA WITH TARGET LDL LESS THAN 100: ICD-10-CM

## 2020-09-01 DIAGNOSIS — I10 ESSENTIAL HYPERTENSION: ICD-10-CM

## 2020-09-01 LAB — HBA1C MFR BLD HPLC: 6.2 %

## 2020-09-01 PROCEDURE — 83036 HEMOGLOBIN GLYCOSYLATED A1C: CPT | Performed by: FAMILY MEDICINE

## 2020-09-01 PROCEDURE — 99214 OFFICE O/P EST MOD 30 MIN: CPT | Performed by: FAMILY MEDICINE

## 2020-09-01 RX ORDER — GABAPENTIN 100 MG/1
100 CAPSULE ORAL
Qty: 30 CAP | Refills: 2 | Status: SHIPPED | OUTPATIENT
Start: 2020-09-01 | End: 2021-05-17

## 2020-09-01 RX ORDER — GABAPENTIN 300 MG/1
300 CAPSULE ORAL DAILY
COMMUNITY
End: 2020-09-01 | Stop reason: DRUGHIGH

## 2020-09-01 NOTE — PROGRESS NOTES
Chief Complaint   Patient presents with    Follow-up     3 months     3 most recent PHQ Screens 9/1/2020   Little interest or pleasure in doing things Not at all   Feeling down, depressed, irritable, or hopeless Not at all   Total Score PHQ 2 0     Abuse Screening Questionnaire 9/1/2020   Do you ever feel afraid of your partner? N   Are you in a relationship with someone who physically or mentally threatens you? N   Is it safe for you to go home? Y     Visit Vitals  /66 (BP 1 Location: Left arm, BP Patient Position: Sitting)   Pulse 73   Temp 98.2 °F (36.8 °C) (Oral)   Resp 16   Ht 5' 3\" (1.6 m)   Wt 213 lb 6.4 oz (96.8 kg)   SpO2 99%   BMI 37.80 kg/m²     1. Have you been to the ER, urgent care clinic since your last visit? Hospitalized since your last visit?no    2. Have you seen or consulted any other health care providers outside of the [address] [place] [address] since your last visit? Include any pap smears or colon screening.  no

## 2020-09-01 NOTE — PROGRESS NOTES
VIKASH Erazo 61 y.o. female  presents to the office today for a three month follow-up of diabetes. Blood pressure 119/66, pulse 73, temperature 98.2 °F (36.8 °C), temperature source Oral, resp. rate 16, height 5' 3\" (1.6 m), weight 213 lb 6.4 oz (96.8 kg), last menstrual period 06/30/2013, SpO2 99 %. Body mass index is 37.8 kg/m². Chief Complaint   Patient presents with    Follow-up     3 months        DM2: Pt's A1c today is 6.2; a decrease  from pt's last A1c value of 7.8 on 2/17/2020. Pt continues with Metformin  mg/day, daily Victoza injections, and insulin treatment. I performed a foot exam during today's OV; all findings are normal.     Hyperlipidemia: Lipid panel on 2/17/2020 notable for total cholesterol 182, HDL 72, , and triglycerides 38. Pt continues with Lipitor 20 mg/day. Hypertension: BP at office today 119/66. Pt continues with Actos 30 mg/day and Avalide 150-12.5 mg/day. Vitamin D deficiency: Pt's vitamin D levels were 14.5 on 7/29/2019. Pt continues with Actos ergocalciferol. Obesity: I have reviewed/discussed the above normal BMI with the patient. Pt states that she has lost 15 lbs in the past three months. Health maintenance: Pt informs me that she had a colonoscopy performed in June of this year under the care of Dr. Mariah Abdalla. I will ask my nurse to retrieve the results from his office. Pt states that she is up to date on her mammogram and pap smear. I have asked my nurse to retrieve these files from pt's OBGYN so that I can add them to pt's record. Pt is due for updated labs; I have placed orders for pt to have labwork performed at a local [facility].    Pt states that her hot flashes have been increasing in severity and she had a pap smear recently under the care of her OBGYN.  She was prescribed gabapentin 300 mg/day to help with her hot flashes; pt reports that she sleeps through the night, she does not experience hot flashes anymore, but she is \"groggy, foggy, and disoriented\". She states that she has also been experiencing increased feelings of anxiety since she began the medication. I have decreased her dosage to gabapentin 100 mg/day. Current Outpatient Medications   Medication Sig Dispense Refill    gabapentin (NEURONTIN) 100 mg capsule Take 1 Cap by mouth nightly. Max Daily Amount: 100 mg. 30 Cap 2    metFORMIN ER (GLUCOPHAGE XR) 750 mg tablet TAKE 1 TABLET BY MOUTH TWICE A DAY (Patient taking differently: daily. ) 180 Tab 1    atorvastatin (LIPITOR) 20 mg tablet Take 1 Tab by mouth daily. 90 Tab 1    liraglutide (Victoza 3-Darryn) 0.6 mg/0.1 mL (18 mg/3 mL) pnij INJECT 1.8 MG BY SUBCUTANEOUS ROUTE DAILY. 1 Adjustable Dose Pre-filled Pen Syringe 5    pioglitazone (ACTOS) 30 mg tablet TAKE 1 TABLET BY MOUTH EVERY DAY 90 Tab 0    ergocalciferol (ERGOCALCIFEROL) 1,250 mcg (50,000 unit) capsule TAKE 1 CAPSULE BY MOUTH ONE TIME PER WEEK 12 Cap 0    irbesartan-hydroCHLOROthiazide (AVALIDE) 150-12.5 mg per tablet TAKE 1 TABLET BY MOUTH EVERY DAY 90 Tab 1    Insulin Needles, Disposable, 32 gauge x 1/4\" ndle USE AS DIRECTED ONCE DAILY 100 Pen Needle 3    glucose blood VI test strips (ACCU-CHEK OSCAR PLUS TEST STRP) strip CHECK BLOOD SUGAR DAILY DX:E11.9 150 Strip 1    Blood-Glucose Meter (ACCU-CHEK OSCAR PLUS METER) misc Check blood sugar daily DX:E11.9 1 Each 0    ONETOUCH ULTRA BLUE TEST STRIP strip USE DAILY AS DIRECTED 100 Strip 1    atovaquone-proguanil (MALARONE) 250-100 mg per tablet Take 2 days prior to exposure and 7 days after exposure 25 Tab 0     Allergies   Allergen Reactions    Pcn [Penicillins] Rash     Past Medical History:   Diagnosis Date    Diabetes ([facility] [address]     Hypercholesterolemia     Hypertension      No past surgical history on file.   Family History   Problem Relation Age of Onset    Diabetes Mother     Diabetes Father     Diabetes Maternal Aunt     Diabetes Maternal Grandmother     Diabetes Maternal Grandfather     Diabetes Paternal Grandmother     Diabetes Paternal Grandfather      Social History     Tobacco Use    Smoking status: Never Smoker    Smokeless tobacco: Never Used   Substance Use Topics    Alcohol use: No        Review of Systems   Constitutional: Negative for chills and fever. HENT: Negative for hearing loss and tinnitus. Eyes: Negative for blurred vision and double vision. Respiratory: Negative for cough and shortness of breath. Cardiovascular: Negative for chest pain and palpitations. Gastrointestinal: Negative for nausea and vomiting. Genitourinary: Negative for dysuria and frequency. Musculoskeletal: Negative for back pain and falls. Skin: Negative for itching and rash. Neurological: Negative for dizziness, loss of consciousness and headaches. Endo/Heme/Allergies: Negative. Psychiatric/Behavioral: Negative for depression. The patient is not nervous/anxious. Physical Exam  Constitutional:       Appearance: Normal appearance. HENT:      Head: Normocephalic and atraumatic. Right Ear: Tympanic membrane, ear canal and external ear normal.      Left Ear: Tympanic membrane, ear canal and external ear normal.      Nose: Nose normal.      Mouth/Throat:      Mouth: Mucous membranes are moist.      Pharynx: Oropharynx is clear. Eyes:      Extraocular Movements: Extraocular movements intact. Conjunctiva/sclera: Conjunctivae normal.      Pupils: Pupils are equal, round, and reactive to light. Cardiovascular:      Rate and Rhythm: Normal rate and regular rhythm. Pulses: Normal pulses. Heart sounds: Normal heart sounds. Pulmonary:      Effort: Pulmonary effort is normal.      Breath sounds: Normal breath sounds. Abdominal:      General: Abdomen is flat. Bowel sounds are normal.      Palpations: Abdomen is soft. Genitourinary:     General: Normal vulva. Rectum: Normal.   Musculoskeletal: Normal range of motion.    Feet:      Comments: Diabetic foot exam:     Left Foot:   Visual Exam: normal    Pulse DP: 2+ (normal)   Filament test: normal sensation    Vibratory sensation: normal      Right Foot:   Visual Exam: normal    Pulse DP: 2+ (normal)   Filament test: normal sensation    Vibratory sensation: normal    Skin:     General: Skin is warm and dry. Neurological:      General: No focal deficit present. Mental Status: She is alert and oriented to person, place, and time. Mental status is at baseline. Psychiatric:         Mood and Affect: Mood normal.         Behavior: Behavior normal.         Judgment: Judgment normal.           ASSESSMENT and PLAN  Diagnoses and all orders for this visit:    1. Controlled type 2 diabetes mellitus without complication, without long-term current use of insulin ([facility])  Pt's A1c today is 6.2; a decrease  from pt's last A1c value of 7.8 on 2/17/2020. Pt continues with Metformin  mg/day, daily Victoza injections, and insulin treatment. I performed a foot exam during today's OV; all findings are normal.   -     MICROALBUMIN, UR, RAND W/ MICROALB/CREAT RATIO; Future  -     AMB POC HEMOGLOBIN A1C    2. Essential hypertension  BP at office today 119/66. Pt continues with Actos 30 mg/day and Avalide 150-12.5 mg/day. -     METABOLIC PANEL, COMPREHENSIVE; Future    3. Hyperlipidemia with target LDL less than 100  Lipid panel on 2/17/2020 notable for total cholesterol 182, HDL 72, , and triglycerides 38. LDL is not at goal. Pt continues with Lipitor 20 mg/day. -     LIPID PANEL; Future    4. Hot flashes  Pt states that her hot flashes have been increasing in severity. Her OBGYN prescribed gabapentin 300 mg/day to help with her hot flashes; pt reports the medication makes her feel \"groggy, foggy, and disoriented\". She states that she has also been experiencing increased feelings of anxiety since she began the medication. I have decreased her dosage to gabapentin 100 mg/day. -     gabapentin (NEURONTIN) 100 mg capsule;  Take 1 Cap by mouth nightly. Max Daily Amount: 100 mg. Medication risks/benefits/costs/interactions/alternatives discussed with patient. Advised patient to call back or return to office if symptoms worsen/change/persist.  If patient cannot reach us or should anything more severe/urgent arise he/she should proceed directly to the nearest emergency department. Discussed expected course/resolution/complications of diagnosis in detail with patient. Patient given a written after visit summary which includes diagnoses, current medications and vitals. Patient expressed understanding with the diagnosis and plan. Written by valencia Gramajo, as dictated by Jero Brownlee M.D.    10:26 AM - 10:47 AM    Total time spent with the patient 21 minutes, greater than 50% of time spent counseling patient.

## 2020-09-01 NOTE — PATIENT INSTRUCTIONS
Counting Carbohydrates: Care Instructions Your Care Instructions You don't have to eat special foods when you have diabetes. You just have to be careful to eat healthy foods. Carbohydrates (carbs) raise blood sugar higher and quicker than any other nutrient. Carbs are found in desserts, breads and cereals, and fruit. They're also in starchy vegetables. These include potatoes, corn, and grains such as rice and pasta. Carbs are also in milk and yogurt. The more carbs you eat at one time, the higher your blood sugar will rise. Spreading carbs all through the day helps keep your blood sugar levels within your target range. Counting carbs is one of the best ways to keep your blood sugar under control. If you use insulin, counting carbs helps you match the right amount of insulin to the number of grams of carbs in a meal. Then you can change your diet and insulin dose as needed. Testing your blood sugar several times a day can help you learn how carbs affect your blood sugar. A registered dietitian or certified diabetes educator can help you plan meals and snacks. Follow-up care is a key part of your treatment and safety. Be sure to make and go to all appointments, and call your doctor if you are having problems. It's also a good idea to know your test results and keep a list of the medicines you take. How can you care for yourself at home? Know your daily amount of carbohydrates Your daily amount depends on several things, such as your weight, how active you are, which diabetes medicines you take, and what your goals are for your blood sugar levels. A registered dietitian or certified diabetes educator can help you plan how many carbs to include in each meal and snack. For most adults, a guideline for the daily amount of carbs is: · 45 to 60 grams at each meal. That's about the same as 3 to 4 carbohydrate servings. · 15 to 20 grams at each snack. That's about the same as 1 carbohydrate serving. Count carbs Counting carbs lets you know how much rapid-acting insulin to take before you eat. If you use an insulin pump, you get a constant rate of insulin during the day. So the pump must be programmed at meals. This gives you extra insulin to cover the rise in blood sugar after meals. If you take insulin: 
· Learn your own insulin-to-carb ratio. You and your diabetes health professional will figure out the ratio. You can do this by testing your blood sugar after meals. For example, you may need a certain amount of insulin for every 15 grams of carbs. · Add up the carb grams in a meal. Then you can figure out how many units of insulin to take based on your insulin-to-carb ratio. · Exercise lowers blood sugar. You can use less insulin than you would if you were not doing exercise. Keep in mind that timing matters. If you exercise within 1 hour after a meal, your body may need less insulin for that meal than it would if you exercised 3 hours after the meal. Test your blood sugar to find out how exercise affects your need for insulin. If you do or don't take insulin: 
· Look at labels on packaged foods. This can tell you how many carbs are in a serving. You can also use guides from the American Diabetes Association. · Be aware of portions, or serving sizes. If a package has two servings and you eat the whole package, you need to double the number of grams of carbohydrate listed for one serving. · Protein, fat, and fiber do not raise blood sugar as much as carbs do. If you eat a lot of these nutrients in a meal, your blood sugar will rise more slowly than it would otherwise. Eat from all food groups · Eat at least three meals a day. · Plan meals to include food from all the food groups. The food groups include grains, fruits, dairy, proteins, and vegetables. · Talk to your dietitian or diabetes educator about ways to add limited amounts of sweets into your meal plan. · If you drink alcohol, talk to your doctor. It may not be recommended when you are taking certain diabetes medicines. Where can you learn more? Go to http://www.gray.com/ Enter J996 in the search box to learn more about \"Counting Carbohydrates: Care Instructions. \" Current as of: December 20, 2019               Content Version: 12.5 © 7077-5574 [email or web address]. Care instructions adapted under license by [employer] (which disclaims liability or warranty for this information). If you have questions about a medical condition or this instruction, always ask your healthcare professional. [place] [address] any warranty or liability for your use of this information.

## 2020-09-11 LAB
ALBUMIN SERPL-MCNC: 4.7 G/DL (ref 3.8–4.9)
ALBUMIN/GLOB SERPL: 1.7 {RATIO} (ref 1.2–2.2)
ALP SERPL-CCNC: 62 IU/L (ref 39–117)
ALT SERPL-CCNC: 10 IU/L (ref 0–32)
AST SERPL-CCNC: 13 IU/L (ref 0–40)
BILIRUB SERPL-MCNC: 0.4 MG/DL (ref 0–1.2)
BUN SERPL-MCNC: 19 MG/DL (ref 8–27)
BUN/CREAT SERPL: 17 (ref 12–28)
CALCIUM SERPL-MCNC: 9.9 MG/DL (ref 8.7–10.3)
CHLORIDE SERPL-SCNC: 101 MMOL/L (ref 96–106)
CHOLEST SERPL-MCNC: 166 MG/DL (ref 100–199)
CO2 SERPL-SCNC: 25 MMOL/L (ref 20–29)
CREAT SERPL-MCNC: 1.09 MG/DL (ref 0.57–1)
GLOBULIN SER CALC-MCNC: 2.8 G/DL (ref 1.5–4.5)
GLUCOSE SERPL-MCNC: 132 MG/DL (ref 65–99)
HDLC SERPL-MCNC: 56 MG/DL
INTERPRETATION, 910389: NORMAL
INTERPRETATION: NORMAL
LDLC SERPL CALC-MCNC: 98 MG/DL (ref 0–99)
PDF IMAGE, 910387: NORMAL
POTASSIUM SERPL-SCNC: 4.6 MMOL/L (ref 3.5–5.2)
PROT SERPL-MCNC: 7.5 G/DL (ref 6–8.5)
SODIUM SERPL-SCNC: 139 MMOL/L (ref 134–144)
TRIGL SERPL-MCNC: 58 MG/DL (ref 0–149)
VLDLC SERPL CALC-MCNC: 12 MG/DL (ref 5–40)

## 2020-09-26 NOTE — PROGRESS NOTES
Ms. Abigail Brennan,    Ms. Abigail Brennan for the most part your labs are looking really good I advised to continue your current regimen of diet exercise and the current medications. Please also be well-hydrated as much as possible.   If you should have any questions please let me know

## 2020-10-03 DIAGNOSIS — E11.9 CONTROLLED TYPE 2 DIABETES MELLITUS WITHOUT COMPLICATION, WITHOUT LONG-TERM CURRENT USE OF INSULIN (HCC): ICD-10-CM

## 2020-10-04 RX ORDER — PIOGLITAZONEHYDROCHLORIDE 30 MG/1
TABLET ORAL
Qty: 90 TAB | Refills: 0 | Status: SHIPPED | OUTPATIENT
Start: 2020-10-04 | End: 2021-01-05

## 2020-10-24 DIAGNOSIS — E55.9 VITAMIN D DEFICIENCY: ICD-10-CM

## 2020-10-25 RX ORDER — ERGOCALCIFEROL 1.25 MG/1
CAPSULE ORAL
Qty: 12 CAP | Refills: 0 | Status: SHIPPED | OUTPATIENT
Start: 2020-10-25 | End: 2021-01-24

## 2020-11-24 ENCOUNTER — VIRTUAL VISIT (OUTPATIENT)
Dept: FAMILY MEDICINE CLINIC | Age: 60
End: 2020-11-24
Payer: COMMERCIAL

## 2020-11-24 DIAGNOSIS — E78.5 HYPERLIPIDEMIA WITH TARGET LDL LESS THAN 100: ICD-10-CM

## 2020-11-24 DIAGNOSIS — E11.9 CONTROLLED TYPE 2 DIABETES MELLITUS WITHOUT COMPLICATION, WITHOUT LONG-TERM CURRENT USE OF INSULIN (HCC): ICD-10-CM

## 2020-11-24 DIAGNOSIS — I10 ESSENTIAL HYPERTENSION: ICD-10-CM

## 2020-11-24 DIAGNOSIS — R11.2 NAUSEA AND VOMITING, INTRACTABILITY OF VOMITING NOT SPECIFIED, UNSPECIFIED VOMITING TYPE: Primary | ICD-10-CM

## 2020-11-24 PROCEDURE — 99213 OFFICE O/P EST LOW 20 MIN: CPT | Performed by: FAMILY MEDICINE

## 2020-11-24 NOTE — PROGRESS NOTES
Cody Chaidez is a 61 y.o. female who was seen by synchronous (real-time) audio-video technology on 11/24/2020. Consent:  Services were provided through a video synchronous discussion virtually to substitute for in-person appointment. She and/or her healthcare decision maker is aware that this patient-initiated Telehealth encounter is a billable service, with coverage as determined by her insurance carrier. She is aware that she may receive a bill and has provided verbal consent to proceed: Yes    I was in the office while conducting this encounter. Subjective:   Cody Chaidez was seen for No chief complaint on file. Pt informs me that she woke up nauseous this morning. Her blood sugar value was 98 and she has been in the 90s and low 100s throughout the week. She informs me that she has been vomiting randomly and without pattern throughout the day. Her sxs began before her last visit un May. When asked, pt informs me that she has not changed any of her medications. Pt informs me that she now weighs 196 lbs. I advised pt to decrease her Victoza to 1.2 mg as these sxs are often side effects of the medication. Pt informs me she is up to date with her mammogram, pap smear, and colonoscopy. I have requested my nurse to reach out to these facilities for the results. Pt is due for updated labs; I have placed orders for them to have labwork performed at the [address] [place] [address] draw station in [place]. Prior to Admission medications    Medication Sig Start Date End Date Taking? Authorizing Provider   ergocalciferol (ERGOCALCIFEROL) 1,250 mcg (50,000 unit) capsule TAKE 1 CAPSULE BY MOUTH ONE TIME PER WEEK 10/25/20  Yes Shad Terry MD   pioglitazone (ACTOS) 30 mg tablet TAKE 1 TABLET BY MOUTH EVERY DAY 10/4/20  Yes Shad Terry MD   gabapentin (NEURONTIN) 100 mg capsule Take 1 Cap by mouth nightly.  Max Daily Amount: 100 mg. 9/1/20  Yes Shad Terry MD   metFORMIN ER (GLUCOPHAGE XR) 750 mg tablet TAKE 1 TABLET BY MOUTH TWICE A DAY  Patient taking differently: daily. 8/22/20  Yes Roney Ely MD   atorvastatin (LIPITOR) 20 mg tablet Take 1 Tab by mouth daily. 8/20/20  Yes Mariano Peres MD   liraglutide (Victoza 3-Darryn) 0.6 mg/0.1 mL (18 mg/3 mL) pnij INJECT 1.8 MG BY SUBCUTANEOUS ROUTE DAILY. 8/16/20  Yes Roney Ely MD   irbesartan-hydroCHLOROthiazide (AVALIDE) 150-12.5 mg per tablet TAKE 1 TABLET BY MOUTH EVERY DAY 7/5/20  Yes Roney Ely MD   Insulin Needles, Disposable, 32 gauge x 1/4\" ndle USE AS DIRECTED ONCE DAILY 1/21/20  Yes Roney Ely MD   glucose blood VI test strips (ACCU-CHEK OSCAR PLUS TEST STRP) strip CHECK BLOOD SUGAR DAILY DX:E11.9 12/17/19  Yes Roney Ely MD   Blood-Glucose Meter (ACCU-CHEK OSCAR PLUS METER) misc Check blood sugar daily DX:E11.9 2/4/19  Yes Glenn Peres MD   ONETOUCH ULTRA BLUE TEST STRIP strip USE DAILY AS DIRECTED 12/5/18  Yes Roney Ely MD     Allergies   Allergen Reactions    Pcn [Penicillins] Rash     Past Medical History:   Diagnosis Date    Diabetes ([facility] [address]     Hypercholesterolemia     Hypertension      No past surgical history on file. Family History   Problem Relation Age of Onset    Diabetes Mother     Diabetes Father     Diabetes Maternal Aunt     Diabetes Maternal Grandmother     Diabetes Maternal Grandfather     Diabetes Paternal Grandmother     Diabetes Paternal Grandfather      Social History     Tobacco Use    Smoking status: Never Smoker    Smokeless tobacco: Never Used   Substance Use Topics    Alcohol use: No        Review of Systems   Constitutional: Negative for chills and fever. HENT: Negative for hearing loss and tinnitus. Eyes: Negative for blurred vision and double vision. Respiratory: Negative for cough and shortness of breath. Cardiovascular: Negative for chest pain and palpitations. Gastrointestinal: Negative for nausea and vomiting.    Genitourinary: Negative for dysuria and frequency. Musculoskeletal: Negative for back pain and falls. Skin: Negative for itching and rash. Neurological: Negative for dizziness, loss of consciousness and headaches. Endo/Heme/Allergies: Negative. Psychiatric/Behavioral: Negative for depression. The patient is not nervous/anxious. PHYSICAL EXAMINATION:  Vital Signs: (As obtained by patient/caregiver at home)  Visit Vitals  LMP 06/30/2013        Constitutional: [x] Appears well-developed and well-nourished [x] No apparent distress      [] Abnormal -     Mental status: [x] Alert and awake  [x] Oriented to person/place/time [x] Able to follow commands    [] Abnormal -     Eyes:   EOM    [x]  Normal    [] Abnormal -   Sclera  [x]  Normal    [] Abnormal -          Discharge [x]  None visible   [] Abnormal -     HENT: [x] Normocephalic, atraumatic  [] Abnormal -   [x] Mouth/Throat: Mucous membranes are moist    External Ears [x] Normal  [] Abnormal -    Neck: [x] No visualized mass [] Abnormal -     Pulmonary/Chest: [x] Respiratory effort normal   [x] No visualized signs of difficulty breathing or respiratory distress        [] Abnormal -      Musculoskeletal:   [x] Normal gait with no signs of ataxia         [x] Normal range of motion of neck        [] Abnormal -     Neurological:        [x] No Facial Asymmetry (Cranial nerve 7 motor function) (limited exam due to video visit)          [x] No gaze palsy        [] Abnormal -          Skin:        [x] No significant exanthematous lesions or discoloration noted on facial skin         [] Abnormal -            Psychiatric:       [x] Normal Affect [] Abnormal -        [x] No Hallucinations    Other pertinent observable physical exam findings:-    Assessment & Plan:   Diagnoses and all orders for this visit:    1. Nausea and vomiting, intractability of vomiting not specified, unspecified vomiting type  Pt informs me that she woke up nauseous this morning.  Her blood sugar value was 98 and she has been in the 90s and low 100s throughout the week. She informs me that she has been vomiting randomly and without pattern throughout the day. Her sxs began before her last visit un May. When asked, pt informs me that she has not changed any of her medications. Pt informs me that she now weighs 196 lbs. I advised pt to decrease her Victoza to 1.2 mg as these sxs are often side effects of the medication. 2. Controlled type 2 diabetes mellitus without complication, without long-term current use of insulin ([facility] [address]  Refer to #1.   -     METABOLIC PANEL, COMPREHENSIVE; Future  -     MICROALBUMIN, UR, RAND W/ MICROALB/CREAT RATIO; Future  -     HEMOGLOBIN A1C WITH EAG; Future    3. Essential hypertension  Presumed stable. Pt has no complaints during today's OV. Pt is due for updated labs; I have placed orders for them to have labwork performed at the [facility] draw station in [place]. Pt continues with Avalide 150-12.5 mg/day. -     METABOLIC PANEL, COMPREHENSIVE; Future  -     CBC WITH AUTOMATED DIFF; Future    4. Hyperlipidemia with target LDL less than 100  Presumed stable. Pt has no complaints during today's OV. Pt is due for updated labs; I have placed orders for them to have labwork performed at the [facility] draw station in [place]. Pt continues with Lipitor 20 mg/day. -     METABOLIC PANEL, COMPREHENSIVE; Future  -     LIPID PANEL; Future                We discussed the expected course, resolution and complications of the diagnosis(es) in detail. Medication risks, benefits, costs, interactions, and alternatives were discussed as indicated. I advised her to contact the office if her condition worsens, changes or fails to improve as anticipated. She expressed understanding with the diagnosis(es) and plan.      Pursuant to the emergency declaration under the [address] and [facility], [address] [place] [address] authority and the [employer] General Act, this Virtual Visit was conducted, with patient's consent, to reduce the patient's risk of exposure to COVID-19 and provide continuity of care for an established patient. Services were provided through a video synchronous discussion virtually to substitute for in-person clinic visit. Written by robert Renee, as dictated by Joseluis Beasley M.D.    1:25 PM - 1:34 PM    Total time spent with the patient 9 minutes, greater than 50% of time spent counseling patient.

## 2020-11-27 DIAGNOSIS — I10 ESSENTIAL HYPERTENSION: ICD-10-CM

## 2020-11-27 DIAGNOSIS — E78.5 HYPERLIPIDEMIA WITH TARGET LDL LESS THAN 100: ICD-10-CM

## 2020-11-27 DIAGNOSIS — E11.9 CONTROLLED TYPE 2 DIABETES MELLITUS WITHOUT COMPLICATION, WITHOUT LONG-TERM CURRENT USE OF INSULIN (HCC): ICD-10-CM

## 2020-11-27 LAB
ALBUMIN SERPL-MCNC: 4.2 G/DL (ref 3.5–5)
ALBUMIN/GLOB SERPL: 1.2 {RATIO} (ref 1.1–2.2)
ALP SERPL-CCNC: 70 U/L (ref 45–117)
ALT SERPL-CCNC: 15 U/L (ref 12–78)
ANION GAP SERPL CALC-SCNC: 4 MMOL/L (ref 5–15)
AST SERPL-CCNC: 14 U/L (ref 15–37)
BASOPHILS # BLD: 0.1 K/UL (ref 0–0.1)
BASOPHILS NFR BLD: 1 % (ref 0–1)
BILIRUB SERPL-MCNC: 0.5 MG/DL (ref 0.2–1)
BUN SERPL-MCNC: 26 MG/DL (ref 6–20)
BUN/CREAT SERPL: 20 (ref 12–20)
CALCIUM SERPL-MCNC: 9.6 MG/DL (ref 8.5–10.1)
CHLORIDE SERPL-SCNC: 105 MMOL/L (ref 97–108)
CHOLEST SERPL-MCNC: 180 MG/DL
CO2 SERPL-SCNC: 28 MMOL/L (ref 21–32)
CREAT SERPL-MCNC: 1.33 MG/DL (ref 0.55–1.02)
CREAT UR-MCNC: 195 MG/DL
DIFFERENTIAL METHOD BLD: ABNORMAL
EOSINOPHIL # BLD: 0.1 K/UL (ref 0–0.4)
EOSINOPHIL NFR BLD: 2 % (ref 0–7)
ERYTHROCYTE [DISTWIDTH] IN BLOOD BY AUTOMATED COUNT: 14.7 % (ref 11.5–14.5)
EST. AVERAGE GLUCOSE BLD GHB EST-MCNC: 123 MG/DL
GLOBULIN SER CALC-MCNC: 3.4 G/DL (ref 2–4)
GLUCOSE SERPL-MCNC: 103 MG/DL (ref 65–100)
HBA1C MFR BLD: 5.9 % (ref 4–5.6)
HCT VFR BLD AUTO: 34.3 % (ref 35–47)
HDLC SERPL-MCNC: 62 MG/DL
HDLC SERPL: 2.9 {RATIO} (ref 0–5)
HGB BLD-MCNC: 10.9 G/DL (ref 11.5–16)
IMM GRANULOCYTES # BLD AUTO: 0 K/UL (ref 0–0.04)
IMM GRANULOCYTES NFR BLD AUTO: 0 % (ref 0–0.5)
LDLC SERPL CALC-MCNC: 106.4 MG/DL (ref 0–100)
LIPID PROFILE,FLP: ABNORMAL
LYMPHOCYTES # BLD: 2.9 K/UL (ref 0.8–3.5)
LYMPHOCYTES NFR BLD: 44 % (ref 12–49)
MCH RBC QN AUTO: 29.2 PG (ref 26–34)
MCHC RBC AUTO-ENTMCNC: 31.8 G/DL (ref 30–36.5)
MCV RBC AUTO: 92 FL (ref 80–99)
MICROALBUMIN UR-MCNC: 1.1 MG/DL
MICROALBUMIN/CREAT UR-RTO: 6 MG/G (ref 0–30)
MONOCYTES # BLD: 0.6 K/UL (ref 0–1)
MONOCYTES NFR BLD: 9 % (ref 5–13)
NEUTS SEG # BLD: 2.9 K/UL (ref 1.8–8)
NEUTS SEG NFR BLD: 44 % (ref 32–75)
NRBC # BLD: 0 K/UL (ref 0–0.01)
NRBC BLD-RTO: 0 PER 100 WBC
PLATELET # BLD AUTO: 278 K/UL (ref 150–400)
PMV BLD AUTO: 10.6 FL (ref 8.9–12.9)
POTASSIUM SERPL-SCNC: 4.7 MMOL/L (ref 3.5–5.1)
PROT SERPL-MCNC: 7.6 G/DL (ref 6.4–8.2)
RBC # BLD AUTO: 3.73 M/UL (ref 3.8–5.2)
SODIUM SERPL-SCNC: 137 MMOL/L (ref 136–145)
TRIGL SERPL-MCNC: 58 MG/DL (ref ?–150)
VLDLC SERPL CALC-MCNC: 11.6 MG/DL
WBC # BLD AUTO: 6.4 K/UL (ref 3.6–11)

## 2020-11-29 NOTE — PROGRESS NOTES
Mrs. Rowdy Be,    Your hemoglobin A1c has come down to 5.9%. This is excellent we are going to have to look into possibly and likely reducing some of her medication dosage. The LDL is still slightly above 100 but we will continue to monitor this. The renal function is up slightly we will need to reassess this in about 2 weeks and when asked free to be well-hydrated. If we can reduce the nausea that would be great. We will have an appointment on December 1,    So let us talk then about your labs in further detail.

## 2020-12-01 ENCOUNTER — PATIENT MESSAGE (OUTPATIENT)
Dept: FAMILY MEDICINE CLINIC | Age: 60
End: 2020-12-01

## 2020-12-15 ENCOUNTER — VIRTUAL VISIT (OUTPATIENT)
Dept: FAMILY MEDICINE CLINIC | Age: 60
End: 2020-12-15
Payer: COMMERCIAL

## 2020-12-15 DIAGNOSIS — R53.81 MALAISE: ICD-10-CM

## 2020-12-15 DIAGNOSIS — I10 ESSENTIAL HYPERTENSION: ICD-10-CM

## 2020-12-15 DIAGNOSIS — R63.4 WEIGHT LOSS: ICD-10-CM

## 2020-12-15 DIAGNOSIS — R79.89 ELEVATED SERUM CREATININE: ICD-10-CM

## 2020-12-15 DIAGNOSIS — E11.9 CONTROLLED TYPE 2 DIABETES MELLITUS WITHOUT COMPLICATION, WITHOUT LONG-TERM CURRENT USE OF INSULIN (HCC): Primary | ICD-10-CM

## 2020-12-15 PROCEDURE — 99214 OFFICE O/P EST MOD 30 MIN: CPT | Performed by: FAMILY MEDICINE

## 2020-12-15 RX ORDER — LIRAGLUTIDE 6 MG/ML
0.6 INJECTION SUBCUTANEOUS DAILY
Qty: 1 ADJUSTABLE DOSE PRE-FILLED PEN SYRINGE | Refills: 5 | Status: SHIPPED | OUTPATIENT
Start: 2020-12-15 | End: 2020-12-24 | Stop reason: SDUPTHER

## 2020-12-15 NOTE — PROGRESS NOTES
Sae Lau is a 61 y.o. female who was seen by synchronous (real-time) audio-video technology on 12/15/2020 for No chief complaint on file. Ms. Zaynab Driscoll comes in today for follow-up on her diabetes and her nausea and her weight loss. She states that she is feeling better since she reduced her Victoza dosage but still has persistent nausea and she is now 191 pounds. We discussed her labs in detail advised her that her serum creatinine levels are elevated. I advised her that we need to do possibly decrease the dosage of the Victoza and her symptoms still continue in about 2 weeks that we will need to refer her to a gastroenterologist for further evaluation and treatment. Patient is also been advised to go ahead and get lab work done in the next week. We will follow up with her early next year. Assessment & Plan:   Diagnoses and all orders for this visit:    1. Controlled type 2 diabetes mellitus without complication, without long-term current use of insulin ([address]  I discussed with patient that she reduce the dosage of her Victoza and see if her symptoms improve. She will let me know in about a week how she is doing  -     liraglutide (Victoza 3-Darryn) 0.6 mg/0.1 mL (18 mg/3 mL) pnij; 0.6 mg by SubCUTAneous route daily. INJECT 1.8 MG BY SUBCUTANEOUS ROUTE DAILY. 2. Elevated serum creatinine  I advised that recheck her  renal function in a couple weeks I advised that she be well-hydrated. -     METABOLIC PANEL, COMPREHENSIVE; Future    3. Malaise  Her symptoms can be due to her blood sugar levels again with a reduction of Victoza we will see if her GI symptoms subside. I advised her that if his GI symptoms do not subside and are ongoing that we may need to consider sending her to a gastroenterologist for further work-up    4. Weight loss  Patient continues to have weight loss but she said it has been stabilized to a degree again I advised that we keep a very close eye on this.  Patient will follow up with me again in about 2 to 4 weeks  -     CBC WITH AUTOMATED DIFF; Future    5. Essential hypertension  Patient's blood pressure is stable advised that she continue current regimen        I spent at least 15 minutes on this visit with this established patient. Subjective:       Prior to Admission medications    Medication Sig Start Date End Date Taking? Authorizing Provider   ergocalciferol (ERGOCALCIFEROL) 1,250 mcg (50,000 unit) capsule TAKE 1 CAPSULE BY MOUTH ONE TIME PER WEEK 10/25/20   Mariano Peres MD   pioglitazone (ACTOS) 30 mg tablet TAKE 1 TABLET BY MOUTH EVERY DAY 10/4/20   Artem Peres MD   gabapentin (NEURONTIN) 100 mg capsule Take 1 Cap by mouth nightly. Max Daily Amount: 100 mg. 9/1/20   Artem Peres MD   metFORMIN ER (GLUCOPHAGE XR) 750 mg tablet TAKE 1 TABLET BY MOUTH TWICE A DAY  Patient taking differently: daily. 8/22/20   Artem Peres MD   atorvastatin (LIPITOR) 20 mg tablet Take 1 Tab by mouth daily. 8/20/20   Artem Peres MD   liraglutide (Victoza 3-Darryn) 0.6 mg/0.1 mL (18 mg/3 mL) pnij INJECT 1.8 MG BY SUBCUTANEOUS ROUTE DAILY.  8/16/20   Philip Wong MD   irbesartan-hydroCHLOROthiazide (AVALIDE) 150-12.5 mg per tablet TAKE 1 TABLET BY MOUTH EVERY DAY 7/5/20   Artem Peres MD   Insulin Needles, Disposable, 32 gauge x 1/4\" ndle USE AS DIRECTED ONCE DAILY 1/21/20   Philip Wong MD   glucose blood VI test strips (ACCU-CHEK OSCAR PLUS TEST STRP) strip CHECK BLOOD SUGAR DAILY DX:E11.9 12/17/19   Artem Peres MD   Blood-Glucose Meter (ACCU-CHEK OSCAR PLUS METER) misc Check blood sugar daily DX:E11.9 2/4/19   Artem Peres MD   ONETOUCH ULTRA BLUE TEST STRIP strip USE DAILY AS DIRECTED 12/5/18   Philip Wong MD     Patient Active Problem List    Diagnosis Date Noted    Obesity, morbid ([facility] 01/22/2018    Hyperlipidemia with target LDL less than 100 11/11/2015    Diabetes mellitus [facility], [facility] 10/30/2013    Obesity, Class II, BMI 35-39.9, with comorbidity 05/03/2013    Menopause 07/27/2012    Vitamin D deficiency 07/27/2012    Right knee pain 07/27/2012    Eczema 07/01/2011    HTN (hypertension) 07/08/2010     Current Outpatient Medications   Medication Sig Dispense Refill    liraglutide (Victoza 3-Darryn) 0.6 mg/0.1 mL (18 mg/3 mL) pnij 0.6 mg by SubCUTAneous route daily. INJECT 1.8 MG BY SUBCUTANEOUS ROUTE DAILY. 1 Adjustable Dose Pre-filled Pen Syringe 5    ergocalciferol (ERGOCALCIFEROL) 1,250 mcg (50,000 unit) capsule TAKE 1 CAPSULE BY MOUTH ONE TIME PER WEEK 12 Cap 0    pioglitazone (ACTOS) 30 mg tablet TAKE 1 TABLET BY MOUTH EVERY DAY 90 Tab 0    gabapentin (NEURONTIN) 100 mg capsule Take 1 Cap by mouth nightly. Max Daily Amount: 100 mg. 30 Cap 2    metFORMIN ER (GLUCOPHAGE XR) 750 mg tablet TAKE 1 TABLET BY MOUTH TWICE A DAY (Patient taking differently: daily. ) 180 Tab 1    atorvastatin (LIPITOR) 20 mg tablet Take 1 Tab by mouth daily. 90 Tab 1    irbesartan-hydroCHLOROthiazide (AVALIDE) 150-12.5 mg per tablet TAKE 1 TABLET BY MOUTH EVERY DAY 90 Tab 1    Insulin Needles, Disposable, 32 gauge x 1/4\" ndle USE AS DIRECTED ONCE DAILY 100 Pen Needle 3    glucose blood VI test strips (ACCU-CHEK OSCAR PLUS TEST STRP) strip CHECK BLOOD SUGAR DAILY DX:E11.9 150 Strip 1    Blood-Glucose Meter (ACCU-CHEK OSCAR PLUS METER) misc Check blood sugar daily DX:E11.9 1 Each 0    ONETOUCH ULTRA BLUE TEST STRIP strip USE DAILY AS DIRECTED 100 Strip 1     Allergies   Allergen Reactions    Pcn [Penicillins] Rash     Past Medical History:   Diagnosis Date    Diabetes ([facility]     Hypercholesterolemia     Hypertension      No past surgical history on file.   Family History   Problem Relation Age of Onset    Diabetes Mother     Diabetes Father     Diabetes Maternal Aunt     Diabetes Maternal Grandmother     Diabetes Maternal Grandfather     Diabetes Paternal Grandmother     Diabetes Paternal Grandfather      Social History     Tobacco Use    Smoking status: Never Smoker    Smokeless tobacco: Never Used   Substance Use Topics    Alcohol use: No       Review of Systems   Constitutional: Positive for weight loss. Negative for malaise/fatigue. Gastrointestinal: Positive for heartburn and nausea. Negative for vomiting. Objective:     Patient-Reported Vitals 11/29/2020   Patient-Reported Systolic  473   Patient-Reported Diastolic 80        [INSTRUCTIONS:  \"[x]\" Indicates a positive item  \"[]\" Indicates a negative item  -- DELETE ALL ITEMS NOT EXAMINED]    Constitutional: [x] Appears well-developed and well-nourished [x] No apparent distress      [] Abnormal -     Mental status: [x] Alert and awake  [x] Oriented to person/place/time [x] Able to follow commands    [] Abnormal -     Eyes:   EOM    [x]  Normal    [] Abnormal -   Sclera  [x]  Normal    [] Abnormal -          Discharge [x]  None visible   [] Abnormal -     HENT: [x] Normocephalic, atraumatic  [] Abnormal -   [x] Mouth/Throat: Mucous membranes are moist    External Ears [x] Normal  [] Abnormal -    Neck: [x] No visualized mass [] Abnormal -     Pulmonary/Chest: [x] Respiratory effort normal   [x] No visualized signs of difficulty breathing or respiratory distress        [] Abnormal -      Musculoskeletal:   [x] Normal gait with no signs of ataxia         [x] Normal range of motion of neck        [] Abnormal -     Neurological:        [x] No Facial Asymmetry (Cranial nerve 7 motor function) (limited exam due to video visit)          [x] No gaze palsy        [] Abnormal -          Skin:        [x] No significant exanthematous lesions or discoloration noted on facial skin         [] Abnormal -            Psychiatric:       [x] Normal Affect [] Abnormal -        [x] No Hallucinations    Other pertinent observable physical exam findings:-        We discussed the expected course, resolution and complications of the diagnosis(es) in detail.   Medication risks, benefits, costs, interactions, and alternatives were discussed as indicated. I advised her to contact the office if her condition worsens, changes or fails to improve as anticipated. She expressed understanding with the diagnosis(es) and plan. Elizabet Justin, who was evaluated through a patient-initiated, synchronous (real-time) audio-video encounter, and/or her healthcare decision maker, is aware that it is a billable service, with coverage as determined by her insurance carrier. She provided verbal consent to proceed: Yes, and patient identification was verified. It was conducted pursuant to the emergency declaration under the [facility], [address] [place] [address] authority and the [employer]. A caregiver was present when appropriate. Ability to conduct physical exam was limited. I was at home. The patient was at home     Medication risks/benefits/costs/interactions/alternatives discussed with patient. Advised patient to call back or return to office if symptoms worsen/change/persist.  If patient cannot reach us or should anything more severe/urgent arise he/she should proceed directly to the nearest emergency department. Discussed expected course/resolution/complications of diagnosis in detail with patient. Patient given a written after visit summary which includes her diagnoses, current medications and vitals. Patient expressed understanding with the diagnosis and plan. .  Follow-up and Dispositions    · Return in about 2 weeks (around 12/29/2020) for weight follow up.            Earlene Dandy, MD

## 2020-12-24 DIAGNOSIS — E11.9 CONTROLLED TYPE 2 DIABETES MELLITUS WITHOUT COMPLICATION, WITHOUT LONG-TERM CURRENT USE OF INSULIN (HCC): ICD-10-CM

## 2020-12-24 NOTE — TELEPHONE ENCOUNTER
Dr. Everett Mcardle,        [address] [facility] would like to clarify which directions are correct. Is patient injecting 0.6 mg or 1.8 mg per day? Please review and resend. Thank you. Requested Prescriptions     Pending Prescriptions Disp Refills    liraglutide (Victoza 3-Darryn) 0.6 mg/0.1 mL (18 mg/3 mL) pnij 1 Adjustable Dose Pre-filled Pen Syringe 5     Si.6 mg by SubCUTAneous route daily. INJECT 1.8 MG BY SUBCUTANEOUS ROUTE DAILY.

## 2020-12-26 RX ORDER — LIRAGLUTIDE 6 MG/ML
0.6 INJECTION SUBCUTANEOUS DAILY
Qty: 1 ADJUSTABLE DOSE PRE-FILLED PEN SYRINGE | Refills: 5 | Status: SHIPPED | OUTPATIENT
Start: 2020-12-26 | End: 2022-10-11 | Stop reason: SDUPTHER

## 2021-01-04 DIAGNOSIS — E11.9 CONTROLLED TYPE 2 DIABETES MELLITUS WITHOUT COMPLICATION, WITHOUT LONG-TERM CURRENT USE OF INSULIN (HCC): ICD-10-CM

## 2021-01-05 RX ORDER — PIOGLITAZONEHYDROCHLORIDE 30 MG/1
TABLET ORAL
Qty: 90 TAB | Refills: 0 | Status: SHIPPED | OUTPATIENT
Start: 2021-01-05 | End: 2021-04-22

## 2021-01-20 RX ORDER — FLUTICASONE PROPIONATE 50 MCG
SPRAY, SUSPENSION (ML) NASAL
Qty: 1 BOTTLE | Refills: 0 | OUTPATIENT
Start: 2021-01-20

## 2021-01-23 DIAGNOSIS — I10 ESSENTIAL HYPERTENSION: ICD-10-CM

## 2021-01-23 DIAGNOSIS — E55.9 VITAMIN D DEFICIENCY: ICD-10-CM

## 2021-01-24 RX ORDER — ERGOCALCIFEROL 1.25 MG/1
CAPSULE ORAL
Qty: 12 CAP | Refills: 0 | Status: SHIPPED | OUTPATIENT
Start: 2021-01-24 | End: 2021-04-22

## 2021-01-24 RX ORDER — IRBESARTAN AND HYDROCHLOROTHIAZIDE 150; 12.5 MG/1; MG/1
TABLET, FILM COATED ORAL
Qty: 90 TAB | Refills: 0 | Status: SHIPPED | OUTPATIENT
Start: 2021-01-24 | End: 2021-01-27

## 2021-01-27 DIAGNOSIS — I10 ESSENTIAL HYPERTENSION: ICD-10-CM

## 2021-01-27 RX ORDER — IRBESARTAN AND HYDROCHLOROTHIAZIDE 150; 12.5 MG/1; MG/1
TABLET, FILM COATED ORAL
Qty: 90 TAB | Refills: 0 | Status: SHIPPED | OUTPATIENT
Start: 2021-01-27 | End: 2021-04-22

## 2021-02-14 DIAGNOSIS — E78.5 HYPERLIPIDEMIA WITH TARGET LDL LESS THAN 100: ICD-10-CM

## 2021-02-14 RX ORDER — ATORVASTATIN CALCIUM 20 MG/1
TABLET, FILM COATED ORAL
Qty: 90 TAB | Refills: 1 | Status: SHIPPED | OUTPATIENT
Start: 2021-02-14 | End: 2021-08-27

## 2021-02-15 ENCOUNTER — TELEPHONE (OUTPATIENT)
Dept: FAMILY MEDICINE CLINIC | Age: 61
End: 2021-02-15

## 2021-02-15 NOTE — TELEPHONE ENCOUNTER
Chief Complaint   Patient presents with    Prior Bud Deon:  APPROVED 2/15/2021 THROUGH 2/15/2024     [facility] pharmacy faxed approval notification at [phone] with confirmation received.   Patient informed via mychart of medication approval.  Joseph Shanks LPN

## 2021-04-20 DIAGNOSIS — E11.9 CONTROLLED TYPE 2 DIABETES MELLITUS WITHOUT COMPLICATION, WITHOUT LONG-TERM CURRENT USE OF INSULIN (HCC): ICD-10-CM

## 2021-04-22 DIAGNOSIS — I10 ESSENTIAL HYPERTENSION: ICD-10-CM

## 2021-04-22 DIAGNOSIS — E55.9 VITAMIN D DEFICIENCY: ICD-10-CM

## 2021-04-22 RX ORDER — FLUTICASONE PROPIONATE 50 MCG
SPRAY, SUSPENSION (ML) NASAL
Qty: 1 BOTTLE | Refills: 5 | Status: SHIPPED | OUTPATIENT
Start: 2021-04-22

## 2021-04-22 RX ORDER — PIOGLITAZONEHYDROCHLORIDE 30 MG/1
TABLET ORAL
Qty: 90 TAB | Refills: 0 | Status: SHIPPED | OUTPATIENT
Start: 2021-04-22 | End: 2021-05-17

## 2021-04-22 RX ORDER — IRBESARTAN AND HYDROCHLOROTHIAZIDE 150; 12.5 MG/1; MG/1
TABLET, FILM COATED ORAL
Qty: 90 TAB | Refills: 0 | Status: SHIPPED | OUTPATIENT
Start: 2021-04-22 | End: 2021-09-13

## 2021-04-22 RX ORDER — ERGOCALCIFEROL 1.25 MG/1
CAPSULE ORAL
Qty: 12 CAP | Refills: 0 | Status: SHIPPED | OUTPATIENT
Start: 2021-04-22 | End: 2021-08-15

## 2021-04-23 DIAGNOSIS — E55.9 VITAMIN D DEFICIENCY: ICD-10-CM

## 2021-04-23 DIAGNOSIS — E78.5 HYPERLIPIDEMIA WITH TARGET LDL LESS THAN 100: ICD-10-CM

## 2021-04-23 DIAGNOSIS — I15.9 SECONDARY HYPERTENSION: Primary | ICD-10-CM

## 2021-04-23 DIAGNOSIS — E11.9 CONTROLLED TYPE 2 DIABETES MELLITUS WITHOUT COMPLICATION, WITHOUT LONG-TERM CURRENT USE OF INSULIN (HCC): ICD-10-CM

## 2021-05-06 ENCOUNTER — LAB ONLY (OUTPATIENT)
Dept: FAMILY MEDICINE CLINIC | Age: 61
End: 2021-05-06

## 2021-05-06 DIAGNOSIS — I15.9 SECONDARY HYPERTENSION: ICD-10-CM

## 2021-05-06 DIAGNOSIS — E11.9 CONTROLLED TYPE 2 DIABETES MELLITUS WITHOUT COMPLICATION, WITHOUT LONG-TERM CURRENT USE OF INSULIN (HCC): ICD-10-CM

## 2021-05-06 DIAGNOSIS — E78.5 HYPERLIPIDEMIA WITH TARGET LDL LESS THAN 100: ICD-10-CM

## 2021-05-06 DIAGNOSIS — E55.9 VITAMIN D DEFICIENCY: ICD-10-CM

## 2021-05-06 LAB
25(OH)D3 SERPL-MCNC: 94.3 NG/ML (ref 30–100)
ALBUMIN SERPL-MCNC: 4 G/DL (ref 3.5–5)
ALBUMIN/GLOB SERPL: 1.2 {RATIO} (ref 1.1–2.2)
ALP SERPL-CCNC: 66 U/L (ref 45–117)
ALT SERPL-CCNC: 16 U/L (ref 12–78)
ANION GAP SERPL CALC-SCNC: 6 MMOL/L (ref 5–15)
AST SERPL-CCNC: 12 U/L (ref 15–37)
BILIRUB SERPL-MCNC: 0.5 MG/DL (ref 0.2–1)
BUN SERPL-MCNC: 36 MG/DL (ref 6–20)
BUN/CREAT SERPL: 31 (ref 12–20)
CALCIUM SERPL-MCNC: 9.5 MG/DL (ref 8.5–10.1)
CHLORIDE SERPL-SCNC: 106 MMOL/L (ref 97–108)
CHOLEST SERPL-MCNC: 227 MG/DL
CO2 SERPL-SCNC: 27 MMOL/L (ref 21–32)
CREAT SERPL-MCNC: 1.17 MG/DL (ref 0.55–1.02)
CREAT UR-MCNC: 217 MG/DL
ERYTHROCYTE [DISTWIDTH] IN BLOOD BY AUTOMATED COUNT: 15.3 % (ref 11.5–14.5)
GLOBULIN SER CALC-MCNC: 3.3 G/DL (ref 2–4)
GLUCOSE SERPL-MCNC: 124 MG/DL (ref 65–100)
HCT VFR BLD AUTO: 35.1 % (ref 35–47)
HDLC SERPL-MCNC: 65 MG/DL
HDLC SERPL: 3.5 {RATIO} (ref 0–5)
HGB BLD-MCNC: 11.2 G/DL (ref 11.5–16)
LDLC SERPL CALC-MCNC: 151 MG/DL (ref 0–100)
LIPID PROFILE,FLP: ABNORMAL
MCH RBC QN AUTO: 29.1 PG (ref 26–34)
MCHC RBC AUTO-ENTMCNC: 31.9 G/DL (ref 30–36.5)
MCV RBC AUTO: 91.2 FL (ref 80–99)
MICROALBUMIN UR-MCNC: 1.32 MG/DL
MICROALBUMIN/CREAT UR-RTO: 6 MG/G (ref 0–30)
NRBC # BLD: 0 K/UL (ref 0–0.01)
NRBC BLD-RTO: 0 PER 100 WBC
PLATELET # BLD AUTO: 264 K/UL (ref 150–400)
PMV BLD AUTO: 10.2 FL (ref 8.9–12.9)
POTASSIUM SERPL-SCNC: 4.7 MMOL/L (ref 3.5–5.1)
PROT SERPL-MCNC: 7.3 G/DL (ref 6.4–8.2)
RBC # BLD AUTO: 3.85 M/UL (ref 3.8–5.2)
SODIUM SERPL-SCNC: 139 MMOL/L (ref 136–145)
TRIGL SERPL-MCNC: 55 MG/DL (ref ?–150)
VLDLC SERPL CALC-MCNC: 11 MG/DL
WBC # BLD AUTO: 5.4 K/UL (ref 3.6–11)

## 2021-05-10 NOTE — PROGRESS NOTES
Ms. Miles Saleh,    We will discuss her labs on 5/17/2021. Also when you come in we will do a point-of-care  hemoglobin A1c testing. No need for fasting. We will need to discuss her lipid panel in detail.

## 2021-05-17 ENCOUNTER — OFFICE VISIT (OUTPATIENT)
Dept: FAMILY MEDICINE CLINIC | Age: 61
End: 2021-05-17
Payer: COMMERCIAL

## 2021-05-17 VITALS
HEART RATE: 73 BPM | TEMPERATURE: 97.6 F | RESPIRATION RATE: 16 BRPM | SYSTOLIC BLOOD PRESSURE: 116 MMHG | DIASTOLIC BLOOD PRESSURE: 75 MMHG | BODY MASS INDEX: 31.71 KG/M2 | OXYGEN SATURATION: 100 % | WEIGHT: 179 LBS | HEIGHT: 63 IN

## 2021-05-17 DIAGNOSIS — E11.9 CONTROLLED TYPE 2 DIABETES MELLITUS WITHOUT COMPLICATION, WITHOUT LONG-TERM CURRENT USE OF INSULIN (HCC): Primary | ICD-10-CM

## 2021-05-17 DIAGNOSIS — N30.00 ACUTE CYSTITIS WITHOUT HEMATURIA: ICD-10-CM

## 2021-05-17 DIAGNOSIS — I10 ESSENTIAL HYPERTENSION: ICD-10-CM

## 2021-05-17 DIAGNOSIS — R63.4 WEIGHT LOSS: ICD-10-CM

## 2021-05-17 DIAGNOSIS — E78.5 HYPERLIPIDEMIA WITH TARGET LDL LESS THAN 100: ICD-10-CM

## 2021-05-17 DIAGNOSIS — N95.1 HOT FLASHES DUE TO MENOPAUSE: ICD-10-CM

## 2021-05-17 DIAGNOSIS — M54.6 ACUTE LEFT-SIDED THORACIC BACK PAIN: ICD-10-CM

## 2021-05-17 LAB
BILIRUB UR QL STRIP: ABNORMAL
COMMENT, HOLDF: NORMAL
GLUCOSE UR-MCNC: NEGATIVE MG/DL
HBA1C MFR BLD HPLC: 5.6 %
KETONES P FAST UR STRIP-MCNC: ABNORMAL MG/DL
PH UR STRIP: 6 [PH] (ref 4.6–8)
PROT UR QL STRIP: NEGATIVE
SAMPLES BEING HELD,HOLD: NORMAL
SP GR UR STRIP: 1.02 (ref 1–1.03)
UA UROBILINOGEN AMB POC: ABNORMAL (ref 0.2–1)
URINALYSIS CLARITY POC: ABNORMAL
URINALYSIS COLOR POC: YELLOW
URINE BLOOD POC: NEGATIVE
URINE LEUKOCYTES POC: ABNORMAL
URINE NITRITES POC: NEGATIVE

## 2021-05-17 PROCEDURE — 81001 URINALYSIS AUTO W/SCOPE: CPT | Performed by: FAMILY MEDICINE

## 2021-05-17 PROCEDURE — 83036 HEMOGLOBIN GLYCOSYLATED A1C: CPT | Performed by: FAMILY MEDICINE

## 2021-05-17 PROCEDURE — 99214 OFFICE O/P EST MOD 30 MIN: CPT | Performed by: FAMILY MEDICINE

## 2021-05-17 RX ORDER — NITROFURANTOIN 25; 75 MG/1; MG/1
100 CAPSULE ORAL 2 TIMES DAILY
Qty: 14 CAP | Refills: 0 | Status: SHIPPED | OUTPATIENT
Start: 2021-05-17 | End: 2021-11-02 | Stop reason: ALTCHOICE

## 2021-05-17 RX ORDER — METFORMIN HYDROCHLORIDE 750 MG/1
750 TABLET, EXTENDED RELEASE ORAL DAILY
Qty: 180 TAB | Refills: 0
Start: 2021-05-17 | End: 2022-03-08 | Stop reason: DRUGHIGH

## 2021-05-17 RX ORDER — PIOGLITAZONEHYDROCHLORIDE 30 MG/1
15 TABLET ORAL DAILY
Qty: 90 TAB | Refills: 0
Start: 2021-05-17 | End: 2021-07-15

## 2021-05-17 NOTE — PROGRESS NOTES
HISTORY OF PRESENT ILLNESS  Wilmer Rasmussen is a 61 y.o. female. Blood pressure 116/75, pulse 73, temperature 97.6 °F (36.4 °C), temperature source Temporal, resp. rate 16, height 5' 3\" (1.6 m), weight 179 lb (81.2 kg), last menstrual period 06/30/2013, SpO2 100 %. Chief Complaint   Patient presents with    Medication Evaluation    Weight Management       HPI   Ms. Mayelin Preciado comes in today for follow-up on her chronic conditions of diabetes hyperlipidemia and hypertension. She has lost several pounds since I last her she is been dieting decreasing her caloric intake. For the most part she is doing good with regards to her chronic condition management. She wanted to discuss acute left-sided back pain she has had for a couple weeks now pain is about a 5-6 out of 10 intermittent dull denies any trauma or any injury. She also been having some slight burning with urination wanted to get her urine checked. She is also been having hot flushes she says it has been significant so bad that when she wakes up that her \"earwax\" melts. She is not sure what is the cause of that. She had seen a GYN she was not happy with because she was given Neurontin she stopped that she requested referral to another GYN. Allergies   Allergen Reactions    Pcn [Penicillins] Rash     Past Medical History:   Diagnosis Date    Diabetes ([address]     Hypercholesterolemia     Hypertension      History reviewed. No pertinent surgical history. Family History   Problem Relation Age of Onset    Diabetes Mother     Diabetes Father     Diabetes Maternal Aunt     Diabetes Maternal Grandmother     Diabetes Maternal Grandfather     Diabetes Paternal Grandmother     Diabetes Paternal Grandfather      Social History     Tobacco Use    Smoking status: Never Smoker    Smokeless tobacco: Never Used   Substance Use Topics    Alcohol use: No         Review of Systems   Constitutional: Negative for malaise/fatigue.    Eyes: Negative for blurred vision. Respiratory: Negative for shortness of breath. Cardiovascular: Negative for chest pain. Genitourinary: Negative for frequency. Neurological: Negative for dizziness, tingling, sensory change and headaches. Endo/Heme/Allergies: Negative for polydipsia. All other systems reviewed and are negative. Physical Exam  Vitals signs and nursing note reviewed. Constitutional:       General: She is not in acute distress. Appearance: She is well-developed. She is not diaphoretic. HENT:      Head: Normocephalic and atraumatic. Neck:      Vascular: No carotid bruit. Cardiovascular:      Rate and Rhythm: Normal rate and regular rhythm. Heart sounds: Normal heart sounds. No murmur. No friction rub. No gallop. Pulmonary:      Effort: Pulmonary effort is normal. No respiratory distress. Breath sounds: Normal breath sounds. No wheezing or rales. Neurological:      Mental Status: She is alert and oriented to person, place, and time. Psychiatric:         Behavior: Behavior normal.         Thought Content: Thought content normal.         Judgment: Judgment normal.         ASSESSMENT and PLAN  Diagnoses and all orders for this visit:    1.  Controlled type 2 diabetes mellitus without complication, without long-term current use of insulin ([facility])  -     AMB POC HEMOGLOBIN A1C  Results for orders placed or performed in visit on 05/17/21   AMB POC HEMOGLOBIN A1C   Result Value Ref Range    Hemoglobin A1c (POC) 5.6 %   AMB POC URINALYSIS DIP STICK AUTO W/ MICRO   Result Value Ref Range    Color (UA POC) Yellow     Clarity (UA POC) Cloudy     Glucose (UA POC) Negative Negative    Bilirubin (UA POC) 1+ Negative    Ketones (UA POC) 1+ Negative    Specific gravity (UA POC) 1.025 1.001 - 1.035    Blood (UA POC) Negative Negative    pH (UA POC) 6.0 4.6 - 8.0    Protein (UA POC) Negative Negative    Urobilinogen (UA POC) 1 mg/dL 0.2 - 1    Nitrites (UA POC) Negative Negative    Leukocyte esterase (UA POC) Trace Negative     I advised her to take half of her Actos dosage from 30 to 50 mg daily. Also she has been taking Metformin 750 XR once a day versus twice a day. I advised her to continue this regimen and with the Victoza and we will reassess again in 3 months. I told her the like to eventually get her off of the Actos. -     AMB POC URINALYSIS DIP STICK AUTO W/ MICRO    2. Hyperlipidemia with target LDL less than 100  Patient's lipid panels not at goal when last checked we will reassess again today    3. Essential hypertension  Blood pressure goal continue current regimen    4. Low back pain without sciatica, unspecified back pain laterality, unspecified chronicity  Back pain unclear as to the cause of the pain may be more musculoskeletal the patient does have a slight urinary tract infection will check a uric and culture on her  -     AMB POC URINALYSIS DIP STICK AUTO W/ MICRO    5. Acute cystitis without hematuria  Order placed for urine culture  -     AMB POC URINALYSIS DIP STICK AUTO W/ MICRO        Medication risks/benefits/costs/interactions/alternatives discussed with patient. Advised patient to call back or return to office if symptoms worsen/change/persist.  If patient cannot reach us or should anything more severe/urgent arise he/she should proceed directly to the nearest emergency department. Discussed expected course/resolution/complications of diagnosis in detail with patient. Patient given a written after visit summary which includes her diagnoses, current medications and vitals. Patient expressed understanding with the diagnosis and plan.

## 2021-05-17 NOTE — PROGRESS NOTES
Chief Complaint   Patient presents with    Medication Evaluation     1. Have you been to the ER, urgent care clinic since your last visit? Hospitalized since your last visit? no    2. Have you seen or consulted any other health care providers outside of the [address] [place] [address] since your last visit? Include any pap smears or colon screening. No    Eye Exam VEI     PAP DR. Luo

## 2021-05-18 DIAGNOSIS — Z79.4 CONTROLLED TYPE 2 DIABETES MELLITUS WITH COMPLICATION, WITH LONG-TERM CURRENT USE OF INSULIN (HCC): ICD-10-CM

## 2021-05-18 DIAGNOSIS — E11.8 CONTROLLED TYPE 2 DIABETES MELLITUS WITH COMPLICATION, WITH LONG-TERM CURRENT USE OF INSULIN (HCC): ICD-10-CM

## 2021-05-18 LAB
ALBUMIN SERPL-MCNC: 4 G/DL (ref 3.5–5)
ALBUMIN/GLOB SERPL: 1.3 {RATIO} (ref 1.1–2.2)
ALP SERPL-CCNC: 66 U/L (ref 45–117)
ALT SERPL-CCNC: 19 U/L (ref 12–78)
ANION GAP SERPL CALC-SCNC: 5 MMOL/L (ref 5–15)
AST SERPL-CCNC: 15 U/L (ref 15–37)
BILIRUB SERPL-MCNC: 0.4 MG/DL (ref 0.2–1)
BUN SERPL-MCNC: 20 MG/DL (ref 6–20)
BUN/CREAT SERPL: 20 (ref 12–20)
CALCIUM SERPL-MCNC: 9.3 MG/DL (ref 8.5–10.1)
CHLORIDE SERPL-SCNC: 106 MMOL/L (ref 97–108)
CHOLEST SERPL-MCNC: 195 MG/DL
CO2 SERPL-SCNC: 29 MMOL/L (ref 21–32)
CREAT SERPL-MCNC: 1.01 MG/DL (ref 0.55–1.02)
GLOBULIN SER CALC-MCNC: 3.1 G/DL (ref 2–4)
GLUCOSE SERPL-MCNC: 100 MG/DL (ref 65–100)
HDLC SERPL-MCNC: 63 MG/DL
HDLC SERPL: 3.1 {RATIO} (ref 0–5)
LDLC SERPL CALC-MCNC: 116.2 MG/DL (ref 0–100)
POTASSIUM SERPL-SCNC: 4.4 MMOL/L (ref 3.5–5.1)
PROT SERPL-MCNC: 7.1 G/DL (ref 6.4–8.2)
SODIUM SERPL-SCNC: 140 MMOL/L (ref 136–145)
T4 FREE SERPL-MCNC: 1.1 NG/DL (ref 0.8–1.5)
TRIGL SERPL-MCNC: 79 MG/DL (ref ?–150)
TSH SERPL DL<=0.05 MIU/L-ACNC: 0.43 UIU/ML (ref 0.36–3.74)
VLDLC SERPL CALC-MCNC: 15.8 MG/DL

## 2021-05-19 ENCOUNTER — TELEPHONE (OUTPATIENT)
Dept: FAMILY MEDICINE CLINIC | Age: 61
End: 2021-05-19

## 2021-05-19 LAB
BACTERIA SPEC CULT: NORMAL
SERVICE CMNT-IMP: NORMAL

## 2021-05-19 NOTE — TELEPHONE ENCOUNTER
----- Message from Scott Umanzor LPN sent at 0/17/8160  2:21 PM EDT -----  Eye Exam VEI     PAP DR. Luo

## 2021-05-20 RX ORDER — BLOOD SUGAR DIAGNOSTIC
STRIP MISCELLANEOUS
Qty: 100 PEN NEEDLE | Refills: 1 | Status: SHIPPED | OUTPATIENT
Start: 2021-05-20 | End: 2021-11-04

## 2021-05-20 RX ORDER — BLOOD SUGAR DIAGNOSTIC
STRIP MISCELLANEOUS
Qty: 100 PEN NEEDLE | Refills: 3 | Status: SHIPPED | OUTPATIENT
Start: 2021-05-20 | End: 2022-10-11

## 2021-05-31 NOTE — PROGRESS NOTES
Ms. Blanche Edort up the excellent work. Of note your cholesterol panel is looking good. I would like the LDL cholesterol to be less than 100. It was 116.2. But that was better than 151. So let us just continue to monitor and reassess again when you come in for your next office visit. The rest of your labs are stable. If you have any questions please let me know

## 2021-07-15 DIAGNOSIS — E11.9 CONTROLLED TYPE 2 DIABETES MELLITUS WITHOUT COMPLICATION, WITHOUT LONG-TERM CURRENT USE OF INSULIN (HCC): ICD-10-CM

## 2021-07-15 RX ORDER — PIOGLITAZONEHYDROCHLORIDE 30 MG/1
TABLET ORAL
Qty: 90 TABLET | Refills: 0 | Status: SHIPPED | OUTPATIENT
Start: 2021-07-15 | End: 2021-11-02

## 2021-08-11 DIAGNOSIS — E55.9 VITAMIN D DEFICIENCY: ICD-10-CM

## 2021-08-15 RX ORDER — ERGOCALCIFEROL 1.25 MG/1
CAPSULE ORAL
Qty: 12 CAPSULE | Refills: 0 | Status: SHIPPED | OUTPATIENT
Start: 2021-08-15 | End: 2021-10-29

## 2021-08-27 DIAGNOSIS — E78.5 HYPERLIPIDEMIA WITH TARGET LDL LESS THAN 100: ICD-10-CM

## 2021-08-27 RX ORDER — ATORVASTATIN CALCIUM 20 MG/1
TABLET, FILM COATED ORAL
Qty: 90 TABLET | Refills: 1 | Status: SHIPPED | OUTPATIENT
Start: 2021-08-27 | End: 2022-02-26

## 2021-09-12 DIAGNOSIS — I10 ESSENTIAL HYPERTENSION: ICD-10-CM

## 2021-09-13 RX ORDER — IRBESARTAN AND HYDROCHLOROTHIAZIDE 150; 12.5 MG/1; MG/1
TABLET, FILM COATED ORAL
Qty: 90 TABLET | Refills: 0 | Status: SHIPPED | OUTPATIENT
Start: 2021-09-13 | End: 2022-01-19

## 2021-10-22 ENCOUNTER — TELEPHONE (OUTPATIENT)
Dept: FAMILY MEDICINE CLINIC | Age: 61
End: 2021-10-22

## 2021-10-22 NOTE — TELEPHONE ENCOUNTER
Need a prescription for malaria pills.   going to [facility] 11/19/2021 - 11/30/2021

## 2021-10-27 DIAGNOSIS — E55.9 VITAMIN D DEFICIENCY: ICD-10-CM

## 2021-10-29 RX ORDER — ERGOCALCIFEROL 1.25 MG/1
CAPSULE ORAL
Qty: 12 CAPSULE | Refills: 0 | Status: SHIPPED | OUTPATIENT
Start: 2021-10-29 | End: 2022-01-19

## 2021-11-02 ENCOUNTER — OFFICE VISIT (OUTPATIENT)
Dept: FAMILY MEDICINE CLINIC | Age: 61
End: 2021-11-02
Payer: COMMERCIAL

## 2021-11-02 ENCOUNTER — TELEPHONE (OUTPATIENT)
Dept: FAMILY MEDICINE CLINIC | Age: 61
End: 2021-11-02

## 2021-11-02 VITALS
SYSTOLIC BLOOD PRESSURE: 130 MMHG | HEART RATE: 65 BPM | BODY MASS INDEX: 30.65 KG/M2 | WEIGHT: 173 LBS | DIASTOLIC BLOOD PRESSURE: 62 MMHG | HEIGHT: 63 IN | RESPIRATION RATE: 16 BRPM | OXYGEN SATURATION: 100 % | TEMPERATURE: 97.5 F

## 2021-11-02 DIAGNOSIS — Z23 ENCOUNTER FOR IMMUNIZATION: Primary | ICD-10-CM

## 2021-11-02 DIAGNOSIS — E11.9 CONTROLLED TYPE 2 DIABETES MELLITUS WITHOUT COMPLICATION, WITHOUT LONG-TERM CURRENT USE OF INSULIN (HCC): ICD-10-CM

## 2021-11-02 DIAGNOSIS — Z23 NEEDS FLU SHOT: ICD-10-CM

## 2021-11-02 DIAGNOSIS — E78.5 HYPERLIPIDEMIA WITH TARGET LDL LESS THAN 100: ICD-10-CM

## 2021-11-02 DIAGNOSIS — I10 ESSENTIAL HYPERTENSION: ICD-10-CM

## 2021-11-02 DIAGNOSIS — Z71.84 COUNSELING FOR TRAVEL: ICD-10-CM

## 2021-11-02 LAB — HBA1C MFR BLD HPLC: 5.5 %

## 2021-11-02 PROCEDURE — 90686 IIV4 VACC NO PRSV 0.5 ML IM: CPT | Performed by: FAMILY MEDICINE

## 2021-11-02 PROCEDURE — 99214 OFFICE O/P EST MOD 30 MIN: CPT | Performed by: FAMILY MEDICINE

## 2021-11-02 PROCEDURE — 90471 IMMUNIZATION ADMIN: CPT | Performed by: FAMILY MEDICINE

## 2021-11-02 PROCEDURE — 83036 HEMOGLOBIN GLYCOSYLATED A1C: CPT | Performed by: FAMILY MEDICINE

## 2021-11-02 RX ORDER — CIPROFLOXACIN 500 MG/1
500 TABLET ORAL 2 TIMES DAILY
Qty: 20 TABLET | Refills: 0 | Status: SHIPPED | OUTPATIENT
Start: 2021-11-02 | End: 2021-11-11 | Stop reason: ALTCHOICE

## 2021-11-02 RX ORDER — ATOVAQUONE AND PROGUANIL HYDROCHLORIDE 250; 100 MG/1; MG/1
TABLET, FILM COATED ORAL
Qty: 30 TABLET | Refills: 0 | Status: SHIPPED | OUTPATIENT
Start: 2021-11-02 | End: 2022-10-11 | Stop reason: SDUPTHER

## 2021-11-02 RX ORDER — SALMONELLA TYPHI TY2 VI POLYSACCHARIDE ANTIGEN 25 UG/.5ML
0.5 INJECTION, SOLUTION INTRAMUSCULAR ONCE
Qty: 1 EACH | Refills: 0 | Status: SHIPPED | OUTPATIENT
Start: 2021-11-02 | End: 2021-11-02

## 2021-11-02 RX ORDER — CIPROFLOXACIN 500 MG/1
500 TABLET ORAL 2 TIMES DAILY
Qty: 20 TABLET | Refills: 0 | Status: CANCELLED | OUTPATIENT
Start: 2021-11-02 | End: 2021-11-12

## 2021-11-02 NOTE — TELEPHONE ENCOUNTER
Dr. Silvano Kumar,            The Cipro 500 mg and all the strengths of Cipro are on manufacture back order. Please review and prescribe alternate therapy. Thank you. Requested Prescriptions     Pending Prescriptions Disp Refills    ciprofloxacin HCl (CIPRO) 500 mg tablet 20 Tablet 0     Sig: Take 1 Tablet by mouth two (2) times a day for 10 days.

## 2021-11-02 NOTE — PROGRESS NOTES
Chief Complaint   Patient presents with    Diabetes     1. Have you been to the ER, urgent care clinic since your last visit? Hospitalized since your last visit?  no  2. Have you seen or consulted any other health care providers outside of the [address] [place] [address] since your last visit? Include any pap smears or colon screening.    no

## 2021-11-02 NOTE — PROGRESS NOTES
HPI  Cherelle Gomes 64 y.o. female  presents to the office today Diabetes. Blood pressure 130/62, pulse 65, temperature 97.5 °F (36.4 °C), temperature source Temporal, resp. rate 16, height 5' 3\" (1.6 m), weight 173 lb (78.5 kg), last menstrual period 06/30/2013, SpO2 100 %. Body mass index is 30.65 kg/m². Chief Complaint   Patient presents with    Diabetes     DM2: A1c per POC today 5.5, down from A1c of 5.6 on 05/17/21. Pt is currently on Metformin ER 750mg take 1 tablet orally daily and Actos 30mg 1 tablet orally everyday which she has cut down to 15mg. I have discontinued to pt off of Actos. Hypertension: BP at office today 130/62. Pt continues with Avalide 150-12.5 mg take 1 tablet orally daily. Hyperlipidemia: Lipid panel on 05/17/21 notable for total cholesterol 195, HDL 63, .2, and triglycerides 79. Pt continues with Lipitor 20mg take 1 tablet orally daily. Pt will have updated lab work performed during today's OV. Counseling for Travelling: Pt reports that she is going to [facility] ([place] for 12 days. I have rx'd the pt Malarone 250-100mg take 2 days prior to exposure and take 2 days after exposure and Typhim VI 25mcg/0.5 mL by intramuscular route once for 1 dose. I have also rx'd the pt Cipro 500mg take 1 tablet orally BID for 10 days. Health Maintenance:  Pt requests to receive their flu shot during today's OV;  I will have one of my nurses administer it. Current Outpatient Medications   Medication Sig Dispense Refill    atovaquone-proguaniL (MALARONE) 250-100 mg per tablet Take 2 days prior to exposure and take 2 days after exposure 30 Tablet 0    typhoid vi polysacch vaccine (Typhim VI) 25 mcg/0.5 mL injection 0.5 mL by IntraMUSCular route once for 1 dose. 1 Each 0    ciprofloxacin HCl (CIPRO) 500 mg tablet Take 1 Tablet by mouth two (2) times a day for 10 days.  20 Tablet 0    ergocalciferol (ERGOCALCIFEROL) 1,250 mcg (50,000 unit) capsule TAKE 1 CAPSULE BY MOUTH ONE TIME PER WEEK 12 Capsule 0    irbesartan-hydroCHLOROthiazide (AVALIDE) 150-12.5 mg per tablet TAKE 1 TABLET BY MOUTH EVERY DAY 90 Tablet 0    atorvastatin (LIPITOR) 20 mg tablet TAKE 1 TABLET BY MOUTH EVERY DAY 90 Tablet 1    Insulin Needles, Disposable, (BD Ultra-Fine Micro Pen Needle) 32 gauge x 1/4\" ndle USE AS DIRECTED ONCE DAILY 100 Pen Needle 3    Insulin Needles, Disposable, 32 gauge x 1/4\" ndle USE AS DIRECTED ONCE DAILY 100 Pen Needle 1    metFORMIN ER (GLUCOPHAGE XR) 750 mg tablet Take 1 Tab by mouth daily. 180 Tab 0    fluticasone propionate (FLONASE) 50 mcg/actuation nasal spray INSTILL 2 SPRAYS EVEYR DAY UNTIL DIRECTED TO STOP 1 Bottle 5    liraglutide (Victoza 3-Darryn) 0.6 mg/0.1 mL (18 mg/3 mL) pnij 0.6 mg by SubCUTAneous route daily. INJECT 1.8 MG BY SUBCUTANEOUS ROUTE DAILY. 1 Adjustable Dose Pre-filled Pen Syringe 5    glucose blood VI test strips (ACCU-CHEK OSCAR PLUS TEST STRP) strip CHECK BLOOD SUGAR DAILY DX:E11.9 150 Strip 1    Blood-Glucose Meter (ACCU-CHEK OSCAR PLUS METER) misc Check blood sugar daily DX:E11.9 1 Each 0     Allergies   Allergen Reactions    Pcn [Penicillins] Rash     Past Medical History:   Diagnosis Date    Diabetes ([facility] [address]     Hypercholesterolemia     Hypertension      History reviewed. No pertinent surgical history. Family History   Problem Relation Age of Onset    Diabetes Mother     Diabetes Father     Diabetes Maternal Aunt     Diabetes Maternal Grandmother     Diabetes Maternal Grandfather     Diabetes Paternal Grandmother     Diabetes Paternal Grandfather      Social History     Tobacco Use    Smoking status: Never Smoker    Smokeless tobacco: Never Used   Substance Use Topics    Alcohol use: No        Review of Systems   Constitutional: Negative for chills and fever. HENT: Negative for hearing loss and tinnitus. Eyes: Negative for blurred vision and double vision. Respiratory: Negative for cough and shortness of breath.     Cardiovascular: Negative for chest pain and palpitations. Gastrointestinal: Negative for nausea and vomiting. Genitourinary: Negative for dysuria and frequency. Musculoskeletal: Negative for back pain and falls. Skin: Negative for itching and rash. Neurological: Negative for dizziness, loss of consciousness and headaches. Endo/Heme/Allergies: Negative. Psychiatric/Behavioral: Negative for depression. The patient is not nervous/anxious. Physical Exam  Constitutional:       Appearance: Normal appearance. HENT:      Head: Normocephalic and atraumatic. Right Ear: Tympanic membrane, ear canal and external ear normal.      Left Ear: Tympanic membrane, ear canal and external ear normal.      Nose: Nose normal.      Mouth/Throat:      Mouth: Mucous membranes are moist.      Pharynx: Oropharynx is clear. Eyes:      Extraocular Movements: Extraocular movements intact. Conjunctiva/sclera: Conjunctivae normal.      Pupils: Pupils are equal, round, and reactive to light. Cardiovascular:      Rate and Rhythm: Normal rate and regular rhythm. Pulses: Normal pulses. Heart sounds: Normal heart sounds. Pulmonary:      Effort: Pulmonary effort is normal.      Breath sounds: Normal breath sounds. Abdominal:      General: Abdomen is flat. Bowel sounds are normal.      Palpations: Abdomen is soft. Genitourinary:     General: Normal vulva. Rectum: Normal.   Musculoskeletal:         General: Normal range of motion. Feet:      Comments: Diabetic foot exam:     Left Foot:   Visual Exam: normal    Pulse DP: 2+ (normal)   Filament test: normal sensation    Vibratory sensation: normal      Right Foot:   Visual Exam: normal    Pulse DP: 2+ (normal)   Filament test: normal sensation    Vibratory sensation: normal    Skin:     General: Skin is warm and dry. Neurological:      General: No focal deficit present. Mental Status: She is alert and oriented to person, place, and time.  Mental status is at baseline. Psychiatric:         Mood and Affect: Mood normal.         Behavior: Behavior normal.         Judgment: Judgment normal.           ASSESSMENT and PLAN  Diagnoses and all orders for this visit:    1. Controlled type 2 diabetes mellitus without complication, without long-term current use of insulin ([facility])  A1c per POC today 5.5, down from A1c of 5.6 on 05/17/21. Pt is currently on Metformin ER 750mg take 1 tablet orally daily and Actos 30mg 1 tablet orally everyday which she has cut down to 15mg. I have discontinued to pt off of Actos. -     AMB POC HEMOGLOBIN A1C  -      DIABETES FOOT EXAM    2. Counseling for travel  Pt reports that she is going to [place] ([place]) for 12 days. I have rx'd the pt Malarone 250-100mg take 2 days prior to exposure and take 2 days after exposure and Typhim VI 25mcg/0.5 mL by intramuscular route once for 1 dose. I have also rx'd the pt Cipro 500mg take 1 tablet orally BID for 10 days. -     atovaquone-proguaniL (MALARONE) 250-100 mg per tablet; Take 2 days prior to exposure and take 2 days after exposure  -     typhoid vi polysacch vaccine (Typhim VI) 25 mcg/0.5 mL injection; 0.5 mL by IntraMUSCular route once for 1 dose. -     ciprofloxacin HCl (CIPRO) 500 mg tablet; Take 1 Tablet by mouth two (2) times a day for 10 days. 3. Essential hypertension  BP at office today 130/62. Pt continues with Avalide 150-12.5 mg take 1 tablet orally daily.   -     METABOLIC PANEL, COMPREHENSIVE; Future    4. Hyperlipidemia with target LDL less than 100  Lipid panel on 05/17/21 notable for total cholesterol 195, HDL 63, .2, and triglycerides 79. Pt continues with Lipitor 20mg take 1 tablet orally daily. Pt will have updated lab work performed during today's OV.    -     LIPID PANEL; Future    6.  Needs flu shot  Pt requests to receive their flu shot during today's OV;  I will have one of my nurses administer it.   -     INFLUENZA VIRUS VAC QUAD,SPLIT,PRESV FREE SYRINGE IM          Follow-up and Dispositions    · Return in about 3 months (around 2/2/2022) for diabetes follow up. Medication risks/benefits/costs/interactions/alternatives discussed with patient. Advised patient to call back or return to office if symptoms worsen/change/persist.  If patient cannot reach us or should anything more severe/urgent arise he/she should proceed directly to the nearest emergency department. Discussed expected course/resolution/complications of diagnosis in detail with patient. Patient given a written after visit summary which includes diagnoses, current medications and vitals. Patient expressed understanding with the diagnosis and plan. Written by valencia Gore, as dictated by Angie Spencer M.D.    12:12 PM - 12:32 PM    Total time spent with the patient 20 minutes, greater than 50% of time spent counseling patient.

## 2021-11-03 ENCOUNTER — TELEPHONE (OUTPATIENT)
Dept: FAMILY MEDICINE CLINIC | Age: 61
End: 2021-11-03

## 2021-11-03 NOTE — TELEPHONE ENCOUNTER
Dr. Bender Records,             The Cipro 500 mg and all the strengths of Cipro are on manufacture back order. Please review and prescribe alternate therapy.  Thank you.      Requested Prescriptions             Pending Prescriptions Disp Refills    ciprofloxacin HCl (CIPRO) 500 mg tablet 20 Tablet 0       Sig: Take 1 Tablet by mouth two (2) times a day for 10 days.             Documentation      [id number]engH[facility] [phone]  Alyssa Alcantara 17 hours ago (3:58 PM)

## 2021-11-04 DIAGNOSIS — Z79.4 CONTROLLED TYPE 2 DIABETES MELLITUS WITH COMPLICATION, WITH LONG-TERM CURRENT USE OF INSULIN (HCC): ICD-10-CM

## 2021-11-04 DIAGNOSIS — E11.8 CONTROLLED TYPE 2 DIABETES MELLITUS WITH COMPLICATION, WITH LONG-TERM CURRENT USE OF INSULIN (HCC): ICD-10-CM

## 2021-11-04 RX ORDER — PEN NEEDLE, DIABETIC 32GX 5/32"
NEEDLE, DISPOSABLE MISCELLANEOUS
Qty: 100 PEN NEEDLE | Refills: 11 | Status: SHIPPED | OUTPATIENT
Start: 2021-11-04

## 2021-11-11 RX ORDER — LEVOFLOXACIN 500 MG/1
500 TABLET, FILM COATED ORAL DAILY
Qty: 10 TABLET | Refills: 0 | Status: SHIPPED | OUTPATIENT
Start: 2021-11-11 | End: 2021-11-21

## 2021-12-18 ENCOUNTER — E-VISIT (OUTPATIENT)
Dept: FAMILY MEDICINE CLINIC | Age: 61
End: 2021-12-18

## 2021-12-19 ENCOUNTER — E-VISIT (OUTPATIENT)
Dept: FAMILY MEDICINE CLINIC | Age: 61
End: 2021-12-19

## 2021-12-19 DIAGNOSIS — J06.9 UPPER RESPIRATORY TRACT INFECTION, UNSPECIFIED TYPE: Primary | ICD-10-CM

## 2021-12-20 ENCOUNTER — TELEPHONE (OUTPATIENT)
Dept: FAMILY MEDICINE CLINIC | Age: 61
End: 2021-12-20

## 2021-12-20 NOTE — TELEPHONE ENCOUNTER
----- Message from Jose R Arango sent at 12/18/2021 10:58 AM EST -----  Subject: Message to Provider    QUESTIONS  Information for Provider? Pt is running a low grade fever, headache and   cough. Is wanting something call in for her. [facility] [address]. Please call   pt to advise.  ---------------------------------------------------------------------------  --------------  CALL BACK INFO  What is the best way for the office to contact you? OK to leave message on   voicemail  Preferred Call Back Phone Number? [phone]  ---------------------------------------------------------------------------  --------------  SCRIPT ANSWERS  Relationship to Patient?  Self

## 2021-12-20 NOTE — TELEPHONE ENCOUNTER
----- Message from Mumtaz Lal sent at 12/18/2021 10:58 AM EST -----  Subject: Message to Provider    QUESTIONS  Information for Provider? Pt is running a low grade fever, headache and   cough. Is wanting something call in for her. [facility] on [address]. Please call   pt to advise.  ---------------------------------------------------------------------------  --------------  CALL BACK INFO  What is the best way for the office to contact you? OK to leave message on   voicemail  Preferred Call Back Phone Number? [phone]  ---------------------------------------------------------------------------  --------------  SCRIPT ANSWERS  Relationship to Patient?  Self

## 2021-12-20 NOTE — PROGRESS NOTES
ATR/UTR/LMOVM advised  is out of the clinic until 12.27.2021   Advised since she has URI SX I strongly recommend she go to [facility] so they can eval and do Covid testing.

## 2021-12-20 NOTE — TELEPHONE ENCOUNTER
ATR/UTR/LMOVM  Advised DR. Marley Hammond is out of the clinic until 12.27.2021   Advised since she is having URI sx she should go to Urgent Care so they can eval and do a covid test.

## 2022-01-19 DIAGNOSIS — E55.9 VITAMIN D DEFICIENCY: ICD-10-CM

## 2022-01-19 DIAGNOSIS — I10 ESSENTIAL HYPERTENSION: ICD-10-CM

## 2022-01-19 RX ORDER — ERGOCALCIFEROL 1.25 MG/1
CAPSULE ORAL
Qty: 12 CAPSULE | Refills: 0 | Status: SHIPPED | OUTPATIENT
Start: 2022-01-19 | End: 2022-05-01

## 2022-01-19 RX ORDER — IRBESARTAN AND HYDROCHLOROTHIAZIDE 150; 12.5 MG/1; MG/1
TABLET, FILM COATED ORAL
Qty: 90 TABLET | Refills: 0 | Status: SHIPPED | OUTPATIENT
Start: 2022-01-19 | End: 2022-05-01

## 2022-02-26 DIAGNOSIS — E78.5 HYPERLIPIDEMIA WITH TARGET LDL LESS THAN 100: ICD-10-CM

## 2022-02-26 RX ORDER — ATORVASTATIN CALCIUM 20 MG/1
TABLET, FILM COATED ORAL
Qty: 90 TABLET | Refills: 1 | Status: SHIPPED | OUTPATIENT
Start: 2022-02-26 | End: 2022-08-05

## 2022-03-08 ENCOUNTER — OFFICE VISIT (OUTPATIENT)
Dept: FAMILY MEDICINE CLINIC | Age: 62
End: 2022-03-08
Payer: COMMERCIAL

## 2022-03-08 VITALS
SYSTOLIC BLOOD PRESSURE: 122 MMHG | OXYGEN SATURATION: 100 % | RESPIRATION RATE: 16 BRPM | HEART RATE: 68 BPM | DIASTOLIC BLOOD PRESSURE: 62 MMHG | BODY MASS INDEX: 29.02 KG/M2 | WEIGHT: 163.8 LBS | HEIGHT: 63 IN | TEMPERATURE: 97.7 F

## 2022-03-08 DIAGNOSIS — Z79.4 CONTROLLED TYPE 2 DIABETES MELLITUS WITH COMPLICATION, WITH LONG-TERM CURRENT USE OF INSULIN (HCC): Primary | ICD-10-CM

## 2022-03-08 DIAGNOSIS — H61.23 BILATERAL IMPACTED CERUMEN: ICD-10-CM

## 2022-03-08 DIAGNOSIS — E78.5 HYPERLIPIDEMIA WITH TARGET LDL LESS THAN 100: ICD-10-CM

## 2022-03-08 DIAGNOSIS — I10 ESSENTIAL HYPERTENSION: ICD-10-CM

## 2022-03-08 DIAGNOSIS — E55.9 VITAMIN D DEFICIENCY: ICD-10-CM

## 2022-03-08 DIAGNOSIS — E11.8 CONTROLLED TYPE 2 DIABETES MELLITUS WITH COMPLICATION, WITH LONG-TERM CURRENT USE OF INSULIN (HCC): Primary | ICD-10-CM

## 2022-03-08 LAB — HBA1C MFR BLD HPLC: 5.4 %

## 2022-03-08 PROCEDURE — 3044F HG A1C LEVEL LT 7.0%: CPT | Performed by: FAMILY MEDICINE

## 2022-03-08 PROCEDURE — 83036 HEMOGLOBIN GLYCOSYLATED A1C: CPT | Performed by: FAMILY MEDICINE

## 2022-03-08 PROCEDURE — 99214 OFFICE O/P EST MOD 30 MIN: CPT | Performed by: FAMILY MEDICINE

## 2022-03-08 PROCEDURE — 69210 REMOVE IMPACTED EAR WAX UNI: CPT | Performed by: FAMILY MEDICINE

## 2022-03-08 RX ORDER — METFORMIN HYDROCHLORIDE 500 MG/1
500 TABLET, EXTENDED RELEASE ORAL
Qty: 90 TABLET | Refills: 1 | Status: SHIPPED | OUTPATIENT
Start: 2022-03-08

## 2022-03-08 NOTE — PROGRESS NOTES
Chief Complaint   Patient presents with    Diabetes     follow up    Ear Pain         1. \"Have you been to the ER, urgent care clinic since your last visit? Hospitalized since your last visit? \" No    2. \"Have you seen or consulted any other health care providers outside of the [address] [place] [address] since your last visit? \" No     3. For patients over 45: Has the patient had a colonoscopy? Yes - no Care Gap present     If the patient is female:    4. For patients over 40: Has the patient had a mammogram? Yes - no Care Gap present    5. For patients over 21: Has the patient had a pap smear?  Yes - no Care Gap present       3 most recent PHQ Screens 3/8/2022   Little interest or pleasure in doing things Not at all   Feeling down, depressed, irritable, or hopeless Not at all   Total Score PHQ 2 0       Health Maintenance Due   Topic Date Due    COVID-19 Vaccine (2 - Booster for Justice series) 05/15/2021    DTaP/Tdap/Td series (2 - Td or Tdap) 12/02/2021

## 2022-03-08 NOTE — PROGRESS NOTES
HPI  Anu Reach 64 y.o. female  presents to the office today for a follow up. Pt is looking forward to her upcoming travels. Ears: Pt c/o excessive ear wax production and some hearing loss. Hot Flashes: She continues to have hot flashes. Pt stopped taking medication to treat this due to it causing to anxiety. T2DM: Today A1c was 5.4%. At home glucose this morning was 87. Pt has been compliant taking Metformin 750mg daily and Victoza 0.6ml/0.1ml daily. Hypertension: Pt is compliant taking Avalide 150-12.5mg daily. Hyperlipidemia: Lipid panel on 11/11/21 notable for total cholesterol 185, HDL 75, .2, and triglycerides 39. Pt continues with atorvastatin 20mg daily. Blood pressure 122/62, pulse 68, temperature 97.7 °F (36.5 °C), temperature source Temporal, resp. rate 16, height 5' 3\" (1.6 m), weight 163 lb 12.8 oz (74.3 kg), last menstrual period 06/30/2013, SpO2 100 %. Body mass index is 29.02 kg/m². Chief Complaint   Patient presents with    Diabetes     follow up    Ear Pain        Lab Results   Component Value Date/Time    Cholesterol, total 185 11/11/2021 12:11 PM    HDL Cholesterol 75 11/11/2021 12:11 PM    LDL, calculated 102.2 (H) 11/11/2021 12:11 PM    VLDL, calculated 7.8 11/11/2021 12:11 PM    Triglyceride 39 11/11/2021 12:11 PM    CHOL/HDL Ratio 2.5 11/11/2021 12:11 PM       Current Outpatient Medications   Medication Sig Dispense Refill    metFORMIN ER (GLUCOPHAGE XR) 500 mg tablet Take 1 Tablet by mouth daily (with dinner).  90 Tablet 1    atorvastatin (LIPITOR) 20 mg tablet TAKE 1 TABLET BY MOUTH EVERY DAY 90 Tablet 1    irbesartan-hydroCHLOROthiazide (AVALIDE) 150-12.5 mg per tablet TAKE 1 TABLET BY MOUTH EVERY DAY 90 Tablet 0    ergocalciferol (ERGOCALCIFEROL) 1,250 mcg (50,000 unit) capsule TAKE 1 CAPSULE BY MOUTH ONE TIME PER WEEK 12 Capsule 0    BD Sonia 2nd Gen Pen Needle 32 gauge x 5/32\" ndle USE AS DIRECTED ONCE DAILY 100 Pen Needle 11    fluticasone propionate (FLONASE) 50 mcg/actuation nasal spray INSTILL 2 SPRAYS EVEYR DAY UNTIL DIRECTED TO STOP 1 Bottle 5    liraglutide (Victoza 3-Darryn) 0.6 mg/0.1 mL (18 mg/3 mL) pnij 0.6 mg by SubCUTAneous route daily. INJECT 1.8 MG BY SUBCUTANEOUS ROUTE DAILY. 1 Adjustable Dose Pre-filled Pen Syringe 5    glucose blood VI test strips (ACCU-CHEK OSCAR PLUS TEST STRP) strip CHECK BLOOD SUGAR DAILY DX:E11.9 150 Strip 1    Blood-Glucose Meter (ACCU-CHEK OSCAR PLUS METER) misc Check blood sugar daily DX:E11.9 1 Each 0    atovaquone-proguaniL (MALARONE) 250-100 mg per tablet Take 2 days prior to exposure and take 2 days after exposure (Patient not taking: Reported on 3/8/2022) 30 Tablet 0    Insulin Needles, Disposable, (BD Ultra-Fine Micro Pen Needle) 32 gauge x 1/4\" ndle USE AS DIRECTED ONCE DAILY 100 Pen Needle 3     Allergies   Allergen Reactions    Pcn [Penicillins] Rash     Past Medical History:   Diagnosis Date    Diabetes ([facility] [address]     Hypercholesterolemia     Hypertension      History reviewed. No pertinent surgical history. Family History   Problem Relation Age of Onset    Diabetes Mother     Diabetes Father     Diabetes Maternal Aunt     Diabetes Maternal Grandmother     Diabetes Maternal Grandfather     Diabetes Paternal Grandmother     Diabetes Paternal Grandfather      Social History     Tobacco Use    Smoking status: Never Smoker    Smokeless tobacco: Never Used   Substance Use Topics    Alcohol use: No        Review of Systems   Constitutional: Negative for chills and fever. Hot flashes   HENT: Positive for ear discharge and hearing loss. Negative for tinnitus. Eyes: Negative for blurred vision and double vision. Respiratory: Negative for cough and shortness of breath. Cardiovascular: Negative for chest pain and palpitations. Gastrointestinal: Negative for nausea and vomiting. Genitourinary: Negative for dysuria and frequency.    Musculoskeletal: Negative for back pain and falls. Skin: Negative for itching and rash. Neurological: Negative for dizziness, loss of consciousness and headaches. Endo/Heme/Allergies: Negative. Psychiatric/Behavioral: Negative for depression. The patient is not nervous/anxious. Physical Exam  Constitutional:       Appearance: Normal appearance. HENT:      Head: Normocephalic and atraumatic. Right Ear: External ear normal.      Left Ear: External ear normal.      Ears:      Comments: Bilateral cerumen impaction      Nose: Nose normal.      Mouth/Throat:      Mouth: Mucous membranes are moist.      Pharynx: Oropharynx is clear. Eyes:      Extraocular Movements: Extraocular movements intact. Conjunctiva/sclera: Conjunctivae normal.      Pupils: Pupils are equal, round, and reactive to light. Cardiovascular:      Rate and Rhythm: Normal rate and regular rhythm. Pulses: Normal pulses. Heart sounds: Normal heart sounds. Pulmonary:      Effort: Pulmonary effort is normal.      Breath sounds: Normal breath sounds. Abdominal:      General: Abdomen is flat. Bowel sounds are normal.      Palpations: Abdomen is soft. Genitourinary:     General: Normal vulva. Rectum: Normal.   Musculoskeletal:         General: Normal range of motion. Skin:     General: Skin is warm and dry. Neurological:      General: No focal deficit present. Mental Status: She is alert and oriented to person, place, and time. Mental status is at baseline. Psychiatric:         Mood and Affect: Mood normal.         Behavior: Behavior normal.         Judgment: Judgment normal.           ASSESSMENT and PLAN  Diagnoses and all orders for this visit:    1. Controlled type 2 diabetes mellitus with complication, with long-term current use of insulin ([facility])  -     AMB POC HEMOGLOBIN A1C  -     metFORMIN ER (GLUCOPHAGE XR) 500 mg tablet; Take 1 Tablet by mouth daily (with dinner). -     METABOLIC PANEL, COMPREHENSIVE; Future   A1c was 5.4% today. Pt advised to decrease Metformin from 750mg to 500mg daily. For now, continue with Victoza 0.6ml/0.1ml and will consider decreasing at next visit. Recheck CMP. 2. Essential hypertension  -     LIPID PANEL; Future  Stable. Continue on Avalide 150-12.5mg daily. Recheck lipid panel. 3. Hyperlipidemia with target LDL less than 100  Continue on atorvastatin 20m daily. Pt encouraged to increase cardiovascular exercise and decrease fat in diet. Recheck lipid panel. 4. Vitamin D deficiency  -     VITAMIN D, 25 HYDROXY; Future  Continue taking Vit D supplement. Recheck labs. 5. Bilateral impacted cerumen  -     REMOVE IMPACTED EAR WAX  Pt's ears flushed in office today. Follow-up and Dispositions    · Return in about 4 months (around 7/8/2022) for diabetes follow up. Medication risks/benefits/costs/interactions/alternatives discussed with patient. Advised patient to call back or return to office if symptoms worsen/change/persist.  If patient cannot reach us or should anything more severe/urgent arise he/she should proceed directly to the nearest emergency department. Discussed expected course/resolution/complications of diagnosis in detail with patient. Patient given a written after visit summary which includes diagnoses, current medications and vitals. Patient expressed understanding with the diagnosis and plan. Written by valencia Mata, as dictated by Adryan Adorno M.D.    2:39 PM - 3:17 PM      Total time spent with the patient 20 minutes, greater than 50% of time spent counseling patient.

## 2022-03-19 PROBLEM — E66.01 OBESITY, MORBID (HCC): Status: ACTIVE | Noted: 2018-01-22

## 2022-04-30 DIAGNOSIS — E55.9 VITAMIN D DEFICIENCY: ICD-10-CM

## 2022-04-30 DIAGNOSIS — I10 ESSENTIAL HYPERTENSION: ICD-10-CM

## 2022-05-01 RX ORDER — IRBESARTAN AND HYDROCHLOROTHIAZIDE 150; 12.5 MG/1; MG/1
TABLET, FILM COATED ORAL
Qty: 90 TABLET | Refills: 0 | Status: SHIPPED | OUTPATIENT
Start: 2022-05-01 | End: 2022-08-05

## 2022-05-01 RX ORDER — ERGOCALCIFEROL 1.25 MG/1
CAPSULE ORAL
Qty: 12 CAPSULE | Refills: 0 | Status: SHIPPED | OUTPATIENT
Start: 2022-05-01 | End: 2022-10-11

## 2022-08-05 DIAGNOSIS — E78.5 HYPERLIPIDEMIA WITH TARGET LDL LESS THAN 100: ICD-10-CM

## 2022-08-05 DIAGNOSIS — I10 ESSENTIAL HYPERTENSION: ICD-10-CM

## 2022-08-05 RX ORDER — IRBESARTAN AND HYDROCHLOROTHIAZIDE 150; 12.5 MG/1; MG/1
TABLET, FILM COATED ORAL
Qty: 90 TABLET | Refills: 0 | Status: SHIPPED | OUTPATIENT
Start: 2022-08-05

## 2022-08-05 RX ORDER — ATORVASTATIN CALCIUM 20 MG/1
TABLET, FILM COATED ORAL
Qty: 90 TABLET | Refills: 1 | Status: SHIPPED | OUTPATIENT
Start: 2022-08-05

## 2022-10-11 ENCOUNTER — OFFICE VISIT (OUTPATIENT)
Dept: FAMILY MEDICINE CLINIC | Age: 62
End: 2022-10-11

## 2022-10-11 VITALS
HEIGHT: 63 IN | HEART RATE: 74 BPM | BODY MASS INDEX: 29.41 KG/M2 | TEMPERATURE: 96.8 F | SYSTOLIC BLOOD PRESSURE: 122 MMHG | DIASTOLIC BLOOD PRESSURE: 60 MMHG | OXYGEN SATURATION: 100 % | WEIGHT: 166 LBS | RESPIRATION RATE: 16 BRPM

## 2022-10-11 DIAGNOSIS — I10 ESSENTIAL HYPERTENSION: ICD-10-CM

## 2022-10-11 DIAGNOSIS — Z71.89 ACP (ADVANCE CARE PLANNING): ICD-10-CM

## 2022-10-11 DIAGNOSIS — E78.5 HYPERLIPIDEMIA WITH TARGET LDL LESS THAN 100: ICD-10-CM

## 2022-10-11 DIAGNOSIS — Z71.84 COUNSELING FOR TRAVEL: ICD-10-CM

## 2022-10-11 DIAGNOSIS — M25.512 ACUTE PAIN OF LEFT SHOULDER: Primary | ICD-10-CM

## 2022-10-11 DIAGNOSIS — E11.9 CONTROLLED TYPE 2 DIABETES MELLITUS WITHOUT COMPLICATION, WITHOUT LONG-TERM CURRENT USE OF INSULIN (HCC): ICD-10-CM

## 2022-10-11 DIAGNOSIS — M67.912 ROTATOR CUFF DISORDER, LEFT: ICD-10-CM

## 2022-10-11 PROCEDURE — 83036 HEMOGLOBIN GLYCOSYLATED A1C: CPT | Performed by: FAMILY MEDICINE

## 2022-10-11 PROCEDURE — 3044F HG A1C LEVEL LT 7.0%: CPT | Performed by: FAMILY MEDICINE

## 2022-10-11 PROCEDURE — 99214 OFFICE O/P EST MOD 30 MIN: CPT | Performed by: FAMILY MEDICINE

## 2022-10-11 RX ORDER — DICLOFENAC SODIUM 75 MG/1
75 TABLET, DELAYED RELEASE ORAL 2 TIMES DAILY
Qty: 60 TABLET | Refills: 0 | Status: SHIPPED | OUTPATIENT
Start: 2022-10-11

## 2022-10-11 RX ORDER — LIRAGLUTIDE 6 MG/ML
0.6 INJECTION SUBCUTANEOUS DAILY
Qty: 4 EACH | Refills: 2 | Status: SHIPPED | OUTPATIENT
Start: 2022-10-11

## 2022-10-11 RX ORDER — ATOVAQUONE AND PROGUANIL HYDROCHLORIDE 250; 100 MG/1; MG/1
TABLET, FILM COATED ORAL
Qty: 30 TABLET | Refills: 0 | Status: SHIPPED | OUTPATIENT
Start: 2022-10-11

## 2022-10-11 RX ORDER — CIPROFLOXACIN 500 MG/1
500 TABLET ORAL 2 TIMES DAILY
Qty: 20 TABLET | Refills: 0 | Status: SHIPPED | OUTPATIENT
Start: 2022-10-11 | End: 2022-10-21

## 2022-10-11 NOTE — PROGRESS NOTES
HPI  Dayana Ravenna 58 y.o. female  presents to the office today to discuss shoulder pain. Blood pressure 122/60, pulse 74, temperature 96.8 °F (36 °C), temperature source Temporal, resp. rate 16, height 5' 3\" (1.6 m), weight 166 lb (75.3 kg), last menstrual period 06/30/2013, SpO2 100 %. Body mass index is 29.41 kg/m². Chief Complaint   Patient presents with    Shoulder Pain      Left Shoulder Pain: Pt reports limited ROM and pain of left shoulder. This is affecting her sleep and ability to get dressed. Pain is 5-6/10 when raising the shoulder. Movement aggravates the pain, rest relieves it. Pain radiates down arm. She is unsure what may have caused this. She has tried applying lidocaine patches and Advil/Aleve. DM2: A1c per POC today 6.1, up from A1c of 5.4 on 3/8/22. Pt continues with Metformin 500mg daily and Victoza 0.6ml/0.1ml daily. Hypertension: BP at office today 122/60. Pt continues with Avalide 150-12.5mg daily. Hyperlipidemia: Lipid panel on 5/2/22 notable for total cholesterol 177, HDL 83, LDL 87.6, and triglycerides 32. Pt continues with atorvastatin 20mg daily. Counseling for travel: Pt recently returned from a trip in [place]. She is going to [facility] ([facility] [place]) next month. She would like a refill of Malarone 250-100mg. Health Maintenance:   Pt had mammogram. I will have my nurse requests these records. Pt due for pap. She plans to schedule this with Dr. Liliane Stokes. Pt plans to get updated COVID booster. Current Outpatient Medications   Medication Sig Dispense Refill    atovaquone-proguaniL (MALARONE) 250-100 mg per tablet Take 2 days prior to exposure and take 2 days after exposure 30 Tablet 0    liraglutide (Victoza 3-Darryn) 0.6 mg/0.1 mL (18 mg/3 mL) pnij 0.6 mg by SubCUTAneous route daily. INJECT 1.8 MG BY SUBCUTANEOUS ROUTE DAILY. 4 Each 2    ciprofloxacin HCl (CIPRO) 500 mg tablet Take 1 Tablet by mouth two (2) times a day for 10 days.  20 Tablet 0    atorvastatin (LIPITOR) 20 mg tablet TAKE 1 TABLET BY MOUTH EVERY DAY 90 Tablet 1    irbesartan-hydroCHLOROthiazide (AVALIDE) 150-12.5 mg per tablet TAKE 1 TABLET BY MOUTH EVERY DAY 90 Tablet 0    metFORMIN ER (GLUCOPHAGE XR) 500 mg tablet Take 1 Tablet by mouth daily (with dinner). 90 Tablet 1    BD Sonia 2nd Gen Pen Needle 32 gauge x 5/32\" ndle USE AS DIRECTED ONCE DAILY 100 Pen Needle 11    fluticasone propionate (FLONASE) 50 mcg/actuation nasal spray INSTILL 2 SPRAYS EVEYR DAY UNTIL DIRECTED TO STOP 1 Bottle 5    glucose blood VI test strips (ACCU-CHEK OSCAR PLUS TEST STRP) strip CHECK BLOOD SUGAR DAILY DX:E11.9 150 Strip 1    Blood-Glucose Meter (ACCU-CHEK OSCAR PLUS METER) misc Check blood sugar daily DX:E11.9 1 Each 0     Allergies   Allergen Reactions    Pcn [Penicillins] Rash     Past Medical History:   Diagnosis Date    Diabetes ([facility] [address]     Hypercholesterolemia     Hypertension      History reviewed. No pertinent surgical history. Family History   Problem Relation Age of Onset    Diabetes Mother     Diabetes Father     Diabetes Maternal Aunt     Diabetes Maternal Grandmother     Diabetes Maternal Grandfather     Diabetes Paternal Grandmother     Diabetes Paternal Grandfather      Social History     Tobacco Use    Smoking status: Never    Smokeless tobacco: Never   Substance Use Topics    Alcohol use: No        Review of Systems   Constitutional:  Negative for chills and fever. HENT:  Negative for hearing loss and tinnitus. Eyes:  Negative for blurred vision and double vision. Respiratory:  Negative for cough and shortness of breath. Cardiovascular:  Negative for chest pain and palpitations. Gastrointestinal:  Negative for nausea and vomiting. Genitourinary:  Negative for dysuria and frequency. Musculoskeletal:  Positive for joint pain (left shoulder). Negative for back pain and falls. Skin:  Negative for itching and rash. Neurological:  Negative for dizziness, loss of consciousness and headaches. Endo/Heme/Allergies: Negative. Psychiatric/Behavioral:  Negative for depression. The patient is not nervous/anxious. Physical Exam  Vitals and nursing note reviewed. Constitutional:       General: She is not in acute distress. Appearance: She is well-developed. She is not diaphoretic. HENT:      Head: Normocephalic and atraumatic. Cardiovascular:      Rate and Rhythm: Normal rate and regular rhythm. Heart sounds: Normal heart sounds. No murmur heard. No friction rub. No gallop. Pulmonary:      Effort: Pulmonary effort is normal. No respiratory distress. Breath sounds: Normal breath sounds. No wheezing or rales. Chest:      Chest wall: No tenderness. Abdominal:      General: Bowel sounds are normal. There is no distension. Palpations: Abdomen is soft. Tenderness: There is no abdominal tenderness. Musculoskeletal:      Cervical back: Normal range of motion and neck supple. Comments: Left shoulder is Cha Neer Positive    Neurological:      Mental Status: She is alert and oriented to person, place, and time. Psychiatric:         Behavior: Behavior normal.         Thought Content: Thought content normal.         Judgment: Judgment normal.       Diabetic foot exam:     Left Foot:   Visual Exam: normal    Pulse DP: 2+ (normal)   Filament test: normal sensation    Vibratory sensation: normal      Right Foot:   Visual Exam: normal    Pulse DP: 2+ (normal)   Filament test: normal sensation    Vibratory sensation: normal          ASSESSMENT and PLAN  Diagnoses and all orders for this visit:    1. Acute pain of left shoulder  I have ordered an x-ray and MRI of the left shoulder for further assessment. I prescribed Voltaren 75mg BID. I told her that she must take this medication with food. I advised pt to pt try different stretching exercises. We discussed \"spider crawls\" on wall.    -     XR SHOULDER LT AP/LAT MIN 2 V; Future       -     diclofenac EC (VOLTAREN) 75 mg EC tablet; Take 1 Tablet by mouth two (2) times a day. 2. Counseling for travel  Pt is traveling to [facility] ([place] next month. I prescribed Malarone 250-100mg and cipro 500mg to take if needed while abroad. -     atovaquone-proguaniL (MALARONE) 250-100 mg per tablet; Take 2 days prior to exposure and take 2 days after exposure    3. Controlled type 2 diabetes mellitus without complication, without long-term current use of insulin (HCC)  Continue current regimen. Pt requests a refill of their medication, which I have granted. Pt will have updated lab work performed during today's OV.   -     MICROALBUMIN, UR, RAND W/ MICROALB/CREAT RATIO; Future  -     AMB POC HEMOGLOBIN A1C  -     liraglutide (Victoza 3-Darryn) 0.6 mg/0.1 mL (18 mg/3 mL) pnij; 0.6 mg by SubCUTAneous route daily. INJECT 1.8 MG BY SUBCUTANEOUS ROUTE DAILY. -      DIABETES FOOT EXAM    4. Rotator cuff disorder, left  See #1.   -     MRI SHOULDER LT WO CONT; Future    5. ACP (advance care planning)  I have provided pt with a referral to an advance care specialist for assistance with their forms.   -     REFERRAL TO ACP CLINICAL SPECIALIST    6. Essential hypertension  Continue current regimen. Pt will have updated lab work performed during today's OV.   -     LIPID PANEL; Future  -     METABOLIC PANEL, COMPREHENSIVE; Future    7. Hyperlipidemia with target LDL less than 100  Continue current regimen. Other orders  -     ciprofloxacin HCl (CIPRO) 500 mg tablet; Take 1 Tablet by mouth two (2) times a day for 10 days. Follow-up and Dispositions    Return in about 6 months (around 4/11/2023) for hypertension, diabetes, cholesterol follow up. Medication risks/benefits/costs/interactions/alternatives discussed with patient.   Advised patient to call back or return to office if symptoms worsen/change/persist.  If patient cannot reach us or should anything more severe/urgent arise he/she should proceed directly to the nearest emergency department. Discussed expected course/resolution/complications of diagnosis in detail with patient. Patient given a written after visit summary which includes diagnoses, current medications and vitals. Patient expressed understanding with the diagnosis and plan. Written by valencia Fregoso, as dictated by Lucrecia Lemus M.D.    4:00 PM - 4:20 PM    Total time spent with the patient 20 minutes, greater than 50% of time spent counseling patient.

## 2022-10-12 ENCOUNTER — PATIENT OUTREACH (OUTPATIENT)
Dept: CASE MANAGEMENT | Age: 62
End: 2022-10-12

## 2022-10-12 LAB
ALBUMIN SERPL-MCNC: 4.3 G/DL (ref 3.5–5)
ALBUMIN/GLOB SERPL: 1.3 {RATIO} (ref 1.1–2.2)
ALP SERPL-CCNC: 73 U/L (ref 45–117)
ALT SERPL-CCNC: 20 U/L (ref 12–78)
ANION GAP SERPL CALC-SCNC: 4 MMOL/L (ref 5–15)
AST SERPL-CCNC: 14 U/L (ref 15–37)
BILIRUB SERPL-MCNC: 0.4 MG/DL (ref 0.2–1)
BUN SERPL-MCNC: 20 MG/DL (ref 6–20)
BUN/CREAT SERPL: 20 (ref 12–20)
CALCIUM SERPL-MCNC: 9.7 MG/DL (ref 8.5–10.1)
CHLORIDE SERPL-SCNC: 105 MMOL/L (ref 97–108)
CHOLEST SERPL-MCNC: 199 MG/DL
CO2 SERPL-SCNC: 30 MMOL/L (ref 21–32)
CREAT SERPL-MCNC: 1 MG/DL (ref 0.55–1.02)
CREAT UR-MCNC: 195 MG/DL
GLOBULIN SER CALC-MCNC: 3.3 G/DL (ref 2–4)
GLUCOSE SERPL-MCNC: 125 MG/DL (ref 65–100)
HDLC SERPL-MCNC: 85 MG/DL
HDLC SERPL: 2.3 {RATIO} (ref 0–5)
LDLC SERPL CALC-MCNC: 105.4 MG/DL (ref 0–100)
MICROALBUMIN UR-MCNC: 1.11 MG/DL
MICROALBUMIN/CREAT UR-RTO: 6 MG/G (ref 0–30)
POTASSIUM SERPL-SCNC: 4.6 MMOL/L (ref 3.5–5.1)
PROT SERPL-MCNC: 7.6 G/DL (ref 6.4–8.2)
SODIUM SERPL-SCNC: 139 MMOL/L (ref 136–145)
TRIGL SERPL-MCNC: 43 MG/DL (ref ?–150)
VLDLC SERPL CALC-MCNC: 8.6 MG/DL

## 2022-10-12 NOTE — ACP (ADVANCE CARE PLANNING)
Advance Care Planning   Ambulatory ACP Specialist Patient Outreach    Date:  10/12/2022 10/18/2022 10/26/22      ACP Specialist:  Emperatriz Robles LPN    Outreach call to patient in follow-up to ACP Specialist referral from:    [x] PCP  [] Provider   [] Ambulatory Care Management [] Other     For:                  [x] Advance Directive Assistance              [] Complete Portable DNR order              [] Complete POST/MOST              [] Code Status Discussion             [] Discuss Goals of Care             [] Early ACP Decision-Making              [] Other (Specify)    Date Referral Received: 10/11/22    Today's Outreach:  [x] First   [x] Second  [x] Third       Third outreach made by: [] Phone  [] Email / mail    [] Outsell     Intervention:  [x] Spoke with Patient   [x] Left VM requesting return call      Outcome:  Spoke with the patient who wished to review ACP documents before scheduling an appointment. Will e-mail documents to the patient and follow up within one week. Next Step:   [x] ACP scheduled conversation  [x] Outreach again in one week               [x] Email / Mail ACP Info Sheets  [] Email / Mail Advance Directive   [] Closing referral.  Routing closure to referring provider/staff and to ACP Specialist . [] Closure letter mailed to patient with invitation to contact ACP Specialist if / when ready. Thank you for this referral.    10/18/22  Attempted to contact the pt to confirm receipt of ACP documents that were e-mailed for review. No answer on mobile phone. Vm left requesting a return call. Will follow up again within one week.        10/26/22  Pt reviewed ACP material that was e-mailed to her for review and she wishes to move forward in having an ACP conversation with an ACP specialist. Appointment scheduled for 11/1/22 at 10 am.

## 2022-10-14 LAB — HBA1C MFR BLD HPLC: 6.1 %

## 2022-10-18 ENCOUNTER — PATIENT OUTREACH (OUTPATIENT)
Dept: CASE MANAGEMENT | Age: 62
End: 2022-10-18

## 2022-10-26 ENCOUNTER — PATIENT OUTREACH (OUTPATIENT)
Dept: CASE MANAGEMENT | Age: 62
End: 2022-10-26

## 2022-11-01 ENCOUNTER — DOCUMENTATION ONLY (OUTPATIENT)
Dept: CASE MANAGEMENT | Age: 62
End: 2022-11-01

## 2022-11-02 NOTE — ACP (ADVANCE CARE PLANNING)
Advance Care Planning     Advance Care Planning Clinical Specialist  Conversation Note      Date of ACP Conversation: 11/29/22; 11/11/2022; 11/18/2022; 11/29/2022    Conversation Conducted with:  Patient with Decision Making Capacity    ACP Clinical Specialist: Loreto Jaffe, [address] Decision Maker:    Current Designated Health Care Decision Maker:     Primary Decision Maker: Angi Hartman Child - [phone]    Primary Decision Maker: Russell Montoya - Daughter - [phone]  Today we discussed patient's healthcare wishes and possible decision makers     Care Preferences    Hospitalization: \"If your health worsens and it becomes clear that your chance of recovery is unlikely, what would your preference be regarding hospitalization? \"    Choice:  []  The patient wants hospitalization  [x]  The patient prefers comfort-focused treatment without hospitalization. Ventilation: \"If you were in your present state of health and suddenly became very ill and were unable to breathe on your own, what would your preference be about the use of a ventilator (breathing machine) if it were available to you? \"      If patient would desire the use of a ventilator (breathing machine), answer \"yes\", if not \"no\":Yes- but not for long term     \"If your health worsens and it becomes clear that your chance of recovery is unlikely, what would your preference be about the use of a ventilator (breathing machine) if it were available to you? \"     Would the patient desire the use of a ventilator (breathing machine)? No      Resuscitation  \"CPR works best to restart the heart when there is a sudden event, like a heart attack, in someone who is otherwise healthy. Unfortunately, CPR does not typically restart the heart for people who have serious health conditions or who are very sick. \"    \"In the event your heart stopped as a result of an underlying serious health condition, would you want attempts to be made to restart your heart (answer \"yes\" for attempt to resuscitate) or would you prefer a natural death (answer \"no\" for do not attempt to resuscitate)? \" Yes- if pt has a chance of recovery pt would want an attempt made. [x] Yes  [] No   Educated Patient / Alena Dumont regarding differences between Advance Directives and portable DNR orders. Length of ACP Conversation in minutes:  39    Conversation Outcomes:  [x] ACP discussion started   [] Existing advance directive reviewed with patient; no changes to patient's previously recorded wishes   [x] New Advance Directive draft sent to patient for review    [] Portable Do Not Resuscitate prepared for Provider review and signature  [] POLST/POST/MOLST/MOST prepared for Provider review and signature      Follow-up plan:    [x] Schedule follow-up conversation to continue planning  [] Referred individual to Provider for additional questions/concerns   [x] Advised patient/agent/surrogate to review completed ACP document and update if needed with changes in condition, patient preferences or care setting     [x] This note routed to one or more involved healthcare providers    11/1  RENETTA called and spoke with pt about her healthcare wishes. Pt was unsure about her wishes around wanting all treatments or not wanting all treatments if her condition was imminently terminal.   Pt initially stated that she wouldn't want any treatments but after completing a conversation pt said that she really needs to think about her wishes and which child she would want as her primary agent. Pt's Draft AMD has Kyra Albert as primary and Elzakia Belchering as secondary.   Pt stated that she may need to reverse that and have Pau Villanueva as Primary and Akash An as secondary but she wants to talk with them both first.  Pt understands that both dtrs will share primary decision making status until she designates one primary and the other secondary    PLAN:  RENETTA will e-mail pt a copy of pt's draft AMD for review with both daughters and plan a follow up next week. 11/11  SW called to try and follow up with to to see if she was ready to complete her document. However, SW got pt's voice mail again and had to leave a message. Will follow up again next week. 11/18  SW called to try and follow up with pt but was unable to reach her or leave a message. SW will attempt to follow up with pt again in two weeks. 11/29  SW called to try and follow up with pt again to see if she wanted to make any changes to draft AMD or if she wanted to proceed with that document. SW was unable to reach pt or leave a message. We will move to close this referral at this time. This patient has a copy of her AMD and knows how to reach out to team should she wish to move forward with completion in the future.

## 2022-11-11 ENCOUNTER — DOCUMENTATION ONLY (OUTPATIENT)
Dept: CASE MANAGEMENT | Age: 62
End: 2022-11-11

## 2022-11-18 ENCOUNTER — DOCUMENTATION ONLY (OUTPATIENT)
Dept: CASE MANAGEMENT | Age: 62
End: 2022-11-18

## 2022-11-29 ENCOUNTER — DOCUMENTATION ONLY (OUTPATIENT)
Dept: CASE MANAGEMENT | Age: 62
End: 2022-11-29

## 2023-01-24 ENCOUNTER — OFFICE VISIT (OUTPATIENT)
Dept: ORTHOPEDIC SURGERY | Age: 63
End: 2023-01-24
Payer: COMMERCIAL

## 2023-01-24 VITALS — HEIGHT: 63 IN | WEIGHT: 166 LBS | BODY MASS INDEX: 29.41 KG/M2

## 2023-01-24 DIAGNOSIS — M25.512 CHRONIC LEFT SHOULDER PAIN: Primary | ICD-10-CM

## 2023-01-24 DIAGNOSIS — M75.42 IMPINGEMENT SYNDROME OF LEFT SHOULDER: ICD-10-CM

## 2023-01-24 DIAGNOSIS — M75.102 ROTATOR CUFF TEAR, NON-TRAUMATIC, LEFT: ICD-10-CM

## 2023-01-24 DIAGNOSIS — G89.29 CHRONIC LEFT SHOULDER PAIN: Primary | ICD-10-CM

## 2023-01-24 DIAGNOSIS — M19.012 ARTHRITIS OF LEFT ACROMIOCLAVICULAR JOINT: ICD-10-CM

## 2023-01-24 DIAGNOSIS — M25.512 PAIN OF LEFT SHOULDER REGION: ICD-10-CM

## 2023-01-24 PROCEDURE — 99204 OFFICE O/P NEW MOD 45 MIN: CPT | Performed by: ORTHOPAEDIC SURGERY

## 2023-01-24 NOTE — PROGRESS NOTES
Burt Lugo (: 1960) is a 58 y.o. female, patient, here for evaluation of the following chief complaint(s):  Shoulder Pain (Left x 6 months)       HPI:    She began having increased left shoulder and upper extremity discomfort approximately 6 months ago. The patient states that her pain came on gradually. She reports no specific injury. She describes her pain now as moderate, sharp, dull, and intermittent. She states that her pain continues to get worse. She reports that lifting, twisting, and lying in bed make her pain worse. She has been taking diclofenac for discomfort as needed. The patient was not seen in the emergency room for discomfort. She does report previous x-rays. The patient reports no previous or related left shoulder or upper extremity surgery. Left shoulder x-rays performed prior to her visit today showed no evidence of a fracture or dislocation. Evidence of mild degenerative changes with impingement and AC joint arthritis. Allergies   Allergen Reactions    Pcn [Penicillins] Rash       Current Outpatient Medications   Medication Sig    metFORMIN ER (GLUCOPHAGE XR) 500 mg tablet TAKE 1 TABLET BY MOUTH EVERY DAY WITH DINNER    irbesartan-hydroCHLOROthiazide (AVALIDE) 150-12.5 mg per tablet TAKE 1 TABLET BY MOUTH EVERY DAY    Blood-Glucose Meter (Accu-Chek Cindy Plus Meter) misc Check blood sugar daily DX:E11.9. Use product covered by patient insurance. glucose blood VI test strips (Accu-Chek Cindy Plus test strp) strip CHECK BLOOD SUGAR DAILY DX:E11.9. Use product covered by patient insurance. Insulin Needles, Disposable, (BD Sonia 2nd Gen Pen Needle) 32 gauge x 5/32\" ndle USE AS DIRECTED ONCE DAILY. Use product covered by patient insurance. atorvastatin (LIPITOR) 20 mg tablet Take 1 Tablet by mouth daily.     atovaquone-proguaniL (MALARONE) 250-100 mg per tablet Take 2 days prior to exposure and take 2 days after exposure    liraglutide (Victoza 3-Darryn) 0.6 mg/0.1 mL (18 mg/3 mL) pnij 0.6 mg by SubCUTAneous route daily. INJECT 1.8 MG BY SUBCUTANEOUS ROUTE DAILY. diclofenac EC (VOLTAREN) 75 mg EC tablet Take 1 Tablet by mouth two (2) times a day. fluticasone propionate (FLONASE) 50 mcg/actuation nasal spray INSTILL 2 SPRAYS EVEYR DAY UNTIL DIRECTED TO STOP     No current facility-administered medications for this visit. Past Medical History:   Diagnosis Date    Diabetes ([facility] [address]     Hypercholesterolemia     Hypertension         History reviewed. No pertinent surgical history. Family History   Problem Relation Age of Onset    Diabetes Mother     Diabetes Father     Diabetes Maternal Aunt     Diabetes Maternal Grandmother     Diabetes Maternal Grandfather     Diabetes Paternal Grandmother     Diabetes Paternal Grandfather         Social History     Socioeconomic History    Marital status:      Spouse name: Not on file    Number of children: Not on file    Years of education: Not on file    Highest education level: Not on file   Occupational History    Not on file   Tobacco Use    Smoking status: Never    Smokeless tobacco: Never   Vaping Use    Vaping Use: Never used   Substance and Sexual Activity    Alcohol use: No    Drug use: No    Sexual activity: Yes     Partners: Male   Other Topics Concern    Not on file   Social History Narrative    Not on file     Social Determinants of Health     Financial Resource Strain: Not on file   Food Insecurity: Not on file   Transportation Needs: Not on file   Physical Activity: Not on file   Stress: Not on file   Social Connections: Not on file   Intimate Partner Violence: Not on file   Housing Stability: Not on file       Review of Systems   All other systems reviewed and are negative. Vitals:  Ht 5' 3\" (1.6 m)   Wt 166 lb (75.3 kg)   LMP 06/30/2013   BMI 29.41 kg/m²    Body mass index is 29.41 kg/m². Ortho Exam     The patient is well-developed and well-nourished.   The patient presents today in alert and oriented x3 with a normal mood and affect. The patient stands with a normal weightbearing line and walks with a normal gait. Left shoulder: No shoulder girdle atrophy. There is no soft tissue swelling, ecchymosis, abrasions, or lacerations. Active range of motion of the shoulder is limited to 130 degrees of forward flexion, 120 degrees of lateral abduction, and 70 degrees of external rotation. Internal rotation is to the SI joint and is painful. Passive range of motion is full with a painful impingement sign and painful Cha sign. Rotator cuff strength is painful to evaluate and is a 4+/5 with forward flexion and lateral abduction. External rotation strength is maintained. There is no crepitation about the joint. Palpation of the [facility] joint does reproduce discomfort and there is pain elicited with cross body abduction. Strength of the extremity is 5/5 strength at biceps/triceps/wrist extension and is comparable to the contralateral side. DTRs are intact at +2/4 and are symmetrical.  Cervical range of motion is full with no pain to palpation along the paraspinal musculature medial border of the scapula. Spurling's sign is negative. ASSESSMENT/PLAN:      1. Chronic left shoulder pain  2. Pain of left shoulder region  3. Rotator cuff tear, non-traumatic, left  4. Impingement syndrome of left shoulder  -     MRI SHOULDER LT WO CONT; Future  5. Arthritis of left acromioclavicular joint     Below is the assessment and plan developed based on review of pertinent history, physical exam, labs, studies, and medications. We discussed the patient's left shoulder and upper extremity discomfort and his signs, symptoms, physical exam, description of his pain, and x-rays are consistent with a rotator cuff tear, impingement, and AC joint arthritis.   The possible treatment options were discussed with the patient and because of the over 6-month long duration of her increased pain, no improvement with multiple modalities of conservative management including an at-home exercise program, her physical exam, description of her pain, x-rays performed at her primary care physician's office prior to her visit today, and her inability to complete daily living activities without significant discomfort we elected to obtain an MRI of her left shoulder to further evaluate the severity of her rotator cuff tear, impingement, and AC arthritis. The MRI images and results will be used in preoperative planning if and almost certainly when surgical intervention is necessary. The risks and benefits of the MRI were discussed in detail with the patient and she would like to proceed. We will schedule at her convenience. I will see her back after MRI is complete to discuss the images, results, and further treatment options. In the interim encouraged her to ice when possible, modify her activity level based on her left shoulder pain, and use anti-inflammatory medication when necessary. She will work on range of motion, strengthening, and stretching exercises with an at-home exercise program as pain tolerates. I will see her back as noted above after left shoulder MRI is complete. **We will obtain an MRI for more information to determine the best treatment plan moving forward and help us prepare for surgical intervention if necessary. **    Return for After her left shoulder MRI is complete. An electronic signature was used to authenticate this note.   -- Mercedes Saleem MD

## 2023-02-08 DIAGNOSIS — E11.9 CONTROLLED TYPE 2 DIABETES MELLITUS WITHOUT COMPLICATION, WITHOUT LONG-TERM CURRENT USE OF INSULIN (HCC): ICD-10-CM

## 2023-02-08 RX ORDER — LIRAGLUTIDE 6 MG/ML
INJECTION SUBCUTANEOUS
Qty: 9 ML | Refills: 2 | Status: SHIPPED | OUTPATIENT
Start: 2023-02-08

## 2023-02-27 ENCOUNTER — OFFICE VISIT (OUTPATIENT)
Dept: ORTHOPEDIC SURGERY | Age: 63
End: 2023-02-27
Payer: COMMERCIAL

## 2023-02-27 VITALS — HEIGHT: 63 IN | BODY MASS INDEX: 29.23 KG/M2 | WEIGHT: 165 LBS

## 2023-02-27 DIAGNOSIS — M19.012 ARTHRITIS OF LEFT ACROMIOCLAVICULAR JOINT: ICD-10-CM

## 2023-02-27 DIAGNOSIS — M75.42 SHOULDER IMPINGEMENT, LEFT: ICD-10-CM

## 2023-02-27 DIAGNOSIS — M75.02 ADHESIVE CAPSULITIS OF LEFT SHOULDER: ICD-10-CM

## 2023-02-27 DIAGNOSIS — G89.29 CHRONIC LEFT SHOULDER PAIN: Primary | ICD-10-CM

## 2023-02-27 DIAGNOSIS — M25.512 PAIN OF LEFT SHOULDER REGION: ICD-10-CM

## 2023-02-27 DIAGNOSIS — M75.82 ROTATOR CUFF TENDINITIS, LEFT: ICD-10-CM

## 2023-02-27 DIAGNOSIS — M25.512 CHRONIC LEFT SHOULDER PAIN: Primary | ICD-10-CM

## 2023-02-27 DIAGNOSIS — S43.432D LABRAL TEAR OF SHOULDER, LEFT, SUBSEQUENT ENCOUNTER: ICD-10-CM

## 2023-02-27 PROCEDURE — 99214 OFFICE O/P EST MOD 30 MIN: CPT | Performed by: ORTHOPAEDIC SURGERY

## 2023-02-27 RX ORDER — METHYLPREDNISOLONE 4 MG/1
4 TABLET ORAL
Qty: 1 DOSE PACK | Refills: 0 | Status: SHIPPED | OUTPATIENT
Start: 2023-02-27

## 2023-02-27 NOTE — PROGRESS NOTES
Linda Foss (: 1960) is a 58 y.o. female, patient, here for evaluation of the following chief complaint(s):  Shoulder Pain (Left, mri results)       HPI:    She was last seen for her left shoulder pain on 2023. Since then, the patient did have an MRI performed on her left shoulder on 2023. The patient states that her pain level is the same as it was at her last visit. She rates the severity of her left shoulder pain as a 4 out of 10. She describes pain as sharp, aching, and intermittent. Her left shoulder pain does make it difficult for her to go to sleep and does wake her up from sleep. The patient has been taking diclofenac for her discomfort as needed. She has been experiencing some weakness in her left shoulder. She has been working on range of motion, strengthening, and stretching exercises with an at-home exercise program.  She reports that both the anti-inflammatory medication and home exercises have helped reduce some of her left shoulder pain. Left shoulder MRI images and results were independently reviewed and they were consistent with adhesive capsulitis and the proper clinical setting. Mild rotator cuff tendinosis and bursal surface fraying. No full-thickness tear is demonstrated. Nondisplaced superior labral tear. Acromioclavicular osteophytes may cause subacromial impingement. Allergies   Allergen Reactions    Pcn [Penicillins] Rash       Current Outpatient Medications   Medication Sig    methylPREDNISolone (MEDROL DOSEPACK) 4 mg tablet Take 1 Tablet by mouth Specific Days and Specific Times.  Used as directed    Victoza 3-Darryn 0.6 mg/0.1 mL (18 mg/3 mL) pnij INJECT 0.6 MG SUBCUTANEOUSLY EVERY DAY THEN INCREASE TO 1.8 MG SUBCUTANEOUSLY EVERY DAY AS DIRECTED    metFORMIN ER (GLUCOPHAGE XR) 500 mg tablet TAKE 1 TABLET BY MOUTH EVERY DAY WITH DINNER    irbesartan-hydroCHLOROthiazide (AVALIDE) 150-12.5 mg per tablet TAKE 1 TABLET BY MOUTH EVERY DAY    Blood-Glucose Meter (Accu-Chek Cindy Plus Meter) misc Check blood sugar daily DX:E11.9. Use product covered by patient insurance. glucose blood VI test strips (Accu-Chek Cindy Plus test strp) strip CHECK BLOOD SUGAR DAILY DX:E11.9. Use product covered by patient insurance. Insulin Needles, Disposable, (BD Sonia 2nd Gen Pen Needle) 32 gauge x 5/32\" ndle USE AS DIRECTED ONCE DAILY. Use product covered by patient insurance. atorvastatin (LIPITOR) 20 mg tablet Take 1 Tablet by mouth daily. atovaquone-proguaniL (MALARONE) 250-100 mg per tablet Take 2 days prior to exposure and take 2 days after exposure    diclofenac EC (VOLTAREN) 75 mg EC tablet Take 1 Tablet by mouth two (2) times a day. fluticasone propionate (FLONASE) 50 mcg/actuation nasal spray INSTILL 2 SPRAYS EVEYR DAY UNTIL DIRECTED TO STOP     No current facility-administered medications for this visit. Past Medical History:   Diagnosis Date    Diabetes ([facility] [address]     Hypercholesterolemia     Hypertension         History reviewed. No pertinent surgical history.     Family History   Problem Relation Age of Onset    Diabetes Mother     Diabetes Father     Diabetes Maternal Aunt     Diabetes Maternal Grandmother     Diabetes Maternal Grandfather     Diabetes Paternal Grandmother     Diabetes Paternal Grandfather         Social History     Socioeconomic History    Marital status:      Spouse name: Not on file    Number of children: Not on file    Years of education: Not on file    Highest education level: Not on file   Occupational History    Not on file   Tobacco Use    Smoking status: Never    Smokeless tobacco: Never   Vaping Use    Vaping Use: Never used   Substance and Sexual Activity    Alcohol use: No    Drug use: No    Sexual activity: Yes     Partners: Male   Other Topics Concern    Not on file   Social History Narrative    Not on file     Social Determinants of Health     Financial Resource Strain: Not on file   Food Insecurity: Not on file Transportation Needs: Not on file   Physical Activity: Not on file   Stress: Not on file   Social Connections: Not on file   Intimate Partner Violence: Not on file   Housing Stability: Not on file       Review of Systems   All other systems reviewed and are negative. Vitals:  Ht 5' 3\" (1.6 m)   Wt 165 lb (74.8 kg)   LMP 06/30/2013   BMI 29.23 kg/m²    Body mass index is 29.23 kg/m². Ortho Exam     The patient is well-developed and well-nourished. The patient presents today in alert and oriented x3 with a normal mood and affect. The patient stands with a normal weightbearing line and walks with a normal gait. Left shoulder: No shoulder girdle atrophy. There is no soft tissue swelling, ecchymosis, abrasions, or lacerations. Active range of motion of the shoulder is limited to 130 degrees of forward flexion, 120 degrees of lateral abduction, and 70 degrees of external rotation. Internal rotation is to the SI joint and is painful. Passive range of motion is full with a painful impingement sign and painful Cha sign. Rotator cuff strength is painful to evaluate and is a 4+/5 with forward flexion and lateral abduction. External rotation strength is maintained. There is no crepitation about the joint. Palpation of the [facility] joint does reproduce discomfort and there is pain elicited with cross body abduction. Strength of the extremity is 5/5 strength at biceps/triceps/wrist extension and is comparable to the contralateral side. DTRs are intact at +2/4 and are symmetrical.  Cervical range of motion is full with no pain to palpation along the paraspinal musculature medial border of the scapula. Spurling's sign is negative. ASSESSMENT/PLAN:      1. Chronic left shoulder pain  2. Pain of left shoulder region  3. Arthritis of left acromioclavicular joint  4. Shoulder impingement, left  5.  Rotator cuff tendinitis, left  -     REFERRAL TO PHYSICAL THERAPY  6. Adhesive capsulitis of left shoulder  - REFERRAL TO PHYSICAL THERAPY  7. Labral tear of shoulder, left, subsequent encounter     Below is the assessment and plan developed based on review of pertinent history, physical exam, labs, studies, and medications. **The patient was referred to formal physical therapy. **    We discussed the patient's left shoulder pain and we independently reviewed her MRI images and results and they were consistent with adhesive capsulitis and the proper clinical setting. Mild rotator cuff tendinosis and bursal surface fraying. No full-thickness tear is demonstrated. Nondisplaced superior labral tear. Acromioclavicular osteophytes may cause subacromial impingement. The possible treatment options were discussed with the patient and we elected to treat her pain with rest, ice, activity modification, and a Medrol Dosepak. The patient was given a prescription for Medrol Dosepak which she will use as directed. While taking her Medrol Dosepak, the patient will discontinue use of anti-inflammatory medication. Once her Medrol Dosepak is complete, the patient will resume anti-inflammatories at that time. The patient was referred to formal physical therapy to work on range of motion, strengthening, and stretching exercises. She will also continue to work on these exercises with an at-home exercise program as pain tolerates. I will see her back in 4 weeks for reevaluation if she continues to have persistence of her pain however, if her pain has improved and is well-maintained I will see her back on an as-needed basis at which point we will discuss alternative treatment options. Return in about 4 weeks (around 3/27/2023), or if symptoms worsen or fail to improve. An electronic signature was used to authenticate this note.   -- Mansoor Cintron MD

## 2023-05-02 ENCOUNTER — HOSPITAL ENCOUNTER (OUTPATIENT)
Dept: PHYSICAL THERAPY | Age: 63
Discharge: HOME | End: 2023-05-02
Payer: COMMERCIAL

## 2023-05-02 PROCEDURE — 97161 PT EVAL LOW COMPLEX 20 MIN: CPT | Performed by: PHYSICAL THERAPY ASSISTANT

## 2023-05-02 PROCEDURE — 97110 THERAPEUTIC EXERCISES: CPT | Performed by: PHYSICAL THERAPY ASSISTANT

## 2023-05-08 ENCOUNTER — HOSPITAL ENCOUNTER (OUTPATIENT)
Facility: HOSPITAL | Age: 63
Setting detail: RECURRING SERIES
Discharge: HOME | End: 2023-05-11
Payer: COMMERCIAL

## 2023-05-08 PROCEDURE — 97110 THERAPEUTIC EXERCISES: CPT

## 2023-05-08 PROCEDURE — 97140 MANUAL THERAPY 1/> REGIONS: CPT

## 2023-05-08 NOTE — PROGRESS NOTES
w/ HEP  Pt will apply ice to shoulder at least 1 x day  Pt will sit w/ proper posture for over 20 minutes    Long Term Goals:  To be accomplished at 12 weeks:  Pt will demonstrate over 140 deg of L shoulder abd AROM  Pt will report over 10 point improvement on FOTO  Pt will be able to sleep on L shoulder for over 30 minutes    PLAN  Yes  Continue plan of care  Re-Cert Due: 7/15/8803  []  Upgrade activities as tolerated  []  Discharge due to :  []  Other:      Gavin Arias, PT       5/8/2023       3:03 PM

## 2023-05-10 DIAGNOSIS — I10 ESSENTIAL (PRIMARY) HYPERTENSION: ICD-10-CM

## 2023-05-10 RX ORDER — IRBESARTAN AND HYDROCHLOROTHIAZIDE 150; 12.5 MG/1; MG/1
TABLET, FILM COATED ORAL
Qty: 90 TABLET | Refills: 0 | Status: SHIPPED | OUTPATIENT
Start: 2023-05-10

## 2023-05-11 ENCOUNTER — APPOINTMENT (OUTPATIENT)
Facility: HOSPITAL | Age: 63
End: 2023-05-11
Payer: COMMERCIAL

## 2023-05-16 ENCOUNTER — OFFICE VISIT (OUTPATIENT)
Age: 63
End: 2023-05-16
Payer: COMMERCIAL

## 2023-05-16 VITALS
HEIGHT: 63 IN | SYSTOLIC BLOOD PRESSURE: 136 MMHG | TEMPERATURE: 97.3 F | DIASTOLIC BLOOD PRESSURE: 80 MMHG | WEIGHT: 170 LBS | BODY MASS INDEX: 30.12 KG/M2 | HEART RATE: 72 BPM | RESPIRATION RATE: 16 BRPM | OXYGEN SATURATION: 99 %

## 2023-05-16 DIAGNOSIS — Z11.59 SCREENING FOR VIRAL DISEASE: ICD-10-CM

## 2023-05-16 DIAGNOSIS — E78.5 HYPERLIPIDEMIA WITH TARGET LDL LESS THAN 100: ICD-10-CM

## 2023-05-16 DIAGNOSIS — I10 PRIMARY HYPERTENSION: ICD-10-CM

## 2023-05-16 DIAGNOSIS — E11.9 TYPE 2 DIABETES MELLITUS WITHOUT COMPLICATION, WITHOUT LONG-TERM CURRENT USE OF INSULIN (HCC): Primary | ICD-10-CM

## 2023-05-16 LAB — HBA1C MFR BLD: 6.2 %

## 2023-05-16 PROCEDURE — G8427 DOCREV CUR MEDS BY ELIG CLIN: HCPCS | Performed by: FAMILY MEDICINE

## 2023-05-16 PROCEDURE — 3074F SYST BP LT 130 MM HG: CPT | Performed by: FAMILY MEDICINE

## 2023-05-16 PROCEDURE — 83036 HEMOGLOBIN GLYCOSYLATED A1C: CPT | Performed by: FAMILY MEDICINE

## 2023-05-16 PROCEDURE — 3078F DIAST BP <80 MM HG: CPT | Performed by: FAMILY MEDICINE

## 2023-05-16 PROCEDURE — 3017F COLORECTAL CA SCREEN DOC REV: CPT | Performed by: FAMILY MEDICINE

## 2023-05-16 PROCEDURE — 99214 OFFICE O/P EST MOD 30 MIN: CPT | Performed by: FAMILY MEDICINE

## 2023-05-16 PROCEDURE — G8417 CALC BMI ABV UP PARAM F/U: HCPCS | Performed by: FAMILY MEDICINE

## 2023-05-16 PROCEDURE — 3046F HEMOGLOBIN A1C LEVEL >9.0%: CPT | Performed by: FAMILY MEDICINE

## 2023-05-16 PROCEDURE — 1036F TOBACCO NON-USER: CPT | Performed by: FAMILY MEDICINE

## 2023-05-16 PROCEDURE — 2022F DILAT RTA XM EVC RTNOPTHY: CPT | Performed by: FAMILY MEDICINE

## 2023-05-16 RX ORDER — ATORVASTATIN CALCIUM 10 MG/1
TABLET, FILM COATED ORAL
COMMUNITY

## 2023-05-16 SDOH — ECONOMIC STABILITY: INCOME INSECURITY: HOW HARD IS IT FOR YOU TO PAY FOR THE VERY BASICS LIKE FOOD, HOUSING, MEDICAL CARE, AND HEATING?: NOT HARD AT ALL

## 2023-05-16 SDOH — ECONOMIC STABILITY: FOOD INSECURITY: WITHIN THE PAST 12 MONTHS, THE FOOD YOU BOUGHT JUST DIDN'T LAST AND YOU DIDN'T HAVE MONEY TO GET MORE.: NEVER TRUE

## 2023-05-16 SDOH — ECONOMIC STABILITY: HOUSING INSECURITY
IN THE LAST 12 MONTHS, WAS THERE A TIME WHEN YOU DID NOT HAVE A STEADY PLACE TO SLEEP OR SLEPT IN A SHELTER (INCLUDING NOW)?: NO

## 2023-05-16 SDOH — ECONOMIC STABILITY: FOOD INSECURITY: WITHIN THE PAST 12 MONTHS, YOU WORRIED THAT YOUR FOOD WOULD RUN OUT BEFORE YOU GOT MONEY TO BUY MORE.: NEVER TRUE

## 2023-05-16 ASSESSMENT — PATIENT HEALTH QUESTIONNAIRE - PHQ9
SUM OF ALL RESPONSES TO PHQ QUESTIONS 1-9: 0
SUM OF ALL RESPONSES TO PHQ QUESTIONS 1-9: 0
1. LITTLE INTEREST OR PLEASURE IN DOING THINGS: 0
SUM OF ALL RESPONSES TO PHQ9 QUESTIONS 1 & 2: 0
SUM OF ALL RESPONSES TO PHQ QUESTIONS 1-9: 0
SUM OF ALL RESPONSES TO PHQ QUESTIONS 1-9: 0
2. FEELING DOWN, DEPRESSED OR HOPELESS: 0

## 2023-05-16 NOTE — PROGRESS NOTES
HPI  Jr [place] 58 y.o. female  presents to the office today patient comes today for follow-up on her chronic conditions of diabetes hypertension hyperlipidemia. Has been under a lot of stress at work which has been affecting her and her health. But she states that she is going to focus more on her care and try to balance her her work life ratio. Blood pressure 136/80, pulse 72, temperature 97.3 °F (36.3 °C), temperature source Temporal, resp. rate 16, height 5' 3\" (1.6 m), weight 170 lb (77.1 kg), SpO2 99 %. Body mass index is 30.11 kg/m².    Chief Complaint   Patient presents with    Diabetes        DM2: A1c per POC today   Results for orders placed or performed in visit on 05/16/23   Comprehensive Metabolic Panel   Result Value Ref Range    Glucose 94 70 - 99 mg/dL    BUN 20 8 - 27 mg/dL    Creatinine 0.95 0.57 - 1.00 mg/dL    Est, Glomerular Filtration Rate 68 >59 mL/min/1.73    BUN/Creatinine Ratio 21 12 - 28    Sodium 144 134 - 144 mmol/L    Potassium 5.2 3.5 - 5.2 mmol/L    Chloride 106 96 - 106 mmol/L    CO2 24 20 - 29 mmol/L    Calcium 10.2 8.7 - 10.3 mg/dL    Total Protein 7.5 6.0 - 8.5 g/dL    Albumin 4.9 (H) 3.8 - 4.8 g/dL    Globulin, Total 2.6 1.5 - 4.5 g/dL    Albumin/Globulin Ratio 1.9 1.2 - 2.2    Total Bilirubin 0.3 0.0 - 1.2 mg/dL    Alkaline Phosphatase 77 44 - 121 IU/L    AST 14 0 - 40 IU/L    ALT 15 0 - 32 IU/L   Lipid Panel   Result Value Ref Range    Cholesterol 189 100 - 199 mg/dL    Triglycerides 63 0 - 149 mg/dL    HDL 79 >39 mg/dL    VLDL Cholesterol Calculated 12 5 - 40 mg/dL    LDL Calculated 98 0 - 99 mg/dL   HIV 1/2 Ag/Ab, 4TH Generation,W Rflx Confirm   Result Value Ref Range    HIV Screen 4th Generation wRfx Non Reactive Non Reactive   Cardiovascular Report   Result Value Ref Range    Interpretation Note    AMB POC HEMOGLOBIN A1C   Result Value Ref Range    Hemoglobin A1C, POC 6.2 %     , We discussed about focusing on diet and exercise as her A1c is gone up patient will HOLD coumadin

## 2023-05-16 NOTE — PROGRESS NOTES
Chief Complaint   Patient presents with    Diabetes     1. \"Have you been to the ER, urgent care clinic since your last visit? Hospitalized since your last visit? \" no    2. \"Have you seen or consulted any other health care providers outside of the [address] [place] [address] since your last visit? \"  PT at [address] Left shoulder       Eye exam yes - DR. Alex     A1c

## 2023-05-18 LAB
ALBUMIN SERPL-MCNC: 4.9 G/DL (ref 3.8–4.8)
ALBUMIN/GLOB SERPL: 1.9 {RATIO} (ref 1.2–2.2)
ALP SERPL-CCNC: 77 IU/L (ref 44–121)
ALT SERPL-CCNC: 15 IU/L (ref 0–32)
AST SERPL-CCNC: 14 IU/L (ref 0–40)
BILIRUB SERPL-MCNC: 0.3 MG/DL (ref 0–1.2)
BUN SERPL-MCNC: 20 MG/DL (ref 8–27)
BUN/CREAT SERPL: 21 (ref 12–28)
CALCIUM SERPL-MCNC: 10.2 MG/DL (ref 8.7–10.3)
CHLORIDE SERPL-SCNC: 106 MMOL/L (ref 96–106)
CHOLEST SERPL-MCNC: 189 MG/DL (ref 100–199)
CO2 SERPL-SCNC: 24 MMOL/L (ref 20–29)
CREAT SERPL-MCNC: 0.95 MG/DL (ref 0.57–1)
EGFRCR SERPLBLD CKD-EPI 2021: 68 ML/MIN/1.73
GLOBULIN SER CALC-MCNC: 2.6 G/DL (ref 1.5–4.5)
GLUCOSE SERPL-MCNC: 94 MG/DL (ref 70–99)
HDLC SERPL-MCNC: 79 MG/DL
HIV 1+2 AB+HIV1 P24 AG SERPL QL IA: NON REACTIVE
IMP & REVIEW OF LAB RESULTS: NORMAL
LDLC SERPL CALC-MCNC: 98 MG/DL (ref 0–99)
POTASSIUM SERPL-SCNC: 5.2 MMOL/L (ref 3.5–5.2)
PROT SERPL-MCNC: 7.5 G/DL (ref 6–8.5)
SODIUM SERPL-SCNC: 144 MMOL/L (ref 134–144)
TRIGL SERPL-MCNC: 63 MG/DL (ref 0–149)
VLDLC SERPL CALC-MCNC: 12 MG/DL (ref 5–40)

## 2023-05-21 DIAGNOSIS — E11.9 TYPE 2 DIABETES MELLITUS WITHOUT COMPLICATIONS (HCC): ICD-10-CM

## 2023-05-22 RX ORDER — LIRAGLUTIDE 6 MG/ML
INJECTION SUBCUTANEOUS
Qty: 9 ML | Refills: 2 | Status: SHIPPED | OUTPATIENT
Start: 2023-05-22

## 2023-07-02 ENCOUNTER — HOSPITAL ENCOUNTER (EMERGENCY)
Facility: HOSPITAL | Age: 63
Discharge: HOME | End: 2023-07-02
Attending: EMERGENCY MEDICINE
Payer: COMMERCIAL

## 2023-07-02 VITALS
DIASTOLIC BLOOD PRESSURE: 71 MMHG | OXYGEN SATURATION: 96 % | HEART RATE: 74 BPM | RESPIRATION RATE: 16 BRPM | SYSTOLIC BLOOD PRESSURE: 115 MMHG

## 2023-07-02 DIAGNOSIS — T63.461A YELLOW JACKET STING, ACCIDENTAL OR UNINTENTIONAL, INITIAL ENCOUNTER: ICD-10-CM

## 2023-07-02 DIAGNOSIS — T78.40XA ALLERGIC REACTION, INITIAL ENCOUNTER: Primary | ICD-10-CM

## 2023-07-02 PROCEDURE — 2500000003 HC RX 250 WO HCPCS: Performed by: EMERGENCY MEDICINE

## 2023-07-02 PROCEDURE — 99284 EMERGENCY DEPT VISIT MOD MDM: CPT

## 2023-07-02 PROCEDURE — 96372 THER/PROPH/DIAG INJ SC/IM: CPT

## 2023-07-02 PROCEDURE — 6360000002 HC RX W HCPCS

## 2023-07-02 PROCEDURE — 96361 HYDRATE IV INFUSION ADD-ON: CPT

## 2023-07-02 PROCEDURE — 96374 THER/PROPH/DIAG INJ IV PUSH: CPT

## 2023-07-02 PROCEDURE — 6360000002 HC RX W HCPCS: Performed by: EMERGENCY MEDICINE

## 2023-07-02 PROCEDURE — 96375 TX/PRO/DX INJ NEW DRUG ADDON: CPT

## 2023-07-02 PROCEDURE — 2580000003 HC RX 258: Performed by: EMERGENCY MEDICINE

## 2023-07-02 RX ORDER — DIPHENHYDRAMINE HYDROCHLORIDE 50 MG/ML
25 INJECTION INTRAMUSCULAR; INTRAVENOUS
Status: COMPLETED | OUTPATIENT
Start: 2023-07-02 | End: 2023-07-02

## 2023-07-02 RX ORDER — EPINEPHRINE 1 MG/ML
INJECTION, SOLUTION, CONCENTRATE INTRAVENOUS
Status: COMPLETED
Start: 2023-07-02 | End: 2023-07-02

## 2023-07-02 RX ORDER — EPINEPHRINE 1 MG/ML
0.3 INJECTION, SOLUTION, CONCENTRATE INTRAVENOUS ONCE
Status: COMPLETED | OUTPATIENT
Start: 2023-07-02 | End: 2023-07-02

## 2023-07-02 RX ORDER — PREDNISONE 50 MG/1
50 TABLET ORAL DAILY
Qty: 3 TABLET | Refills: 0 | Status: SHIPPED | OUTPATIENT
Start: 2023-07-02 | End: 2023-07-05

## 2023-07-02 RX ORDER — EPINEPHRINE 0.3 MG/.3ML
0.3 INJECTION SUBCUTANEOUS ONCE
Qty: 2 EACH | Refills: 0 | Status: SHIPPED | OUTPATIENT
Start: 2023-07-02 | End: 2023-07-02

## 2023-07-02 RX ORDER — 0.9 % SODIUM CHLORIDE 0.9 %
1000 INTRAVENOUS SOLUTION INTRAVENOUS ONCE
Status: COMPLETED | OUTPATIENT
Start: 2023-07-02 | End: 2023-07-02

## 2023-07-02 RX ADMIN — EPINEPHRINE 0.3 MG: 1 INJECTION, SOLUTION, CONCENTRATE INTRAVENOUS at 20:55

## 2023-07-02 RX ADMIN — DIPHENHYDRAMINE HYDROCHLORIDE 25 MG: 50 INJECTION INTRAMUSCULAR; INTRAVENOUS at 21:01

## 2023-07-02 RX ADMIN — METHYLPREDNISOLONE SODIUM SUCCINATE 125 MG: 125 INJECTION, POWDER, FOR SOLUTION INTRAMUSCULAR; INTRAVENOUS at 20:59

## 2023-07-02 RX ADMIN — SODIUM CHLORIDE, PRESERVATIVE FREE 20 MG: 5 INJECTION INTRAVENOUS at 21:00

## 2023-07-02 RX ADMIN — SODIUM CHLORIDE 1000 ML: 9 INJECTION, SOLUTION INTRAVENOUS at 20:57

## 2023-07-03 NOTE — ED PROVIDER NOTES
[facility]  EMERGENCY DEPARTMENT ENCOUNTER      Pt Name: Harsha Carrion  MRN: [id number]  [facility] 1960  Date of evaluation: 7/2/2023  Provider: Familia Hernandez MD    CHIEF COMPLAINT       Chief Complaint   Patient presents with    Allergic Reaction         HISTORY OF PRESENT ILLNESS   (Location/Symptom, Timing/Onset, Context/Setting, Quality, Duration, Modifying Factors, Severity)  Note limiting factors. 59-year-old with a history of hypertension, diabetes, hyperlipidemia. She presents accompanied by her male friend after being stung by multiple yellow jackets approximately 30 minutes prior to arrival.  She was stung in her groin area and right arm. She has a throat tightening sensation with some difficulty swallowing. She feels short of breath. She denies nausea, vomiting, dizziness. She took an outdated Benadryl prior to arrival.  She denies a history of anaphylaxis. Review of External Medical Records:     Nursing Notes were reviewed. REVIEW OF SYSTEMS    (2-9 systems for level 4, 10 or more for level 5)     Review of Systems    Except as noted above the remainder of the review of systems was reviewed and negative. PAST MEDICAL HISTORY     Past Medical History:   Diagnosis Date    Diabetes ([address])     Hypercholesterolemia     Hypertension          SURGICAL HISTORY     No past surgical history on file.       CURRENT MEDICATIONS       Previous Medications    ATORVASTATIN (LIPITOR) 10 MG TABLET    atorvastatin    DICLOFENAC (VOLTAREN) 75 MG EC TABLET    Take by mouth 2 times daily    FLUTICASONE (FLONASE) 50 MCG/ACT NASAL SPRAY    INSTILL 2 SPRAYS EVEYR DAY UNTIL DIRECTED TO STOP    IRBESARTAN-HYDROCHLOROTHIAZIDE (AVALIDE) 150-12.5 MG PER TABLET    TAKE 1 TABLET BY MOUTH EVERY DAY    METFORMIN (GLUCOPHAGE-XR) 500 MG EXTENDED RELEASE TABLET    TAKE 1 TABLET BY MOUTH EVERY DAY WITH DINNER    VICTOZA 18 MG/3ML SOPN SC INJECTION    INJECT 0.6 MG SUBCUTANEOUSLY EVERY DAY THEN INCREASE

## 2023-07-03 NOTE — ED TRIAGE NOTES
Pt stung by mult bees. Voice change, tongue and throat swelling with hives.  MD to bedside immediately, verbal for IM EPI

## 2023-07-03 NOTE — ED NOTES
Pain assessment on discharge was   Condition Stable  Patient discharged to home  Patient education was completed  Education taught to patient   Teaching method used was handout and verbal  Understanding of teaching was good  Patient was discharged ambulatory  Discharged with family  Valuables were given to patient remained In possession of belongings during stay.       Mickie Wang RN  07/02/23 [id number]

## 2023-07-24 RX ORDER — ATORVASTATIN CALCIUM 20 MG/1
TABLET, FILM COATED ORAL
Qty: 90 TABLET | Refills: 0 | Status: SHIPPED | OUTPATIENT
Start: 2023-07-24

## 2023-07-24 NOTE — TELEPHONE ENCOUNTER
PCP: Isauro Schmidt MD    Last appt: [unfilled]  Future Appointments   Date Time Provider [address]   11/21/2023  9:30 AM Catherine Mock MD PA[facility] BS AMB       Requested Prescriptions     Pending Prescriptions Disp Refills    atorvastatin (LIPITOR) 20 MG tablet [Pharmacy Med Name: ATORVASTATIN CALCIUM 20 MG ORAL TABLET] 90 tablet 0     Sig: TAKE ONE TABLET BY MOUTH DAILY AT BEDTIME

## 2023-07-27 RX ORDER — METFORMIN HYDROCHLORIDE 500 MG/1
500 TABLET, EXTENDED RELEASE ORAL
Qty: 90 TABLET | Refills: 0 | Status: SHIPPED | OUTPATIENT
Start: 2023-07-27

## 2023-07-27 RX ORDER — DICLOFENAC SODIUM 75 MG/1
75 TABLET, DELAYED RELEASE ORAL 2 TIMES DAILY PRN
Qty: 60 TABLET | Refills: 0 | Status: SHIPPED | OUTPATIENT
Start: 2023-07-27 | End: 2023-08-17

## 2023-07-27 NOTE — TELEPHONE ENCOUNTER
Jaziel calling for prescriptions stating they did not receive. Metformin had been sent to [facility]. Thanks, Adriana Dears    Last appointment: 5/16/23 MD Devora Harris  Next appointment: 11/21/23 To[place]ito  Previous refill encounter(s):   Diclofenac 10/11/22 60  Metformin 4/8/23 90 + 1 (Laron Carbone [address] [facility] pharmacy)    Requested Prescriptions     Pending Prescriptions Disp Refills    diclofenac (VOLTAREN) 75 MG EC tablet 60 tablet 0     Sig: Take 1 tablet by mouth 2 times daily    metFORMIN (GLUCOPHAGE-XR) 500 MG extended release tablet 90 tablet 1     Sig: Take 1 tablet by mouth Daily with supper     For Pharmacy Admin Tracking Only    Program: Medication Refill  CPA in place:    Recommendation Provided To:    Intervention Detail: New Rx: 2, reason: Patient Preference  Intervention Accepted By:   Drew Garza Closed?:    Time Spent (min): 10

## 2023-07-27 NOTE — TELEPHONE ENCOUNTER
Pharmacy called some how did not transfer over to their system would like for it to be recent.  Mary refill        metFORMIN (GLUCOPHAGE-XR) 500 MG extended release tablet    diclofenac (VOLTAREN) 75 MG EC tablet    Best call back #[phone]

## 2023-08-13 DIAGNOSIS — I10 ESSENTIAL (PRIMARY) HYPERTENSION: ICD-10-CM

## 2023-08-14 ENCOUNTER — TELEPHONE (OUTPATIENT)
Age: 63
End: 2023-08-14

## 2023-08-14 NOTE — TELEPHONE ENCOUNTER
Patient called back stating that she has received that medication from [facility], and is no longer needing Dr. Duy De Oliveira to send it in.

## 2023-08-14 NOTE — TELEPHONE ENCOUNTER
Patient called stating that she tested positive for covid this morning, and is hoping to have medication sent into the pharmacy. Patient stated that she has been having symptoms for 24 hours. Patient is hoping to have this medication sent to the [facility] on [address], as well as a call back when this has been completed.     Best call back number  [phone]

## 2023-08-16 RX ORDER — IRBESARTAN AND HYDROCHLOROTHIAZIDE 150; 12.5 MG/1; MG/1
TABLET, FILM COATED ORAL
Qty: 90 TABLET | Refills: 1 | Status: SHIPPED | OUTPATIENT
Start: 2023-08-16

## 2023-08-17 RX ORDER — DICLOFENAC SODIUM 75 MG/1
75 TABLET, DELAYED RELEASE ORAL 2 TIMES DAILY PRN
Qty: 60 TABLET | Refills: 0 | Status: SHIPPED | OUTPATIENT
Start: 2023-08-17

## 2023-08-17 NOTE — TELEPHONE ENCOUNTER
PCP: Corinne Flor, MD    Last appt: 5/16/2023       Future Appointments   Date Time Provider [address]   11/21/2023  9:30 AM Fatuma Hernandez MD PA[facility] BS AMB       Requested Prescriptions     Pending Prescriptions Disp Refills    diclofenac (VOLTAREN) 75 MG EC tablet [Pharmacy Med Name: DICLOFENAC SODIUM 75 MG ORAL TABLET DELAYED RELEASE] 60 tablet 0     Sig: TAKE 1 TABLET BY MOUTH 2 TIMES DAILY AS NEEDED FOR PAIN       Prior labs and Blood pressures:  BP Readings from Last 3 Encounters:   07/02/23 115/71   05/16/23 136/80   10/11/22 122/60     Lab Results   Component Value Date/Time     05/16/2023 04:14 PM    K 5.2 05/16/2023 04:14 PM     05/16/2023 04:14 PM    CO2 24 05/16/2023 04:14 PM    BUN 20 05/16/2023 04:14 PM    GFRAA >60 05/02/2022 12:12 PM     Lab Results   Component Value Date/Time    [id number] 6.2 05/16/2023 04:01 PM     Lab Results   Component Value Date/Time    CHOL 189 05/16/2023 04:14 PM    CHOL 199 10/11/2022 04:35 PM    HDL 79 05/16/2023 04:14 PM    VLDL 12 09/10/2020 12:00 AM     No results found for: VITD3, VD3RIA    Lab Results   Component Value Date/Time    TSH 0.43 05/17/2021 03:08 PM

## 2023-10-09 NOTE — TELEPHONE ENCOUNTER
PCP: Zeynep Hyman MD    Last appt: 5/16/2023       Future Appointments   Date Time Provider [address]   11/21/2023  9:30 AM Tari Sibley MD [facility] BS AMB       Requested Prescriptions     Pending Prescriptions Disp Refills    atorvastatin (LIPITOR) 20 MG tablet [Pharmacy Med Name: ATORVASTATIN CALCIUM 20 MG ORAL TABLET] 90 tablet 0     Sig: TAKE ONE TABLET BY MOUTH DAILY AT BEDTIME       Prior labs and Blood pressures:  BP Readings from Last 3 Encounters:   07/02/23 115/71   05/16/23 136/80   10/11/22 122/60     Lab Results   Component Value Date/Time     05/16/2023 04:14 PM    K 5.2 05/16/2023 04:14 PM     05/16/2023 04:14 PM    CO2 24 05/16/2023 04:14 PM    BUN 20 05/16/2023 04:14 PM    GFRAA >60 05/02/2022 12:12 PM     Lab Results   Component Value Date/Time    [id number] 6.2 05/16/2023 04:01 PM     Lab Results   Component Value Date/Time    CHOL 189 05/16/2023 04:14 PM    CHOL 199 10/11/2022 04:35 PM    HDL 79 05/16/2023 04:14 PM    VLDL 12 09/10/2020 12:00 AM     No results found for: \"VITD3\", \"VD3RIA\"    Lab Results   Component Value Date/Time    TSH 0.43 05/17/2021 03:08 PM

## 2023-10-10 RX ORDER — ATORVASTATIN CALCIUM 20 MG/1
TABLET, FILM COATED ORAL
Qty: 90 TABLET | Refills: 0 | Status: SHIPPED | OUTPATIENT
Start: 2023-10-10

## 2023-10-24 NOTE — TELEPHONE ENCOUNTER
PCP: Nicholas Lofton MD    Last appt: 5/16/2023       Future Appointments   Date Time Provider [address]   11/21/2023  9:30 AM Nichole Sibley Aas, MD PA[facility] BS AMB       Requested Prescriptions     Pending Prescriptions Disp Refills    metFORMIN (GLUCOPHAGE-XR) 500 MG extended release tablet [Pharmacy Med Name: METFORMIN HCL  MG ORAL TABLET EXTENDED RELEASE 24 HOUR] 90 tablet 0     Sig: TAKE 1 TABLET BY MOUTH DAILY WITH SUPPER       Prior labs and Blood pressures:  BP Readings from Last 3 Encounters:   07/02/23 115/71   05/16/23 136/80   10/11/22 122/60     Lab Results   Component Value Date/Time     05/16/2023 04:14 PM    K 5.2 05/16/2023 04:14 PM     05/16/2023 04:14 PM    CO2 24 05/16/2023 04:14 PM    BUN 20 05/16/2023 04:14 PM    GFRAA >60 05/02/2022 12:12 PM     Lab Results   Component Value Date/Time    [id number] 6.2 05/16/2023 04:01 PM     Lab Results   Component Value Date/Time    CHOL 189 05/16/2023 04:14 PM    CHOL 199 10/11/2022 04:35 PM    HDL 79 05/16/2023 04:14 PM    VLDL 12 09/10/2020 12:00 AM     No results found for: \"VITD3\", \"VD3RIA\"    Lab Results   Component Value Date/Time    TSH 0.43 05/17/2021 03:08 PM

## 2023-10-26 RX ORDER — METFORMIN HYDROCHLORIDE 500 MG/1
500 TABLET, EXTENDED RELEASE ORAL
Qty: 90 TABLET | Refills: 0 | Status: SHIPPED | OUTPATIENT
Start: 2023-10-26

## 2023-11-08 NOTE — PROGRESS NOTES
Chief Complaint   Patient presents with    Ear Pain     1. Have you been to the ER, urgent care clinic since your last visit? Hospitalized since your last visit? Patient was seen at [facility] on 1/31 for URI. 2. Have you seen or consulted any other health care providers outside of the [facility] since your last visit? Include any pap smears or colon screening.  No INR at goal. Medications and chart reviewed. No changes noted to necessitate adjustment of warfarin or follow-up plan. See calendar.

## 2023-11-21 ENCOUNTER — OFFICE VISIT (OUTPATIENT)
Age: 63
End: 2023-11-21

## 2023-11-21 VITALS
TEMPERATURE: 97.5 F | OXYGEN SATURATION: 100 % | HEART RATE: 75 BPM | SYSTOLIC BLOOD PRESSURE: 138 MMHG | HEIGHT: 63 IN | WEIGHT: 170 LBS | RESPIRATION RATE: 16 BRPM | DIASTOLIC BLOOD PRESSURE: 60 MMHG | BODY MASS INDEX: 30.12 KG/M2

## 2023-11-21 DIAGNOSIS — H61.22 IMPACTED CERUMEN OF LEFT EAR: ICD-10-CM

## 2023-11-21 DIAGNOSIS — E66.01 OBESITY, MORBID (HCC): ICD-10-CM

## 2023-11-21 DIAGNOSIS — I10 ESSENTIAL (PRIMARY) HYPERTENSION: ICD-10-CM

## 2023-11-21 DIAGNOSIS — Z23 FLU VACCINE NEED: ICD-10-CM

## 2023-11-21 DIAGNOSIS — E11.9 TYPE 2 DIABETES MELLITUS WITHOUT COMPLICATION, WITHOUT LONG-TERM CURRENT USE OF INSULIN (HCC): Primary | ICD-10-CM

## 2023-11-21 DIAGNOSIS — Z01.84 IMMUNITY STATUS TESTING: ICD-10-CM

## 2023-11-21 DIAGNOSIS — Z23 NEED FOR VACCINATION WITH 20-POLYVALENT PNEUMOCOCCAL CONJUGATE VACCINE: ICD-10-CM

## 2023-11-21 DIAGNOSIS — H60.543 ECZEMA OF EXTERNAL EAR, BILATERAL: ICD-10-CM

## 2023-11-21 DIAGNOSIS — E78.5 HYPERLIPIDEMIA WITH TARGET LDL LESS THAN 100: ICD-10-CM

## 2023-11-21 LAB
ALBUMIN SERPL-MCNC: 4.1 G/DL (ref 3.5–5)
ALBUMIN/GLOB SERPL: 1.2 (ref 1.1–2.2)
ALP SERPL-CCNC: 71 U/L (ref 45–117)
ALT SERPL-CCNC: 21 U/L (ref 12–78)
ANION GAP SERPL CALC-SCNC: 3 MMOL/L (ref 5–15)
AST SERPL-CCNC: 15 U/L (ref 15–37)
BILIRUB SERPL-MCNC: 0.4 MG/DL (ref 0.2–1)
BUN SERPL-MCNC: 18 MG/DL (ref 6–20)
BUN/CREAT SERPL: 17 (ref 12–20)
CALCIUM SERPL-MCNC: 9.8 MG/DL (ref 8.5–10.1)
CHLORIDE SERPL-SCNC: 107 MMOL/L (ref 97–108)
CHOLEST SERPL-MCNC: 180 MG/DL
CO2 SERPL-SCNC: 29 MMOL/L (ref 21–32)
CREAT SERPL-MCNC: 1.07 MG/DL (ref 0.55–1.02)
CREAT UR-MCNC: 105 MG/DL
ERYTHROCYTE [DISTWIDTH] IN BLOOD BY AUTOMATED COUNT: 13.6 % (ref 11.5–14.5)
GLOBULIN SER CALC-MCNC: 3.5 G/DL (ref 2–4)
GLUCOSE SERPL-MCNC: 140 MG/DL (ref 65–100)
HBA1C MFR BLD: 6 %
HBV SURFACE AB SER QL: NONREACTIVE
HBV SURFACE AB SER-ACNC: <3.1 MIU/ML
HCT VFR BLD AUTO: 38.6 % (ref 35–47)
HDLC SERPL-MCNC: 83 MG/DL
HDLC SERPL: 2.2 (ref 0–5)
HGB BLD-MCNC: 12.7 G/DL (ref 11.5–16)
LDLC SERPL CALC-MCNC: 89 MG/DL (ref 0–100)
MCH RBC QN AUTO: 29.7 PG (ref 26–34)
MCHC RBC AUTO-ENTMCNC: 32.9 G/DL (ref 30–36.5)
MCV RBC AUTO: 90.2 FL (ref 80–99)
MICROALBUMIN UR-MCNC: 0.81 MG/DL
MICROALBUMIN/CREAT UR-RTO: 8 MG/G (ref 0–30)
NRBC # BLD: 0 K/UL (ref 0–0.01)
NRBC BLD-RTO: 0 PER 100 WBC
PLATELET # BLD AUTO: 243 K/UL (ref 150–400)
PMV BLD AUTO: 10.3 FL (ref 8.9–12.9)
POTASSIUM SERPL-SCNC: 4.5 MMOL/L (ref 3.5–5.1)
PROT SERPL-MCNC: 7.6 G/DL (ref 6.4–8.2)
RBC # BLD AUTO: 4.28 M/UL (ref 3.8–5.2)
SODIUM SERPL-SCNC: 139 MMOL/L (ref 136–145)
TRIGL SERPL-MCNC: 40 MG/DL
VLDLC SERPL CALC-MCNC: 8 MG/DL
WBC # BLD AUTO: 5.3 K/UL (ref 3.6–11)

## 2023-11-21 RX ORDER — FLUOCINOLONE ACETONIDE 0.11 MG/ML
1 OIL AURICULAR (OTIC) 2 TIMES DAILY
Qty: 20 ML | Refills: 0 | Status: SHIPPED | OUTPATIENT
Start: 2023-11-21

## 2023-11-21 SDOH — ECONOMIC STABILITY: FOOD INSECURITY: WITHIN THE PAST 12 MONTHS, THE FOOD YOU BOUGHT JUST DIDN'T LAST AND YOU DIDN'T HAVE MONEY TO GET MORE.: NEVER TRUE

## 2023-11-21 SDOH — ECONOMIC STABILITY: INCOME INSECURITY: HOW HARD IS IT FOR YOU TO PAY FOR THE VERY BASICS LIKE FOOD, HOUSING, MEDICAL CARE, AND HEATING?: NOT HARD AT ALL

## 2023-11-21 SDOH — ECONOMIC STABILITY: FOOD INSECURITY: WITHIN THE PAST 12 MONTHS, YOU WORRIED THAT YOUR FOOD WOULD RUN OUT BEFORE YOU GOT MONEY TO BUY MORE.: NEVER TRUE

## 2023-11-21 ASSESSMENT — ENCOUNTER SYMPTOMS
NAUSEA: 0
COUGH: 0
SHORTNESS OF BREATH: 0
BACK PAIN: 0
VOMITING: 0

## 2023-11-21 ASSESSMENT — PATIENT HEALTH QUESTIONNAIRE - PHQ9
1. LITTLE INTEREST OR PLEASURE IN DOING THINGS: 0
SUM OF ALL RESPONSES TO PHQ9 QUESTIONS 1 & 2: 0
SUM OF ALL RESPONSES TO PHQ QUESTIONS 1-9: 0
2. FEELING DOWN, DEPRESSED OR HOPELESS: 0

## 2023-11-21 NOTE — PROGRESS NOTES
Chief Complaint   Patient presents with    Diabetes    Follow-up Chronic Condition     1. \"Have you been to the ER, urgent care clinic since your last visit? Hospitalized since your last visit? \" Had bee sting and has epi pen now had swelling and shortness of breath. Hives. 2. \"Have you seen or consulted any other health care providers outside of the [address] [place] [address] Avenue since your last visit? \"  no    Eye exam - [facility] Dr. Benjamín Perez    A1c    Still having problem with ears
There is impacted cerumen. Ears:      Comments: Left ear canal is very dry and has wax     Nose: Nose normal.      Mouth/Throat:      Mouth: Mucous membranes are moist.      Pharynx: Oropharynx is clear. Eyes:      Extraocular Movements: Extraocular movements intact. Conjunctiva/sclera: Conjunctivae normal.      Pupils: Pupils are equal, round, and reactive to light. Cardiovascular:      Rate and Rhythm: Normal rate and regular rhythm. Pulses: Normal pulses. Heart sounds: Normal heart sounds. Pulmonary:      Effort: Pulmonary effort is normal.      Breath sounds: Normal breath sounds. Abdominal:      General: Abdomen is flat. Bowel sounds are normal.      Palpations: Abdomen is soft. Musculoskeletal:         General: Normal range of motion. Cervical back: Normal range of motion and neck supple. Skin:     General: Skin is warm and dry. Neurological:      General: No focal deficit present. Mental Status: She is alert and oriented to person, place, and time. Psychiatric:         Mood and Affect: Mood normal.         Behavior: Behavior normal.         Judgment: Judgment normal.       Diabetic foot exam:     Left Foot:   Visual Exam: normal    Pulse DP: 2+ (normal)   Filament test: normal sensation    Vibratory sensation: normal      Right Foot:   Visual Exam: normal  right middle toe has corn   Pulse DP: 2+ (normal)   Filament test: normal sensation    Vibratory sensation: normal      ASSESSMENT and PLAN  Brian Choe was seen today for diabetes and follow-up chronic condition. Diagnoses and all orders for this visit:    Type 2 diabetes mellitus without complication, without long-term current use of insulin ([facility])  A1c per POC today 6.0, down from A1c of 6.2 on 5/16/23. Pt continues with Metformin 500mg daily and Victoza 1.8mg daily.  Pt will have updated lab work performed during today's OV.  -     AMB POC HEMOGLOBIN A1C  -      [address] /

## 2023-12-06 DIAGNOSIS — E11.9 TYPE 2 DIABETES MELLITUS WITHOUT COMPLICATIONS (HCC): ICD-10-CM

## 2023-12-07 RX ORDER — LIRAGLUTIDE 6 MG/ML
1.8 INJECTION SUBCUTANEOUS DAILY
Qty: 9 ML | Refills: 5 | Status: SHIPPED | OUTPATIENT
Start: 2023-12-07

## 2023-12-07 NOTE — TELEPHONE ENCOUNTER
Patient requesting refill to [facility]. Thanks, thai    Last appointment: 11/21/23 Toñito  Next appointment: 5/21/24 To[place]ito  Previous refill encounter(s): 5/22/23 9ml + 2    Requested Prescriptions     Pending Prescriptions Disp Refills    Liraglutide (VICTOZA) 18 MG/3ML SOPN SC injection 9 mL 5     Sig: Inject 1.8 mg into the skin daily     For Pharmacy Admin Tracking Only    Program: Medication Refill  CPA in place:    Recommendation Provided To:    Intervention Detail: New Rx: 1, reason: Patient Preference  Intervention Accepted By:   Pravin Velazquez Closed?:    Time Spent (min): 5

## 2024-01-08 NOTE — TELEPHONE ENCOUNTER
PCP: Richi Sibley MD    Last appt: 11/21/2023       Future Appointments   Date Time Provider Department Center   5/21/2024  9:45 AM Richi Sibley MD PA[facility] BS AMB       Requested Prescriptions     Pending Prescriptions Disp Refills    atorvastatin (LIPITOR) 20 MG tablet 90 tablet 0       Prior labs and Blood pressures:  BP Readings from Last 3 Encounters:   11/21/23 138/60   07/02/23 115/71   05/16/23 136/80     Lab Results   Component Value Date/Time     11/21/2023 11:08 AM    K 4.5 11/21/2023 11:08 AM     11/21/2023 11:08 AM    CO2 29 11/21/2023 11:08 AM    BUN 18 11/21/2023 11:08 AM    GFRAA >60 05/02/2022 12:12 PM     Lab Results   Component Value Date/Time    [id number] 6.0 11/21/2023 10:17 AM     Lab Results   Component Value Date/Time    CHOL 180 11/21/2023 11:08 AM    HDL 83 11/21/2023 11:08 AM    VLDL 12 09/10/2020 12:00 AM     No results found for: \"VITD3\", \"VD3RIA\"    Lab Results   Component Value Date/Time    TSH 0.43 05/17/2021 03:08 PM

## 2024-01-09 RX ORDER — ATORVASTATIN CALCIUM 20 MG/1
TABLET, FILM COATED ORAL
Qty: 90 TABLET | Refills: 0 | Status: SHIPPED | OUTPATIENT
Start: 2024-01-09

## 2024-01-11 RX ORDER — ATORVASTATIN CALCIUM 20 MG/1
TABLET, FILM COATED ORAL
Qty: 90 TABLET | Refills: 1 | Status: SHIPPED | OUTPATIENT
Start: 2024-01-11

## 2024-01-30 RX ORDER — METFORMIN HYDROCHLORIDE 500 MG/1
500 TABLET, EXTENDED RELEASE ORAL
Qty: 90 TABLET | Refills: 1 | Status: SHIPPED | OUTPATIENT
Start: 2024-01-30

## 2024-02-14 DIAGNOSIS — I10 ESSENTIAL (PRIMARY) HYPERTENSION: ICD-10-CM

## 2024-02-14 RX ORDER — IRBESARTAN AND HYDROCHLOROTHIAZIDE 150; 12.5 MG/1; MG/1
TABLET, FILM COATED ORAL
Qty: 90 TABLET | Refills: 0 | Status: SHIPPED | OUTPATIENT
Start: 2024-02-14

## 2024-03-21 RX ORDER — PEN NEEDLE, DIABETIC 33 GX5/32"
NEEDLE, DISPOSABLE MISCELLANEOUS
Qty: 100 EACH | Refills: 5 | Status: SHIPPED | OUTPATIENT
Start: 2024-03-21

## 2024-05-13 DIAGNOSIS — I10 ESSENTIAL (PRIMARY) HYPERTENSION: ICD-10-CM

## 2024-05-14 RX ORDER — IRBESARTAN AND HYDROCHLOROTHIAZIDE 150; 12.5 MG/1; MG/1
TABLET, FILM COATED ORAL
Qty: 90 TABLET | Refills: 1 | Status: SHIPPED | OUTPATIENT
Start: 2024-05-14

## 2024-05-21 ENCOUNTER — OFFICE VISIT (OUTPATIENT)
Age: 64
End: 2024-05-21
Payer: COMMERCIAL

## 2024-05-21 VITALS
RESPIRATION RATE: 16 BRPM | DIASTOLIC BLOOD PRESSURE: 67 MMHG | TEMPERATURE: 97.8 F | SYSTOLIC BLOOD PRESSURE: 108 MMHG | BODY MASS INDEX: 29.98 KG/M2 | HEIGHT: 63 IN | OXYGEN SATURATION: 99 % | WEIGHT: 169.2 LBS | HEART RATE: 75 BPM

## 2024-05-21 DIAGNOSIS — R53.83 MALAISE AND FATIGUE: ICD-10-CM

## 2024-05-21 DIAGNOSIS — Z23 NEED FOR HEPATITIS B VACCINATION: ICD-10-CM

## 2024-05-21 DIAGNOSIS — Z23 NEED FOR TDAP VACCINATION: Primary | ICD-10-CM

## 2024-05-21 DIAGNOSIS — I10 PRIMARY HYPERTENSION: ICD-10-CM

## 2024-05-21 DIAGNOSIS — E11.9 TYPE 2 DIABETES MELLITUS WITHOUT COMPLICATION, WITHOUT LONG-TERM CURRENT USE OF INSULIN (HCC): ICD-10-CM

## 2024-05-21 DIAGNOSIS — R53.81 MALAISE AND FATIGUE: ICD-10-CM

## 2024-05-21 DIAGNOSIS — E78.5 HYPERLIPIDEMIA WITH TARGET LDL LESS THAN 100: ICD-10-CM

## 2024-05-21 DIAGNOSIS — H60.543 ECZEMA OF EXTERNAL EAR, BILATERAL: ICD-10-CM

## 2024-05-21 LAB
ALBUMIN SERPL-MCNC: 4.1 G/DL (ref 3.5–5)
ALBUMIN/GLOB SERPL: 1.2 (ref 1.1–2.2)
ALP SERPL-CCNC: 78 U/L (ref 45–117)
ALT SERPL-CCNC: 15 U/L (ref 12–78)
ANION GAP SERPL CALC-SCNC: 4 MMOL/L (ref 5–15)
AST SERPL-CCNC: 16 U/L (ref 15–37)
BILIRUB SERPL-MCNC: 0.5 MG/DL (ref 0.2–1)
BUN SERPL-MCNC: 20 MG/DL (ref 6–20)
BUN/CREAT SERPL: 16 (ref 12–20)
CALCIUM SERPL-MCNC: 10 MG/DL (ref 8.5–10.1)
CHLORIDE SERPL-SCNC: 106 MMOL/L (ref 97–108)
CHOLEST SERPL-MCNC: 179 MG/DL
CO2 SERPL-SCNC: 28 MMOL/L (ref 21–32)
CREAT SERPL-MCNC: 1.23 MG/DL (ref 0.55–1.02)
ERYTHROCYTE [DISTWIDTH] IN BLOOD BY AUTOMATED COUNT: 13.8 % (ref 11.5–14.5)
GLOBULIN SER CALC-MCNC: 3.3 G/DL (ref 2–4)
GLUCOSE SERPL-MCNC: 95 MG/DL (ref 65–100)
HBA1C MFR BLD: 6.5 %
HCT VFR BLD AUTO: 38.1 % (ref 35–47)
HDLC SERPL-MCNC: 83 MG/DL
HDLC SERPL: 2.2 (ref 0–5)
HGB BLD-MCNC: 12.9 G/DL (ref 11.5–16)
LDLC SERPL CALC-MCNC: 89.6 MG/DL (ref 0–100)
MCH RBC QN AUTO: 29.6 PG (ref 26–34)
MCHC RBC AUTO-ENTMCNC: 33.9 G/DL (ref 30–36.5)
MCV RBC AUTO: 87.4 FL (ref 80–99)
NRBC # BLD: 0 K/UL (ref 0–0.01)
NRBC BLD-RTO: 0 PER 100 WBC
PLATELET # BLD AUTO: 229 K/UL (ref 150–400)
PMV BLD AUTO: 9.7 FL (ref 8.9–12.9)
POTASSIUM SERPL-SCNC: 4.2 MMOL/L (ref 3.5–5.1)
PROT SERPL-MCNC: 7.4 G/DL (ref 6.4–8.2)
RBC # BLD AUTO: 4.36 M/UL (ref 3.8–5.2)
SODIUM SERPL-SCNC: 138 MMOL/L (ref 136–145)
T4 FREE SERPL-MCNC: 1.1 NG/DL (ref 0.8–1.5)
TRIGL SERPL-MCNC: 32 MG/DL
TSH SERPL DL<=0.05 MIU/L-ACNC: 0.5 UIU/ML (ref 0.36–3.74)
VLDLC SERPL CALC-MCNC: 6.4 MG/DL
WBC # BLD AUTO: 5.2 K/UL (ref 3.6–11)

## 2024-05-21 PROCEDURE — G8427 DOCREV CUR MEDS BY ELIG CLIN: HCPCS | Performed by: FAMILY MEDICINE

## 2024-05-21 PROCEDURE — 3078F DIAST BP <80 MM HG: CPT | Performed by: FAMILY MEDICINE

## 2024-05-21 PROCEDURE — 3074F SYST BP LT 130 MM HG: CPT | Performed by: FAMILY MEDICINE

## 2024-05-21 PROCEDURE — 90715 TDAP VACCINE 7 YRS/> IM: CPT | Performed by: FAMILY MEDICINE

## 2024-05-21 PROCEDURE — 3046F HEMOGLOBIN A1C LEVEL >9.0%: CPT | Performed by: FAMILY MEDICINE

## 2024-05-21 PROCEDURE — 1036F TOBACCO NON-USER: CPT | Performed by: FAMILY MEDICINE

## 2024-05-21 PROCEDURE — G8417 CALC BMI ABV UP PARAM F/U: HCPCS | Performed by: FAMILY MEDICINE

## 2024-05-21 PROCEDURE — 3017F COLORECTAL CA SCREEN DOC REV: CPT | Performed by: FAMILY MEDICINE

## 2024-05-21 PROCEDURE — 90739 HEPB VACC 2/4 DOSE ADULT IM: CPT | Performed by: FAMILY MEDICINE

## 2024-05-21 PROCEDURE — 90472 IMMUNIZATION ADMIN EACH ADD: CPT | Performed by: FAMILY MEDICINE

## 2024-05-21 PROCEDURE — 4130F TOPICAL PREP RX AOE: CPT | Performed by: FAMILY MEDICINE

## 2024-05-21 PROCEDURE — 99214 OFFICE O/P EST MOD 30 MIN: CPT | Performed by: FAMILY MEDICINE

## 2024-05-21 PROCEDURE — 83036 HEMOGLOBIN GLYCOSYLATED A1C: CPT | Performed by: FAMILY MEDICINE

## 2024-05-21 PROCEDURE — 90471 IMMUNIZATION ADMIN: CPT | Performed by: FAMILY MEDICINE

## 2024-05-21 PROCEDURE — 2022F DILAT RTA XM EVC RTNOPTHY: CPT | Performed by: FAMILY MEDICINE

## 2024-05-21 RX ORDER — FLUOCINOLONE ACETONIDE 0.11 MG/ML
1 OIL AURICULAR (OTIC) 2 TIMES DAILY
Qty: 20 ML | Refills: 0 | Status: SHIPPED | OUTPATIENT
Start: 2024-05-21

## 2024-05-21 ASSESSMENT — PATIENT HEALTH QUESTIONNAIRE - PHQ9
SUM OF ALL RESPONSES TO PHQ QUESTIONS 1-9: 1
SUM OF ALL RESPONSES TO PHQ9 QUESTIONS 1 & 2: 1
1. LITTLE INTEREST OR PLEASURE IN DOING THINGS: NOT AT ALL
SUM OF ALL RESPONSES TO PHQ QUESTIONS 1-9: 1
2. FEELING DOWN, DEPRESSED OR HOPELESS: SEVERAL DAYS

## 2024-05-21 NOTE — PROGRESS NOTES
Yesi Covarrubias 63 y.o. female  presents to the office today for follow up on chronic conditions.     Blood pressure 108/67, pulse 75, temperature 97.8 °F (36.6 °C), temperature source Temporal, resp. rate 16, height 1.6 m (5' 3\"), weight 76.7 kg (169 lb 3.2 oz), SpO2 99 %. Body mass index is 29.97 kg/m².   Chief Complaint   Patient presents with    Diabetes    Follow-up Chronic Condition    Hypertension    Cholesterol Problem      Pt reports she is overall doing well.     She has been feeling nauseous and generally \"blah\" over the last 2-3 days. She had one episode of vomiting 2 days ago. She wonders if this has to do with glucose running low (70s-80s). She typically feels off if her glucose is below 90. She has also been feeling more tired. She also reports some increased stress lately as she is in the process of retiring.     DM2: A1c per POC today 6.5, up from A1c of 6.0 on 11/21/23. Pt continues with Metformin 500mg daily and Victoza 1.8mg daily. She has been working on weight loss. Pertinent negatives for hypoglycemia include no dizziness, headaches or nervousness/anxiousness. Pertinent negatives for diabetes include no chest pain.     Hypertension: BP at office today 108/67. She reports some low readings (89 systolic). She doesn't check her BP regularly. She takes Avalide 150-12.5mg daily. Pertinent negatives include no chest pain, headaches, palpitations or shortness of breath.     Hyperlipidemia: Lipid panel on 11/21/23 was WNL. Pt continues with atorvastatin 20mg daily.      She requests refill of dermotic oil for her ears. She reports it helps.     She is looking forward to a mission trip to [facility] later this year.     Health Maintenance:   COVID Booster: encouraged to get before traveling  Heplisav: due, would like today  Tdap: 2011, due, would like today  RSV Vaccine: encouraged to get     Current Outpatient Medications   Medication Sig Dispense Refill    irbesartan-hydroCHLOROthiazide (AVALIDE) 150-12.5

## 2024-05-21 NOTE — PROGRESS NOTES
Chief Complaint   Patient presents with    Diabetes    Follow-up Chronic Condition    Hypertension    Cholesterol Problem     \"Have you been to the ER, urgent care clinic since your last visit?  Hospitalized since your last visit?\"    NO    “Have you seen or consulted any other health care providers outside of [facility] since your last visit?”    NO            Click Here for Release of Records Request             5/21/2024     9:52 AM   PHQ-9    Little interest or pleasure in doing things 0   Feeling down, depressed, or hopeless 1   PHQ-2 Score 1   PHQ-9 Total Score 1           Financial Resource Strain: Low Risk  (11/21/2023)    Overall Financial Resource Strain (CARDIA)     Difficulty of Paying Living Expenses: Not hard at all      Food Insecurity: Not on file (11/21/2023)          Health Maintenance Due   Topic Date Due    Hepatitis B vaccine (2 of 3 - 19+ 3-dose series) 09/14/2017    Respiratory Syncytial Virus (RSV) Pregnant or age 60 yrs+ (1 - 1-dose 60+ series) Never done    DTaP/Tdap/Td vaccine (2 - Td or Tdap) 12/02/2021    Diabetic retinal exam  03/26/2022    COVID-19 Vaccine (3 - 2023-24 season) 09/01/2023

## 2024-05-29 NOTE — RESULT ENCOUNTER NOTE
Your renal function is elevated I need you to be well-hydrated and to check it again in about 2 weeks.    I have placed an order for lab order please call the clinic to make a lab appointment    If you have any questions let me know the rest of your labs are stable

## 2024-06-20 DIAGNOSIS — E11.9 TYPE 2 DIABETES MELLITUS WITHOUT COMPLICATION, WITHOUT LONG-TERM CURRENT USE OF INSULIN (HCC): ICD-10-CM

## 2024-06-20 LAB
ANION GAP SERPL CALC-SCNC: 7 MMOL/L (ref 5–15)
BUN SERPL-MCNC: 24 MG/DL (ref 6–20)
BUN/CREAT SERPL: 21 (ref 12–20)
CALCIUM SERPL-MCNC: 9.4 MG/DL (ref 8.5–10.1)
CHLORIDE SERPL-SCNC: 105 MMOL/L (ref 97–108)
CO2 SERPL-SCNC: 26 MMOL/L (ref 21–32)
CREAT SERPL-MCNC: 1.14 MG/DL (ref 0.55–1.02)
GLUCOSE SERPL-MCNC: 169 MG/DL (ref 65–100)
POTASSIUM SERPL-SCNC: 4.6 MMOL/L (ref 3.5–5.1)
SODIUM SERPL-SCNC: 138 MMOL/L (ref 136–145)

## 2024-06-21 NOTE — RESULT ENCOUNTER NOTE
Your renal function has improved some we will continue to monitor please remember to be well-hydrated avoid any NSAIDs and when I see you again we will evaluate.    Keep an eye on your blood sugar.    If you have any questions let me know

## 2024-06-26 ENCOUNTER — NURSE ONLY (OUTPATIENT)
Age: 64
End: 2024-06-26
Payer: COMMERCIAL

## 2024-06-26 DIAGNOSIS — Z23 NEED FOR VACCINATION AGAINST HEPATITIS B VIRUS: Primary | ICD-10-CM

## 2024-06-26 PROCEDURE — 90739 HEPB VACC 2/4 DOSE ADULT IM: CPT | Performed by: FAMILY MEDICINE

## 2024-06-26 PROCEDURE — 90471 IMMUNIZATION ADMIN: CPT | Performed by: FAMILY MEDICINE

## 2024-06-28 NOTE — TELEPHONE ENCOUNTER
Last appointment: 5/21/24 [place]  Next appointment: 11/26/24 [place]  Previous refill encounter(s): 1/30/24 90 + 1    Requested Prescriptions     Pending Prescriptions Disp Refills    metFORMIN (GLUCOPHAGE-XR) 500 MG extended release tablet [Pharmacy Med Name: METFORMIN HCL  MG ORAL TABLET EXTENDED RELEASE 24 HOUR] 90 tablet 1     Sig: Take 1 tablet by mouth Daily with supper     For Pharmacy Admin Tracking Only    Program: Medication Refill  CPA in place:    Recommendation Provided To:   Intervention Detail: New Rx: 1, reason: Patient Preference  Intervention Accepted By:   Gap Closed?:    Time Spent (min): 5

## 2024-06-29 RX ORDER — METFORMIN HYDROCHLORIDE 500 MG/1
500 TABLET, EXTENDED RELEASE ORAL
Qty: 90 TABLET | Refills: 1 | Status: SHIPPED | OUTPATIENT
Start: 2024-06-29

## 2024-07-08 RX ORDER — ATORVASTATIN CALCIUM 20 MG/1
TABLET, FILM COATED ORAL
Qty: 90 TABLET | Refills: 1 | Status: SHIPPED | OUTPATIENT
Start: 2024-07-08

## 2024-07-29 ENCOUNTER — TELEPHONE (OUTPATIENT)
Age: 64
End: 2024-07-29

## 2024-07-29 NOTE — TELEPHONE ENCOUNTER
Pharmacy Progress Note     Pt contacted office stating she is unable to get Victoza d/t backorder until 8/15/24.      Contacted insurance in order to investigate coverage of other GLP-1 agonists.  Covered alternatives:  - Ozempic  - Mounjaro  - Trulicity    Attempted to contact pt via phone to discuss alternatives and checking pharmacies for supply.  Unable to reach.  LVMTCB.    There are no discontinued medications.  No orders of the defined types were placed in this encounter.        Shelley De Los Santos, PharmD, BCGP, BCACP  Clinical Pharmacist Specialist      For Pharmacy Admin Tracking Only    Program: Medical Group  CPA in place:  Yes  Recommendation Provided To: Patient/Caregiver: 1 via Telephone and Other: 1  Intervention Detail: Benefit Assistance  Intervention Accepted By: Patient/Caregiver: 1 and Other: 1  Time Spent (min): 30

## 2024-07-30 ENCOUNTER — TELEPHONE (OUTPATIENT)
Age: 64
End: 2024-07-30

## 2024-07-30 NOTE — TELEPHONE ENCOUNTER
Pharmacy Progress Note - Telephone Call    Ms. Yesi Covarrubias 64 y.o. was contacted via an outbound telephone call regarding returning pt's call concerning Victoza shortage today.  A voicemail was left for patient to return my call.  This writer's work mobile number was provided as a callback contact - [phone] (option #2).      Shelley De Los Santos, Yanira, BCGP, BCACP  Clinical Pharmacist Specialist        For Pharmacy Admin Tracking Only    Program: Medical Group  CPA in place:  Yes  Recommendation Provided To: Patient/Caregiver: 0 via Telephone  Intervention Accepted By: Patient/Caregiver: 0  Time Spent (min): 5

## 2024-07-30 NOTE — TELEPHONE ENCOUNTER
----- Message from Taylor Moulton LPN sent at 7/30/2024 10:43 AM EDT -----  Regarding: FW: Victoza is backordered and I am out  Contact: [phone]    ----- Message -----  From: Yesi Covarrubias  Sent: 7/30/2024   8:27 AM EDT  To: Juan Bernabe Clinical Staff  Subject: Victoza is backordered and I am out              hello,  any updates??

## 2024-07-31 ENCOUNTER — TELEPHONE (OUTPATIENT)
Age: 64
End: 2024-07-31

## 2024-07-31 DIAGNOSIS — E11.9 TYPE 2 DIABETES MELLITUS WITHOUT COMPLICATION, WITHOUT LONG-TERM CURRENT USE OF INSULIN (HCC): Primary | ICD-10-CM

## 2024-07-31 RX ORDER — SEMAGLUTIDE 0.68 MG/ML
0.25 INJECTION, SOLUTION SUBCUTANEOUS WEEKLY
Qty: 3 ML | Refills: 1 | Status: SHIPPED | OUTPATIENT
Start: 2024-07-31

## 2024-07-31 NOTE — TELEPHONE ENCOUNTER
Pharmacy Progress Note     Contacted pt via phone to discuss access issue with Victoza.  Unable to receive consistently.    Discussed insurance covers Trulicity, Mounjaro, and Ozempic as well.  Trulicity and Mounjaro are also having shortage issues.    Discussed switching to Ozempic.  Pt was instructed on mechanism, efficacy, side effects, storage, what to do if dose is missed, administration, and disposal of medication.      STOP Victoza  START Ozempic 0.25 mg weekly    Scheduled f/up in 4 weeks to check in      Medications Discontinued During This Encounter   Medication Reason    Liraglutide (VICTOZA) 18 MG/3ML SOPN SC injection Formulary change     Orders Placed This Encounter    Semaglutide,0.25 or 0.5MG/DOS, (OZEMPIC, 0.25 OR 0.5 MG/DOSE,) 2 MG/3ML SOPN     Sig: Inject 0.25 mg into the skin once a week     Dispense:  3 mL     Refill:  Maddison De Los Santos PharmD, BCGP, BCACP  Clinical Pharmacist Specialist      For Pharmacy Admin Tracking Only    Program: Medical Group  CPA in place:  Yes  Recommendation Provided To: Patient/Caregiver: 6 via Telephone and [employer] Message  Intervention Detail: Benefit Assistance, Device Training, Discontinued Rx: 1, reason: Cost/Formulary Change, New Rx: 1, reason: Cost/Formulary Change, Patient Access Assistance/Sample Provided, and Scheduled Appointment  Intervention Accepted By: Patient/Caregiver: 6  Time Spent (min): 15

## 2024-07-31 NOTE — TELEPHONE ENCOUNTER
Patient called back to speak with you about her Victoza. She would like a call back at [phone]

## 2024-08-28 ENCOUNTER — PHARMACY VISIT (OUTPATIENT)
Dept: PRIMARY CARE CLINIC | Facility: CLINIC | Age: 64
End: 2024-08-28

## 2024-08-28 DIAGNOSIS — E11.9 TYPE 2 DIABETES MELLITUS WITHOUT COMPLICATION, WITHOUT LONG-TERM CURRENT USE OF INSULIN (HCC): Primary | ICD-10-CM

## 2024-08-28 RX ORDER — SEMAGLUTIDE 0.68 MG/ML
0.5 INJECTION, SOLUTION SUBCUTANEOUS
Qty: 3 ML | Refills: 1 | Status: SHIPPED | OUTPATIENT
Start: 2024-08-28

## 2024-08-28 NOTE — PROGRESS NOTES
Pharmacy Progress Note - Diabetes Management    S/O: Ms. Yesi Covarrubias is a 64 y.o. female, referred by Richi Murdock MD, with a PMH of HTN, T2DM, eczema, HLD, obesity, vitamin D deficiency, was seen today for diabetes management.  Patient's last A1c was 6.5% (May 2024).       Visit was completed using real time audio visual technology, Zollo video.    Interim update: Pt was contacted by this writer to discuss medications.  Pt had not been able to start Victoza d/t shortage concerns.  Rx for Ozempic was sent in.    Pt logs into virtual visit and states that she received Ozempic and completed three doses - two 0.25 mg doses and one 0.5 mg dose.  Describes being nauseas in the morning which decreases her appetite.  She eats light but carbohydrate heavier snacks/meals.      Current anti-hyperglycemic regimen includes:    - Ozempic 0.25 mg weekly  - Metformin  mg QPM supper    ROS:  Today, Pt endorses:  - Symptoms of Hyperglycemia: none  - Symptoms of Hypoglycemia: clumsy or jerky movements and confusion    Self Monitoring Blood Glucose (SMBG) or CGM:  - Brought in home glucometer/blood glucose log/CGM reader today:  no  Fasting BG 80s  This AM fasting BG was 86 mg/dL  ppBG rdgs 120s      Vitals:  Wt Readings from Last 3 Encounters:   05/21/24 76.7 kg (169 lb 3.2 oz)   11/21/23 77.1 kg (170 lb)   05/16/23 77.1 kg (170 lb)     BP Readings from Last 3 Encounters:   05/21/24 108/67   11/21/23 138/60   07/02/23 115/71     Pulse Readings from Last 3 Encounters:   05/21/24 75   11/21/23 75   07/02/23 74       Past Medical History:   Diagnosis Date    Diabetes (HCC)     Hypercholesterolemia     Hypertension      Allergies   Allergen Reactions    Bee Venom Anaphylaxis    Penicillins Rash       Current Outpatient Medications   Medication Sig    Semaglutide,0.25 or 0.5MG/DOS, (OZEMPIC, 0.25 OR 0.5 MG/DOSE,) 2 MG/3ML SOPN Inject 0.25 mg into the skin once a week    atorvastatin (LIPITOR) 20 MG tablet TAKE ONE  TABLET BY MOUTH DAILY AT BEDTIME    metFORMIN (GLUCOPHAGE-XR) 500 MG extended release tablet Take 1 tablet by mouth Daily with supper    fluocinolone (DERMOTIC) 0.01 % OIL oil Place 1 drop in ear(s) 2 times daily    irbesartan-hydroCHLOROthiazide (AVALIDE) 150-12.5 MG per tablet TAKE 1 TABLET BY MOUTH EVERY DAY    UNIFINE PENTIPS 33G X 4 MM MISC USE AS DIRECTED DAILY    diclofenac (VOLTAREN) 75 MG EC tablet TAKE 1 TABLET BY MOUTH 2 TIMES DAILY AS NEEDED FOR PAIN    EPINEPHrine (EPIPEN 2-CHARMAINE) 0.3 MG/0.3ML SOAJ injection Inject 0.3 mLs into the muscle once for 1 dose Use as directed for allergic reaction    fluticasone (FLONASE) 50 MCG/ACT nasal spray INSTILL 2 SPRAYS EVEYR DAY UNTIL DIRECTED TO STOP     No current facility-administered medications for this visit.     Lab Results   Component Value Date/Time     06/20/2024 02:45 PM    K 4.6 06/20/2024 02:45 PM     06/20/2024 02:45 PM    CO2 26 06/20/2024 02:45 PM    BUN 24 06/20/2024 02:45 PM    GFRAA >60 05/02/2022 12:12 PM    GLOB 3.3 05/21/2024 10:47 AM    ALT 15 05/21/2024 10:47 AM     Lab Results   Component Value Date/Time    CHOL 179 05/21/2024 10:47 AM    HDL 83 05/21/2024 10:47 AM    LDL 89.6 05/21/2024 10:47 AM    LDL 89 11/21/2023 11:08 AM    VLDL 6.4 05/21/2024 10:47 AM    VLDL 12 09/10/2020 12:00 AM     Lab Results   Component Value Date/Time    WBC 5.2 05/21/2024 10:47 AM    HGB 12.9 05/21/2024 10:47 AM    HCT 38.1 05/21/2024 10:47 AM     05/21/2024 10:47 AM    MCV 87.4 05/21/2024 10:47 AM       Lab Results   Component Value Date/Time    MICROALBUR 0.81 11/21/2023 11:08 AM    MICROALBUR 1.11 10/11/2022 04:35 PM       Lab Results   Component Value Date    MALBCR 8 11/21/2023    MALBCR 6 10/11/2022    MALBCR 6 05/06/2021     No components found for: \"MALBCREARAT\"     Lab Results   Component Value Date    LABA1C 5.9 (H) 11/27/2020     Hemoglobin A1C, POC   Date Value Ref Range Status   05/21/2024 6.5 % Final           CrCl cannot be

## 2024-09-24 ENCOUNTER — PHARMACY VISIT (OUTPATIENT)
Age: 64
End: 2024-09-24

## 2024-09-24 DIAGNOSIS — I10 ESSENTIAL (PRIMARY) HYPERTENSION: ICD-10-CM

## 2024-09-24 DIAGNOSIS — E11.9 TYPE 2 DIABETES MELLITUS WITHOUT COMPLICATION, WITHOUT LONG-TERM CURRENT USE OF INSULIN (HCC): Primary | ICD-10-CM

## 2024-09-24 RX ORDER — IRBESARTAN AND HYDROCHLOROTHIAZIDE 150; 12.5 MG/1; MG/1
1 TABLET, FILM COATED ORAL DAILY
Qty: 90 TABLET | Refills: 1 | Status: SHIPPED | OUTPATIENT
Start: 2024-09-24

## 2024-09-24 RX ORDER — ATORVASTATIN CALCIUM 20 MG/1
TABLET, FILM COATED ORAL
Qty: 90 TABLET | Refills: 1 | Status: SHIPPED | OUTPATIENT
Start: 2024-09-24

## 2024-09-24 RX ORDER — SEMAGLUTIDE 0.68 MG/ML
0.5 INJECTION, SOLUTION SUBCUTANEOUS
Qty: 9 ML | Refills: 1 | Status: SHIPPED | OUTPATIENT
Start: 2024-09-24

## 2024-10-02 ENCOUNTER — PATIENT MESSAGE (OUTPATIENT)
Age: 64
End: 2024-10-02

## 2024-10-02 NOTE — TELEPHONE ENCOUNTER
For Pharmacy Admin Tracking Only    Program: Medical Group  CPA in place:  Yes  Recommendation Provided To: Patient/Caregiver: 1 via [employer] Records Message  Intervention Detail: Referral to Other Provider  Intervention Accepted By: Patient/Caregiver: 1  Time Spent (min): 5

## 2024-11-17 ENCOUNTER — PATIENT MESSAGE (OUTPATIENT)
Age: 64
End: 2024-11-17

## 2024-11-17 DIAGNOSIS — Z71.84 TRAVEL ADVICE ENCOUNTER: Primary | ICD-10-CM

## 2024-11-19 RX ORDER — CIPROFLOXACIN 500 MG/1
500 TABLET, FILM COATED ORAL 2 TIMES DAILY
Qty: 20 TABLET | Refills: 0 | Status: SHIPPED | OUTPATIENT
Start: 2024-11-19 | End: 2024-11-29

## 2024-11-19 RX ORDER — ATOVAQUONE AND PROGUANIL HYDROCHLORIDE 250; 100 MG/1; MG/1
TABLET, FILM COATED ORAL
Qty: 16 TABLET | Refills: 0 | Status: SHIPPED | OUTPATIENT
Start: 2024-11-19

## 2024-11-19 NOTE — TELEPHONE ENCOUNTER
Rx request sent to  -     Advised if she cannot wait til tomorrow for  to review she will need to go to the travel clinic

## 2025-02-12 NOTE — ED NOTES
Patient resting with eyes closed at this time. Patient is easily awakened. Patient voice is nearly back to normal. Patient states she is feeling \"much better\". Patient face is no longer flushed, hives on arms are minimal. Vitals remain WNL.       Bill Macario RN  07/02/23 [id number] Withheld/Decline to Answer

## 2025-02-17 SDOH — ECONOMIC STABILITY: TRANSPORTATION INSECURITY
IN THE PAST 12 MONTHS, HAS THE LACK OF TRANSPORTATION KEPT YOU FROM MEDICAL APPOINTMENTS OR FROM GETTING MEDICATIONS?: NO

## 2025-02-17 SDOH — ECONOMIC STABILITY: FOOD INSECURITY: WITHIN THE PAST 12 MONTHS, THE FOOD YOU BOUGHT JUST DIDN'T LAST AND YOU DIDN'T HAVE MONEY TO GET MORE.: NEVER TRUE

## 2025-02-17 SDOH — ECONOMIC STABILITY: INCOME INSECURITY: IN THE LAST 12 MONTHS, WAS THERE A TIME WHEN YOU WERE NOT ABLE TO PAY THE MORTGAGE OR RENT ON TIME?: NO

## 2025-02-17 SDOH — ECONOMIC STABILITY: FOOD INSECURITY: WITHIN THE PAST 12 MONTHS, YOU WORRIED THAT YOUR FOOD WOULD RUN OUT BEFORE YOU GOT MONEY TO BUY MORE.: NEVER TRUE

## 2025-02-17 SDOH — ECONOMIC STABILITY: TRANSPORTATION INSECURITY
IN THE PAST 12 MONTHS, HAS LACK OF TRANSPORTATION KEPT YOU FROM MEETINGS, WORK, OR FROM GETTING THINGS NEEDED FOR DAILY LIVING?: NO

## 2025-02-18 ENCOUNTER — OFFICE VISIT (OUTPATIENT)
Age: 65
End: 2025-02-18
Payer: COMMERCIAL

## 2025-02-18 VITALS
OXYGEN SATURATION: 100 % | HEART RATE: 94 BPM | HEIGHT: 63 IN | BODY MASS INDEX: 27.46 KG/M2 | SYSTOLIC BLOOD PRESSURE: 126 MMHG | DIASTOLIC BLOOD PRESSURE: 85 MMHG | TEMPERATURE: 97.1 F | WEIGHT: 155 LBS | RESPIRATION RATE: 16 BRPM

## 2025-02-18 DIAGNOSIS — R79.89 ELEVATED SERUM CREATININE: ICD-10-CM

## 2025-02-18 DIAGNOSIS — Z12.31 ENCOUNTER FOR SCREENING MAMMOGRAM FOR BREAST CANCER: ICD-10-CM

## 2025-02-18 DIAGNOSIS — I10 PRIMARY HYPERTENSION: ICD-10-CM

## 2025-02-18 DIAGNOSIS — H60.543 ECZEMA OF EXTERNAL EAR, BILATERAL: ICD-10-CM

## 2025-02-18 DIAGNOSIS — E78.5 HYPERLIPIDEMIA WITH TARGET LDL LESS THAN 100: ICD-10-CM

## 2025-02-18 DIAGNOSIS — E11.9 TYPE 2 DIABETES MELLITUS WITHOUT COMPLICATION, WITHOUT LONG-TERM CURRENT USE OF INSULIN (HCC): Primary | ICD-10-CM

## 2025-02-18 LAB — HBA1C MFR BLD: 5.9 %

## 2025-02-18 PROCEDURE — 3046F HEMOGLOBIN A1C LEVEL >9.0%: CPT | Performed by: FAMILY MEDICINE

## 2025-02-18 PROCEDURE — 3017F COLORECTAL CA SCREEN DOC REV: CPT | Performed by: FAMILY MEDICINE

## 2025-02-18 PROCEDURE — 4130F TOPICAL PREP RX AOE: CPT | Performed by: FAMILY MEDICINE

## 2025-02-18 PROCEDURE — 83036 HEMOGLOBIN GLYCOSYLATED A1C: CPT | Performed by: FAMILY MEDICINE

## 2025-02-18 PROCEDURE — G8417 CALC BMI ABV UP PARAM F/U: HCPCS | Performed by: FAMILY MEDICINE

## 2025-02-18 PROCEDURE — 3074F SYST BP LT 130 MM HG: CPT | Performed by: FAMILY MEDICINE

## 2025-02-18 PROCEDURE — 1036F TOBACCO NON-USER: CPT | Performed by: FAMILY MEDICINE

## 2025-02-18 PROCEDURE — 3079F DIAST BP 80-89 MM HG: CPT | Performed by: FAMILY MEDICINE

## 2025-02-18 PROCEDURE — 2022F DILAT RTA XM EVC RTNOPTHY: CPT | Performed by: FAMILY MEDICINE

## 2025-02-18 PROCEDURE — G8427 DOCREV CUR MEDS BY ELIG CLIN: HCPCS | Performed by: FAMILY MEDICINE

## 2025-02-18 PROCEDURE — 3044F HG A1C LEVEL LT 7.0%: CPT | Performed by: FAMILY MEDICINE

## 2025-02-18 PROCEDURE — 99214 OFFICE O/P EST MOD 30 MIN: CPT | Performed by: FAMILY MEDICINE

## 2025-02-18 RX ORDER — FLUOCINOLONE ACETONIDE 0.11 MG/ML
1 OIL AURICULAR (OTIC) 2 TIMES DAILY
Qty: 20 ML | Refills: 0 | Status: CANCELLED | OUTPATIENT
Start: 2025-02-18

## 2025-02-18 ASSESSMENT — PATIENT HEALTH QUESTIONNAIRE - PHQ9
1. LITTLE INTEREST OR PLEASURE IN DOING THINGS: NOT AT ALL
SUM OF ALL RESPONSES TO PHQ QUESTIONS 1-9: 0
2. FEELING DOWN, DEPRESSED OR HOPELESS: NOT AT ALL
SUM OF ALL RESPONSES TO PHQ QUESTIONS 1-9: 0
SUM OF ALL RESPONSES TO PHQ9 QUESTIONS 1 & 2: 0
SUM OF ALL RESPONSES TO PHQ QUESTIONS 1-9: 0
SUM OF ALL RESPONSES TO PHQ QUESTIONS 1-9: 0

## 2025-02-18 NOTE — PROGRESS NOTES
Chief Complaint   Patient presents with    Hypertension    Diabetes     \"Have you been to the ER, urgent care clinic since your last visit?  Hospitalized since your last visit?\"    no    “Have you seen or consulted any other health care providers outside our system since your last visit?”    no    Have you had a mammogram?”     no  Date of last Mammogram: 2/8/2023     A1c          
Albumin/Creatinine Ratio, Urine; Future  -      DIABETES FOOT EXAM  -     AMB POC HEMOGLOBIN A1C  -     Comprehensive Metabolic Panel; Future    2. Primary hypertension  BP is controlled, but she has had some low readings at home. With weight loss, her BP will likely reduce further. She continues with Avalide 150-12.5mg daily for now. Advised pt that if BP is low and she is lightheaded, she can reduce to half a tablet of the Avalide.   -     Comprehensive Metabolic Panel; Future  -     CBC with Auto Differential; Future    3. Hyperlipidemia with target LDL less than 100  Presumed stable, will recheck labs. Lipid panel on 5/21/24 notable for total cholesterol 179, HDL 83, LDL 89.6, and triglycerides 32. Pt continues with atorvastatin 20mg daily.    -     Lipid Panel; Future    4. Eczema of external ear, bilateral  She continues with Dermotic 0.01% oil.     5. Encounter for screening mammogram for breast cancer  Pt is due for an updated mammogram; orders have been placed for this imaging to be performed.  -     [facility]ELLE DIGITAL SCREEN BILATERAL [[id number]]; Future        Return in about 6 months (around 8/18/2025) for Diabetes.      Medication risks/benefits/costs/interactions/alternatives discussed with patient.  Advised patient to call back or return to office if symptoms worsen/change/persist.  If patient cannot reach us or should anything more severe/urgent arise he/she should proceed directly to the nearest emergency department.  Discussed expected course/resolution/complications of diagnosis in detail with patient.    Patient given a written after visit summary which includes diagnoses, current medications and vitals.  Patient expressed understanding with the diagnosis and plan.    Jannet OJEDA, am scribing for and in the presence of Richi Sibley MD. 2/18/25/8:27 AM EST    Dr. Richi OJEDA MD, personally performed the services  described in this documentation as scribed by Reyna Benton in my

## 2025-02-19 LAB
ALBUMIN SERPL-MCNC: 4.5 G/DL (ref 3.5–5)
ALBUMIN/GLOB SERPL: 1.4 (ref 1.1–2.2)
ALP SERPL-CCNC: 91 U/L (ref 45–117)
ALT SERPL-CCNC: 22 U/L (ref 12–78)
ANION GAP SERPL CALC-SCNC: 4 MMOL/L (ref 2–12)
AST SERPL-CCNC: 18 U/L (ref 15–37)
BASOPHILS # BLD: 0.06 K/UL (ref 0–0.1)
BASOPHILS NFR BLD: 1.1 % (ref 0–1)
BILIRUB SERPL-MCNC: 0.5 MG/DL (ref 0.2–1)
BUN SERPL-MCNC: 20 MG/DL (ref 6–20)
BUN/CREAT SERPL: 16 (ref 12–20)
CALCIUM SERPL-MCNC: 9.9 MG/DL (ref 8.5–10.1)
CHLORIDE SERPL-SCNC: 108 MMOL/L (ref 97–108)
CHOLEST SERPL-MCNC: 183 MG/DL
CO2 SERPL-SCNC: 29 MMOL/L (ref 21–32)
CREAT SERPL-MCNC: 1.24 MG/DL (ref 0.55–1.02)
CREAT UR-MCNC: 198 MG/DL
DIFFERENTIAL METHOD BLD: ABNORMAL
EOSINOPHIL # BLD: 0.05 K/UL (ref 0–0.4)
EOSINOPHIL NFR BLD: 0.9 % (ref 0–7)
ERYTHROCYTE [DISTWIDTH] IN BLOOD BY AUTOMATED COUNT: 14 % (ref 11.5–14.5)
GLOBULIN SER CALC-MCNC: 3.3 G/DL (ref 2–4)
GLUCOSE SERPL-MCNC: 107 MG/DL (ref 65–100)
HCT VFR BLD AUTO: 39.9 % (ref 35–47)
HDLC SERPL-MCNC: 77 MG/DL
HDLC SERPL: 2.4 (ref 0–5)
HGB BLD-MCNC: 12.6 G/DL (ref 11.5–16)
IMM GRANULOCYTES # BLD AUTO: 0.01 K/UL (ref 0–0.04)
IMM GRANULOCYTES NFR BLD AUTO: 0.2 % (ref 0–0.5)
LDLC SERPL CALC-MCNC: 96.6 MG/DL (ref 0–100)
LYMPHOCYTES # BLD: 2.3 K/UL (ref 0.8–3.5)
LYMPHOCYTES NFR BLD: 43.1 % (ref 12–49)
MCH RBC QN AUTO: 29.8 PG (ref 26–34)
MCHC RBC AUTO-ENTMCNC: 31.6 G/DL (ref 30–36.5)
MCV RBC AUTO: 94.3 FL (ref 80–99)
MICROALBUMIN UR-MCNC: 0.79 MG/DL
MICROALBUMIN/CREAT UR-RTO: 4 MG/G (ref 0–30)
MONOCYTES # BLD: 0.45 K/UL (ref 0–1)
MONOCYTES NFR BLD: 8.4 % (ref 5–13)
NEUTS SEG # BLD: 2.47 K/UL (ref 1.8–8)
NEUTS SEG NFR BLD: 46.3 % (ref 32–75)
NRBC # BLD: 0 K/UL (ref 0–0.01)
NRBC BLD-RTO: 0 PER 100 WBC
PLATELET # BLD AUTO: 237 K/UL (ref 150–400)
PMV BLD AUTO: 10.3 FL (ref 8.9–12.9)
POTASSIUM SERPL-SCNC: 4.5 MMOL/L (ref 3.5–5.1)
PROT SERPL-MCNC: 7.8 G/DL (ref 6.4–8.2)
RBC # BLD AUTO: 4.23 M/UL (ref 3.8–5.2)
SODIUM SERPL-SCNC: 141 MMOL/L (ref 136–145)
TRIGL SERPL-MCNC: 47 MG/DL
VLDLC SERPL CALC-MCNC: 9.4 MG/DL
WBC # BLD AUTO: 5.3 K/UL (ref 3.6–11)

## 2025-02-20 NOTE — RESULT ENCOUNTER NOTE
An area of concern that I have noticed is your renal function where you well-hydrated when he had the blood work?  If not we need to recheck it again in about 2 weeks.  If it still shows the same results then we may need to get a kidney  doctor just to check  the kidney function.    Try to drink at least 6-8  8 oz cups of water prior to lab work.    Try to avoid taking any NSAIDs such as Motrin or Aleve.    Complete blood count is stable.    Cholesterol numbers are looking good, there has just been a slight increase in your LDL and a decrease in your HDL.    Please work on cardio exercise and more fiber in your diet    Urine microalbumin ratio is normal

## 2025-03-10 ENCOUNTER — RESULTS FOLLOW-UP (OUTPATIENT)
Age: 65
End: 2025-03-10

## 2025-03-17 DIAGNOSIS — R79.89 ELEVATED SERUM CREATININE: ICD-10-CM

## 2025-03-17 LAB
ANION GAP SERPL CALC-SCNC: 5 MMOL/L (ref 2–12)
BUN SERPL-MCNC: 19 MG/DL (ref 6–20)
BUN/CREAT SERPL: 17 (ref 12–20)
CALCIUM SERPL-MCNC: 9.5 MG/DL (ref 8.5–10.1)
CHLORIDE SERPL-SCNC: 105 MMOL/L (ref 97–108)
CO2 SERPL-SCNC: 28 MMOL/L (ref 21–32)
CREAT SERPL-MCNC: 1.12 MG/DL (ref 0.55–1.02)
GLUCOSE SERPL-MCNC: 107 MG/DL (ref 65–100)
POTASSIUM SERPL-SCNC: 4 MMOL/L (ref 3.5–5.1)
SODIUM SERPL-SCNC: 138 MMOL/L (ref 136–145)

## 2025-03-19 NOTE — RESULT ENCOUNTER NOTE
Renal function has improved please remember to be well-hydrated.    If you have any questions let me know

## 2025-04-18 DIAGNOSIS — E11.9 TYPE 2 DIABETES MELLITUS WITHOUT COMPLICATION, WITHOUT LONG-TERM CURRENT USE OF INSULIN: ICD-10-CM

## 2025-04-18 RX ORDER — SEMAGLUTIDE 0.68 MG/ML
0.5 INJECTION, SOLUTION SUBCUTANEOUS
Qty: 9 ML | Refills: 1 | Status: SHIPPED | OUTPATIENT
Start: 2025-04-18

## 2025-04-18 NOTE — TELEPHONE ENCOUNTER
PCP: Richi Sibley MD    Last appt: 2/18/2025   No future appointments.    Requested Prescriptions     Pending Prescriptions Disp Refills    Semaglutide,0.25 or 0.5MG/DOS, (OZEMPIC, 0.25 OR 0.5 MG/DOSE,) 2 MG/3ML SOPN 9 mL 1     Sig: Inject 0.5 mg into the skin every 7 days

## 2025-04-23 DIAGNOSIS — E11.9 TYPE 2 DIABETES MELLITUS WITHOUT COMPLICATION, WITHOUT LONG-TERM CURRENT USE OF INSULIN (HCC): ICD-10-CM

## 2025-04-24 NOTE — TELEPHONE ENCOUNTER
Last appointment: 2/18/25 [place], lipid /2025  Next appointment: None  Previous refill encounter(s): 9/24/24 90+ 1    Requested Prescriptions     Pending Prescriptions Disp Refills    atorvastatin (LIPITOR) 20 MG tablet [Pharmacy Med Name: ATORVASTATIN 20 MG TABLET] 90 tablet 1     Sig: Take 1 tablet by mouth nightly     For Pharmacy Admin Tracking Only    Program: Medication Refill  CPA in place:    Recommendation Provided To:   Intervention Detail: New Rx: 1, reason: Patient Preference  Intervention Accepted By:   Gap Closed?:    Time Spent (min): 5

## 2025-04-25 RX ORDER — ATORVASTATIN CALCIUM 20 MG/1
20 TABLET, FILM COATED ORAL
Qty: 90 TABLET | Refills: 1 | Status: SHIPPED | OUTPATIENT
Start: 2025-04-25

## 2025-05-22 DIAGNOSIS — I10 ESSENTIAL (PRIMARY) HYPERTENSION: ICD-10-CM

## 2025-05-22 RX ORDER — IRBESARTAN AND HYDROCHLOROTHIAZIDE 150; 12.5 MG/1; MG/1
1 TABLET, FILM COATED ORAL DAILY
Qty: 90 TABLET | Refills: 1 | Status: SHIPPED | OUTPATIENT
Start: 2025-05-22

## 2025-05-22 NOTE — TELEPHONE ENCOUNTER
PCP: Richi Sibley MD    Last appt: 2/18/2025       No future appointments.    Requested Prescriptions     Pending Prescriptions Disp Refills    irbesartan-hydroCHLOROthiazide (AVALIDE) 150-12.5 MG per tablet [Pharmacy Med Name: IRBESARTAN-HCTZ 150-12.5 MG TB] 90 tablet 1     Sig: TAKE 1 TABLET BY MOUTH EVERY DAY       Prior labs and Blood pressures:  BP Readings from Last 3 Encounters:   02/18/25 126/85   05/21/24 108/67   11/21/23 138/60     Lab Results   Component Value Date/Time     03/17/2025 02:27 PM    K 4.0 03/17/2025 02:27 PM     03/17/2025 02:27 PM    CO2 28 03/17/2025 02:27 PM    BUN 19 03/17/2025 02:27 PM    GFRAA >60 05/02/2022 12:12 PM     Lab Results   Component Value Date/Time    OPR5IDPL 5.9 02/18/2025 12:04 PM     Lab Results   Component Value Date/Time    CHOL 183 02/18/2025 12:21 PM    HDL 77 02/18/2025 12:21 PM    LDL 96.6 02/18/2025 12:21 PM    LDL 89 11/21/2023 11:08 AM    VLDL 9.4 02/18/2025 12:21 PM    VLDL 12 09/10/2020 12:00 AM     No results found for: \"VITD3\"    Lab Results   Component Value Date/Time    TSH 0.50 05/21/2024 10:47 AM

## 2025-08-08 ENCOUNTER — PATIENT MESSAGE (OUTPATIENT)
Age: 65
End: 2025-08-08

## 2025-08-14 ENCOUNTER — TELEPHONE (OUTPATIENT)
Age: 65
End: 2025-08-14